# Patient Record
Sex: FEMALE | Race: OTHER | Employment: OTHER | URBAN - METROPOLITAN AREA
[De-identification: names, ages, dates, MRNs, and addresses within clinical notes are randomized per-mention and may not be internally consistent; named-entity substitution may affect disease eponyms.]

---

## 2017-02-14 ENCOUNTER — OFFICE VISIT (OUTPATIENT)
Dept: ENDOCRINOLOGY | Age: 72
End: 2017-02-14

## 2017-02-14 VITALS
OXYGEN SATURATION: 93 % | SYSTOLIC BLOOD PRESSURE: 138 MMHG | DIASTOLIC BLOOD PRESSURE: 86 MMHG | WEIGHT: 159.6 LBS | HEART RATE: 99 BPM

## 2017-02-14 DIAGNOSIS — E03.8 HYPOTHYROIDISM DUE TO HASHIMOTO'S THYROIDITIS: ICD-10-CM

## 2017-02-14 DIAGNOSIS — E06.3 HYPOTHYROIDISM DUE TO HASHIMOTO'S THYROIDITIS: ICD-10-CM

## 2017-02-14 DIAGNOSIS — E78.2 MIXED HYPERLIPIDEMIA: Primary | ICD-10-CM

## 2017-02-14 DIAGNOSIS — R73.03 PREDIABETES: ICD-10-CM

## 2017-02-14 DIAGNOSIS — F41.9 ANXIETY: ICD-10-CM

## 2017-02-14 DIAGNOSIS — L71.9 ACNE ROSACEA: ICD-10-CM

## 2017-02-14 PROCEDURE — 99204 OFFICE O/P NEW MOD 45 MIN: CPT | Performed by: INTERNAL MEDICINE

## 2017-02-14 RX ORDER — LEVOTHYROXINE SODIUM 0.03 MG/1
25 TABLET ORAL DAILY
Qty: 30 TABLET | Refills: 3 | Status: SHIPPED | OUTPATIENT
Start: 2017-02-14 | End: 2017-02-16 | Stop reason: SDUPTHER

## 2017-02-14 ASSESSMENT — PATIENT HEALTH QUESTIONNAIRE - PHQ9
SUM OF ALL RESPONSES TO PHQ9 QUESTIONS 1 & 2: 0
SUM OF ALL RESPONSES TO PHQ QUESTIONS 1-9: 0
1. LITTLE INTEREST OR PLEASURE IN DOING THINGS: 0
2. FEELING DOWN, DEPRESSED OR HOPELESS: 0

## 2017-02-16 ENCOUNTER — TELEPHONE (OUTPATIENT)
Dept: ENDOCRINOLOGY | Age: 72
End: 2017-02-16

## 2017-02-16 RX ORDER — LEVOTHYROXINE SODIUM 0.03 MG/1
25 TABLET ORAL DAILY
Qty: 30 TABLET | Refills: 3 | Status: SHIPPED | OUTPATIENT
Start: 2017-02-16 | End: 2017-10-05

## 2017-03-21 ENCOUNTER — TELEPHONE (OUTPATIENT)
Dept: ENDOCRINOLOGY | Age: 72
End: 2017-03-21

## 2017-08-21 ENCOUNTER — TELEPHONE (OUTPATIENT)
Dept: FAMILY MEDICINE CLINIC | Age: 72
End: 2017-08-21

## 2017-10-05 ENCOUNTER — OFFICE VISIT (OUTPATIENT)
Dept: FAMILY MEDICINE CLINIC | Age: 72
End: 2017-10-05

## 2017-10-05 DIAGNOSIS — Z13.220 SCREENING, LIPID: ICD-10-CM

## 2017-10-05 DIAGNOSIS — E03.9 HYPOTHYROIDISM, UNSPECIFIED TYPE: Primary | ICD-10-CM

## 2017-10-05 DIAGNOSIS — R73.03 PREDIABETES: ICD-10-CM

## 2017-10-05 DIAGNOSIS — Z76.89 ENCOUNTER TO ESTABLISH CARE: ICD-10-CM

## 2017-10-05 PROCEDURE — 99203 OFFICE O/P NEW LOW 30 MIN: CPT | Performed by: FAMILY MEDICINE

## 2017-10-05 ASSESSMENT — ENCOUNTER SYMPTOMS
VOMITING: 0
HEARTBURN: 0
ABDOMINAL PAIN: 0
COUGH: 0
WHEEZING: 0
DIARRHEA: 0
SORE THROAT: 0
BACK PAIN: 1
ORTHOPNEA: 0
NAUSEA: 0
SHORTNESS OF BREATH: 0
CONSTIPATION: 0

## 2017-10-05 NOTE — MR AVS SNAPSHOT
After Visit Summary             Dalton Santana   10/5/2017 10:00 AM   Office Visit    Description:  Female : 1945   Provider:  Elver Noonan MD   Department:  [facility] and Future Appointments         Below is a list of your follow-up and future appointments. This may not be a complete list as you may have made appointments directly with providers that we are not aware of or your providers may have made some for you. Please call your providers to confirm appointments. It is important to keep your appointments. Please bring your current insurance card, photo ID, co-pay, and all medication bottles to your appointment. If self-pay, payment is expected at the time of service. Your To-Do List     Future Orders Complete By Expires    CBC Auto Differential [NUG6463 Custom]  10/5/2017 10/5/2018    Comprehensive Metabolic Panel [NYH02 Custom]  10/5/2017 10/5/2018    Hemoglobin A1C [LAB90 Custom]  10/5/2017 10/5/2018    Lipid Panel [LAB18 Custom]  10/5/2017 10/5/2018    T4, Free [YCQ644 Custom]  10/5/2017 10/5/2018    TSH without Reflex [TWF730 Custom]  10/5/2017 10/5/2018    Urinalysis with Microscopic [VUG770 Custom]  10/5/2017 10/6/2018    Follow-Up    Return in about 2 weeks (around 10/19/2017) for to review blood work results .          Information from Your Visit        Department     Name Address Phone Fax    [address] [facility] [address] [phone]      You Were Seen for:         Comments    Encounter to establish care   [[id number]]         Vital Signs     Blood Pressure Pulse Weight Oxygen Saturation Smoking Status       150/80 (Site: Left Arm, Position: Sitting, Cuff Size: Medium Adult) 76 126 lb 9.6 oz (57.4 kg) 98% Never Smoker          Today's Medication Changes          These changes are accurate as of: 10/5/17 10:50 AM.  If you have any questions, ask your nurse or doctor. STOP taking these medications           levothyroxine 25 MCG tablet   Commonly known as:  LEVOTHROID   Stopped by:  Felisa Feng MD               Allergies           No Known Allergies         Additional Information        Basic Information     Date Of Birth Sex Race Ethnicity Preferred Language    1945 Female Other Non-/Non  English      Problem List as of 10/5/2017                 Hypothyroidism    Prediabetes    Anxiety      Preventive Care        Date Due    Tetanus Combination Vaccine (1 - Tdap) 9/30/1962    Cholesterol Screening 9/30/1983    Mammograms are recommended every 2 years for low/average risk patients aged 48 - 69, and every year for high risk patients per updated national guidelines. However these guidelines can be individualized by your provider. 9/30/1993    Colonoscopy 9/30/1993    Zoster Vaccine 9/30/2003    Osteoporosis screening or a bone density scan (Dexa) is recommended once at age 72. Based upon the results and risk factors for bone loss, your provider will recommend whether this needs to be repeated. 9/30/2008    Pneumococcal Vaccines (two) for all adults aged 72 and over (1 of 2 - PCV13) 9/30/2008    Yearly Flu Vaccine (1) 9/1/2017            MyChart Signup           [facility] allows you to send messages to your doctor, view your test results, renew your prescriptions, schedule appointments, view visit notes, and more. How Do I Sign Up? 1. In your Internet browser, go to [email or web address]  2. Click on the Sign Up Now link in the Sign In box. You will see the New Member Sign Up page. 3. Enter your [facility] Access Code exactly as it appears below. You will not need to use this code after youve completed the sign-up process. If you do not sign up before the expiration date, you must request a new code.   [facility] Access Code: [email or web address]  Expires: 12/4/2017 10:47 AM 4. Enter your Social Security Number ([id number]) and Date of Birth (mm/dd/yyyy) as indicated and click Submit. You will be taken to the next sign-up page. 5. Create a [employer] ID. This will be your [employer] login ID and cannot be changed, so think of one that is secure and easy to remember. 6. Create a [employer] password. You can change your password at any time. 7. Enter your Password Reset Question and Answer. This can be used at a later time if you forget your password. 8. Enter your e-mail address. You will receive e-mail notification when new information is available in [address] 9. Click Sign Up. You can now view your medical record. Additional Information  If you have questions, please contact the physician practice where you receive care. Remember, [employer] is NOT to be used for urgent needs. For medical emergencies, dial 911. For questions regarding your [employer] account call [phone]. If you have a clinical question, please call your doctor's office.

## 2017-10-13 ENCOUNTER — TELEPHONE (OUTPATIENT)
Dept: FAMILY MEDICINE CLINIC | Age: 72
End: 2017-10-13

## 2017-10-18 VITALS
DIASTOLIC BLOOD PRESSURE: 80 MMHG | SYSTOLIC BLOOD PRESSURE: 150 MMHG | HEART RATE: 76 BPM | WEIGHT: 148 LBS | OXYGEN SATURATION: 98 %

## 2017-10-18 DIAGNOSIS — R73.03 PREDIABETES: ICD-10-CM

## 2017-10-18 DIAGNOSIS — E03.9 HYPOTHYROIDISM, UNSPECIFIED TYPE: ICD-10-CM

## 2017-10-18 LAB
A/G RATIO: 1.4 (ref 1.1–2.2)
ALBUMIN SERPL-MCNC: 4.6 G/DL (ref 3.4–5)
ALP BLD-CCNC: 92 U/L (ref 40–129)
ALT SERPL-CCNC: 28 U/L (ref 10–40)
ANION GAP SERPL CALCULATED.3IONS-SCNC: 17 MMOL/L (ref 3–16)
AST SERPL-CCNC: 26 U/L (ref 15–37)
ATYPICAL LYMPHOCYTE RELATIVE PERCENT: 9 % (ref 0–6)
BASOPHILS ABSOLUTE: 0.2 K/UL (ref 0–0.2)
BASOPHILS RELATIVE PERCENT: 2 %
BILIRUB SERPL-MCNC: 0.8 MG/DL (ref 0–1)
BILIRUBIN URINE: NEGATIVE
BLOOD, URINE: NEGATIVE
BUN BLDV-MCNC: 10 MG/DL (ref 7–20)
CALCIUM SERPL-MCNC: 8.9 MG/DL (ref 8.3–10.6)
CHLORIDE BLD-SCNC: 106 MMOL/L (ref 99–110)
CHOLESTEROL, TOTAL: 187 MG/DL (ref 0–199)
CLARITY: CLEAR
CO2: 22 MMOL/L (ref 21–32)
COLOR: YELLOW
CREAT SERPL-MCNC: <0.5 MG/DL (ref 0.6–1.2)
EOSINOPHILS ABSOLUTE: 0.2 K/UL (ref 0–0.6)
EOSINOPHILS RELATIVE PERCENT: 2 %
EPITHELIAL CELLS, UA: 2 /HPF (ref 0–5)
ESTIMATED AVERAGE GLUCOSE: 122.6 MG/DL
GFR AFRICAN AMERICAN: >60
GFR NON-AFRICAN AMERICAN: >60
GLOBULIN: 3.2 G/DL
GLUCOSE BLD-MCNC: 133 MG/DL (ref 70–99)
GLUCOSE URINE: NEGATIVE MG/DL
HBA1C MFR BLD: 5.9 %
HCT VFR BLD CALC: 46.1 % (ref 36–48)
HDLC SERPL-MCNC: 41 MG/DL (ref 40–60)
HEMOGLOBIN: 15.5 G/DL (ref 12–16)
HYALINE CASTS: 0 /LPF (ref 0–8)
KETONES, URINE: NEGATIVE MG/DL
LDL CHOLESTEROL CALCULATED: 111 MG/DL
LEUKOCYTE ESTERASE, URINE: NEGATIVE
LYMPHOCYTES ABSOLUTE: 4 K/UL (ref 1–5.1)
LYMPHOCYTES RELATIVE PERCENT: 44 %
MCH RBC QN AUTO: 32.6 PG (ref 26–34)
MCHC RBC AUTO-ENTMCNC: 33.7 G/DL (ref 31–36)
MCV RBC AUTO: 96.7 FL (ref 80–100)
MICROSCOPIC EXAMINATION: YES
MONOCYTES ABSOLUTE: 0.2 K/UL (ref 0–1.3)
MONOCYTES RELATIVE PERCENT: 2 %
NEUTROPHILS ABSOLUTE: 3.1 K/UL (ref 1.7–7.7)
NEUTROPHILS RELATIVE PERCENT: 41 %
NITRITE, URINE: NEGATIVE
PDW BLD-RTO: 13.9 % (ref 12.4–15.4)
PH UA: 6
PLATELET # BLD: 190 K/UL (ref 135–450)
PMV BLD AUTO: 9.8 FL (ref 5–10.5)
POTASSIUM SERPL-SCNC: 3.8 MMOL/L (ref 3.5–5.1)
PROTEIN UA: 30 MG/DL
RBC # BLD: 4.77 M/UL (ref 4–5.2)
RBC # BLD: NORMAL 10*6/UL
RBC UA: 1 /HPF (ref 0–4)
SODIUM BLD-SCNC: 145 MMOL/L (ref 136–145)
SPECIFIC GRAVITY UA: 1.01
T4 FREE: 0.9 NG/DL (ref 0.9–1.8)
TOTAL PROTEIN: 7.8 G/DL (ref 6.4–8.2)
TRIGL SERPL-MCNC: 177 MG/DL (ref 0–150)
TSH SERPL DL<=0.05 MIU/L-ACNC: 9.06 UIU/ML (ref 0.27–4.2)
UROBILINOGEN, URINE: 0.2 E.U./DL
VLDLC SERPL CALC-MCNC: 35 MG/DL
WBC # BLD: 7.5 K/UL (ref 4–11)
WBC UA: 2 /HPF (ref 0–5)

## 2017-10-18 PROCEDURE — 81001 URINALYSIS AUTO W/SCOPE: CPT | Performed by: FAMILY MEDICINE

## 2017-10-24 ENCOUNTER — TELEPHONE (OUTPATIENT)
Dept: FAMILY MEDICINE CLINIC | Age: 72
End: 2017-10-24

## 2017-11-13 ENCOUNTER — OFFICE VISIT (OUTPATIENT)
Dept: FAMILY MEDICINE CLINIC | Age: 72
End: 2017-11-13

## 2017-11-13 VITALS — WEIGHT: 155 LBS | DIASTOLIC BLOOD PRESSURE: 80 MMHG | SYSTOLIC BLOOD PRESSURE: 150 MMHG

## 2017-11-13 DIAGNOSIS — I10 ESSENTIAL HYPERTENSION: ICD-10-CM

## 2017-11-13 DIAGNOSIS — E03.9 ACQUIRED HYPOTHYROIDISM: Primary | ICD-10-CM

## 2017-11-13 DIAGNOSIS — Z78.0 POSTMENOPAUSAL: ICD-10-CM

## 2017-11-13 DIAGNOSIS — R73.03 PREDIABETES: ICD-10-CM

## 2017-11-13 PROCEDURE — 99213 OFFICE O/P EST LOW 20 MIN: CPT | Performed by: FAMILY MEDICINE

## 2017-11-13 ASSESSMENT — ENCOUNTER SYMPTOMS: RESPIRATORY NEGATIVE: 1

## 2017-11-13 NOTE — PROGRESS NOTES
History   Smoking Status    Never Smoker   Smokeless Tobacco    Never Used     PMH, surgeries, SH, FH, allergies reviewed. Medication list reviewed and updated. No LMP recorded. Patient has had a hysterectomy. Chief Complaint   Patient presents with    Results     here to discuss lab results      Review of Systems   Constitutional: Negative. Respiratory: Negative. Cardiovascular: Negative. Musculoskeletal: Negative for falls. Objective:   VS reviewed    Vitals:    11/13/17 1356 11/13/17 1412   BP: (!) 150/82 (!) 150/80   Site: Right Arm    Position: Sitting    Cuff Size: Medium Adult    Weight: 155 lb (70.3 kg)      There is no height or weight on file to calculate BMI. Wt Readings from Last 3 Encounters:   11/13/17 155 lb (70.3 kg)   10/05/17 148 lb (67.1 kg)   02/14/17 159 lb 9.6 oz (72.4 kg)     BP Readings from Last 3 Encounters:   11/13/17 (!) 150/80   10/05/17 (!) 150/80   02/14/17 138/86       Physical Exam   Constitutional: She is oriented to person, place, and time. No distress. HENT:   Mouth/Throat: Oropharynx is clear and moist. No oropharyngeal exudate. Eyes: Pupils are equal, round, and reactive to light. Neck: Neck supple. No JVD present. No thyromegaly present. Cardiovascular: Normal rate, regular rhythm, normal heart sounds and intact distal pulses. No murmur heard. Pulmonary/Chest: Effort normal and breath sounds normal. No respiratory distress. Musculoskeletal: She exhibits no edema. Neurological: She is alert and oriented to person, place, and time.    Psychiatric: Mood, memory, affect and judgment normal.       Lab Results   Component Value Date     10/18/2017    K 3.8 10/18/2017     10/18/2017    CO2 22 10/18/2017    BUN 10 10/18/2017    CREATININE <0.5 (L) 10/18/2017    GLUCOSE 133 (H) 10/18/2017    CALCIUM 8.9 10/18/2017    PROT 7.8 10/18/2017    LABALBU 4.6 10/18/2017    BILITOT 0.8 10/18/2017    ALKPHOS 92 10/18/2017    AST 26 10/18/2017    ALT 28 10/18/2017    LABGLOM >60 10/18/2017    GFRAA >60 10/18/2017    AGRATIO 1.4 10/18/2017    GLOB 3.2 10/18/2017       Lab Results   Component Value Date    WBC 7.5 10/18/2017    HGB 15.5 10/18/2017    HCT 46.1 10/18/2017    MCV 96.7 10/18/2017     10/18/2017       Lab Results   Component Value Date    CHOL 187 10/18/2017     Lab Results   Component Value Date    TRIG 177 (H) 10/18/2017     Lab Results   Component Value Date    HDL 41 10/18/2017     Lab Results   Component Value Date    LDLCALC 111 (H) 10/18/2017     Lab Results   Component Value Date    LABVLDL 35 10/18/2017     No results found for: [facility]    Lab Results   Component Value Date    LABMICR YES 10/18/2017       Lab Results   Component Value Date    LABA1C 5.9 10/18/2017     Lab Results   Component Value Date    .6 10/18/2017     Lab Results   Component Value Date    TSH 9.06 (H) 10/18/2017   Free T4: 0.9     Assessment/Plan:    1. Acquired hypothyroidism  2. Essential hypertension  3. Prediabetes   Patient reports she had severe pain for months while she was taking levothyroxine some time ago. Discussed with patient this is not a common or rare side effect and might have been 2ry to something else. Declined any medication treatment. Declined any anti- hypertensive medication, BP is acceptable for a mobile senior but not ideal, this was discussed with patient. Low carb and low sodium diet discussed. Return in about 4 months (around 3/13/2018) for HTN . Amanda received counseling on the following healthy behaviors: nutrition and exercise    Patient given educational materials on Nutrition, low sodium diet, prediabetes and hypothyroidism. .     I have instructed Sarah Barajas to complete a self tracking handout on Blood Pressures  and instructed them to bring it with them to her next appointment. Discussed use, benefit, and side effects of prescribed medications. Barriers to medication compliance addressed.   All patient questions answered. Pt voiced understanding.

## 2017-11-13 NOTE — PATIENT INSTRUCTIONS
click on the \"Sign Up Now\" link. Current as of: July 28, 2016  Content Version: 11.3  © 2006-2017 [employer]. Care instructions adapted under license by [facility] ([facility]). If you have questions about a medical condition or this instruction, always ask your healthcare professional. [address] any warranty or liability for your use of this information. Patient Education        Prediabetes: Care Instructions  Your Care Instructions  Prediabetes is a warning sign that you are at risk for getting type 2 diabetes. It means that your blood sugar is higher than it should be. The food you eat turns into sugar, which your body uses for energy. Normally, an organ called the pancreas makes insulin, which allows the sugar in your blood to get into your body's cells. But when your body can't use insulin the right way, the sugar doesn't move into cells. It stays in your blood instead. This is called insulin resistance. The buildup of sugar in the blood causes prediabetes. The good news is that lifestyle changes may help you get your blood sugar back to normal and help you avoid or delay diabetes. Follow-up care is a key part of your treatment and safety. Be sure to make and go to all appointments, and call your doctor if you are having problems. It's also a good idea to know your test results and keep a list of the medicines you take. How can you care for yourself at home? · Watch your weight. A healthy weight helps your body use insulin properly. · Limit the amount of calories, sweets, and unhealthy fat you eat. Ask your doctor if you should see a dietitian. A registered dietitian can help you create meal plans that fit your lifestyle. · Get at least 30 minutes of exercise on most days of the week. Exercise helps control your blood sugar. It also helps you maintain a healthy weight. Walking is a good choice.  You also may want to do other activities, such as running, swimming, cycling,

## 2017-11-15 NOTE — PROGRESS NOTES
Arnulfo Morrison
I called [facility] on [address] and spoke with [place]. She stats that Mrs. Marr only got 2 flu shots. 10/12/2017 and 10/10/2017. Will add these to her immunization record. DC
She exhibits no distension. There is no tenderness. Musculoskeletal: She exhibits no edema or tenderness. Lymphadenopathy:     She has no cervical adenopathy. Neurological: She is alert and oriented to person, place, and time. Skin: No rash noted. No erythema. No pallor. Psychiatric: Mood, memory, affect and judgment normal.   Tangentiality. Get up and go test:     Well coordinated movements:No  Adjusted movements: Yes  Supervision needed: yes  Physical support needed: yes      ASSESSMENT / PLAN    Encounter to establish care   Hypothyroidism, unspecified type   Prediabetes  - CBC Auto Differential; Future  - Comprehensive Metabolic Panel; Future  - T4, Free; Future  - Lipid Panel; Future  - Hemoglobin A1C; Future  - Urinalysis with Microscopic; Future    4. Screening, lipid    Return in about 2 weeks (around 10/19/2017) for to review blood work results . Before if needed.       Orders Placed This Encounter   Procedures    CBC Auto Differential    Comprehensive Metabolic Panel    TSH without Reflex    T4, Free    Lipid Panel    Hemoglobin A1C    Urinalysis with Microscopic

## 2017-12-13 ENCOUNTER — HOSPITAL ENCOUNTER (OUTPATIENT)
Dept: GENERAL RADIOLOGY | Age: 72
Discharge: HOME | End: 2017-12-13
Attending: FAMILY MEDICINE | Admitting: FAMILY MEDICINE

## 2017-12-13 DIAGNOSIS — Z78.0 ASYMPTOMATIC MENOPAUSAL STATE: ICD-10-CM

## 2017-12-13 DIAGNOSIS — Z78.0 POSTMENOPAUSAL: ICD-10-CM

## 2018-04-17 ENCOUNTER — TELEPHONE (OUTPATIENT)
Dept: FAMILY MEDICINE CLINIC | Age: 73
End: 2018-04-17

## 2018-04-20 ENCOUNTER — OFFICE VISIT (OUTPATIENT)
Dept: FAMILY MEDICINE CLINIC | Age: 73
End: 2018-04-20

## 2018-04-20 VITALS
DIASTOLIC BLOOD PRESSURE: 90 MMHG | OXYGEN SATURATION: 98 % | HEART RATE: 62 BPM | SYSTOLIC BLOOD PRESSURE: 140 MMHG | WEIGHT: 157.2 LBS

## 2018-04-20 DIAGNOSIS — E03.9 ACQUIRED HYPOTHYROIDISM: ICD-10-CM

## 2018-04-20 DIAGNOSIS — R26.81 UNSTEADY GAIT: Primary | ICD-10-CM

## 2018-04-20 DIAGNOSIS — I10 ESSENTIAL HYPERTENSION: ICD-10-CM

## 2018-04-20 DIAGNOSIS — R29.6 FREQUENT FALLS: ICD-10-CM

## 2018-04-20 PROCEDURE — 99213 OFFICE O/P EST LOW 20 MIN: CPT | Performed by: FAMILY MEDICINE

## 2018-04-20 ASSESSMENT — PATIENT HEALTH QUESTIONNAIRE - PHQ9
SUM OF ALL RESPONSES TO PHQ9 QUESTIONS 1 & 2: 0
1. LITTLE INTEREST OR PLEASURE IN DOING THINGS: 0
SUM OF ALL RESPONSES TO PHQ QUESTIONS 1-9: 0
2. FEELING DOWN, DEPRESSED OR HOPELESS: 0

## 2018-04-20 ASSESSMENT — ENCOUNTER SYMPTOMS
RESPIRATORY NEGATIVE: 1
GASTROINTESTINAL NEGATIVE: 1
DOUBLE VISION: 0
BLURRED VISION: 0

## 2018-04-24 ENCOUNTER — TELEPHONE (OUTPATIENT)
Dept: FAMILY MEDICINE CLINIC | Age: 73
End: 2018-04-24

## 2018-06-19 ENCOUNTER — TELEPHONE (OUTPATIENT)
Dept: FAMILY MEDICINE CLINIC | Age: 73
End: 2018-06-19

## 2018-07-09 ENCOUNTER — OFFICE VISIT (OUTPATIENT)
Dept: FAMILY MEDICINE CLINIC | Age: 73
End: 2018-07-09

## 2018-07-09 VITALS
WEIGHT: 155.4 LBS | OXYGEN SATURATION: 97 % | HEART RATE: 89 BPM | DIASTOLIC BLOOD PRESSURE: 80 MMHG | SYSTOLIC BLOOD PRESSURE: 130 MMHG

## 2018-07-09 DIAGNOSIS — I10 ESSENTIAL HYPERTENSION: Primary | ICD-10-CM

## 2018-07-09 DIAGNOSIS — R26.81 UNSTEADY GAIT: ICD-10-CM

## 2018-07-09 PROCEDURE — 99213 OFFICE O/P EST LOW 20 MIN: CPT | Performed by: FAMILY MEDICINE

## 2018-07-09 NOTE — PROGRESS NOTES
History   Smoking Status    Never Smoker   Smokeless Tobacco    Never Used     PMH, surgeries, SH, FH, allergies reviewed. Medication list reviewed and updated. No LMP recorded. Patient has had a hysterectomy. Chief Complaint   Patient presents with    Hypertension     House call nurse visited patient told her BP was elevated 160's/? Denies chest pain, SOB, cough, visual changes, dizziness, palpitations or headache. Review of Systems   Musculoskeletal: Negative for falls. Psychiatric/Behavioral: Negative for depression. Objective:   VS reviewed    Vitals:    07/09/18 1327   BP: 130/80   Site: Left Arm   Position: Sitting   Cuff Size: Medium Adult   Pulse: 89   SpO2: 97%   Weight: 155 lb 6.4 oz (70.5 kg)     There is no height or weight on file to calculate BMI. Wt Readings from Last 3 Encounters:   07/09/18 155 lb 6.4 oz (70.5 kg)   04/20/18 157 lb 3.2 oz (71.3 kg)   11/13/17 155 lb (70.3 kg)     BP Readings from Last 3 Encounters:   07/09/18 130/80   04/20/18 (!) 140/90   11/13/17 (!) 150/80       Physical Exam   Constitutional: She is oriented to person, place, and time. No distress. Neck: No JVD present. No thyromegaly present. Cardiovascular: Normal rate, regular rhythm, normal heart sounds and intact distal pulses. No murmur heard. Pulmonary/Chest: Effort normal and breath sounds normal. No respiratory distress. Musculoskeletal: She exhibits no edema. Neurological: She is alert and oriented to person, place, and time.    Psychiatric: Mood, memory, affect and judgment normal.     Get up and go test:     Well coordinated movements:No  Adjusted movements: Yes  Supervision needed: no  Physical support needed: yes  Lab Results   Component Value Date     10/18/2017    K 3.8 10/18/2017     10/18/2017    CO2 22 10/18/2017    BUN 10 10/18/2017    CREATININE <0.5 (L) 10/18/2017    GLUCOSE 133 (H) 10/18/2017    CALCIUM 8.9 10/18/2017    PROT 7.8 10/18/2017    LABALBU 4.6

## 2019-05-08 ENCOUNTER — OFFICE VISIT (OUTPATIENT)
Dept: FAMILY MEDICINE CLINIC | Age: 74
End: 2019-05-08
Payer: MEDICARE

## 2019-05-08 VITALS
HEIGHT: 66 IN | DIASTOLIC BLOOD PRESSURE: 80 MMHG | HEART RATE: 89 BPM | OXYGEN SATURATION: 99 % | SYSTOLIC BLOOD PRESSURE: 130 MMHG | BODY MASS INDEX: 24.23 KG/M2 | WEIGHT: 150.8 LBS

## 2019-05-08 DIAGNOSIS — R73.03 PREDIABETES: ICD-10-CM

## 2019-05-08 DIAGNOSIS — Z00.00 ROUTINE GENERAL MEDICAL EXAMINATION AT A HEALTH CARE FACILITY: Primary | ICD-10-CM

## 2019-05-08 DIAGNOSIS — F41.9 ANXIETY: ICD-10-CM

## 2019-05-08 DIAGNOSIS — E03.9 ACQUIRED HYPOTHYROIDISM: ICD-10-CM

## 2019-05-08 PROCEDURE — 90732 PPSV23 VACC 2 YRS+ SUBQ/IM: CPT | Performed by: FAMILY MEDICINE

## 2019-05-08 PROCEDURE — G0438 PPPS, INITIAL VISIT: HCPCS | Performed by: FAMILY MEDICINE

## 2019-05-08 PROCEDURE — 90471 IMMUNIZATION ADMIN: CPT | Performed by: FAMILY MEDICINE

## 2019-05-08 PROCEDURE — 90715 TDAP VACCINE 7 YRS/> IM: CPT | Performed by: FAMILY MEDICINE

## 2019-05-08 PROCEDURE — G0009 ADMIN PNEUMOCOCCAL VACCINE: HCPCS | Performed by: FAMILY MEDICINE

## 2019-05-08 ASSESSMENT — PATIENT HEALTH QUESTIONNAIRE - PHQ9
SUM OF ALL RESPONSES TO PHQ QUESTIONS 1-9: 0
SUM OF ALL RESPONSES TO PHQ QUESTIONS 1-9: 0

## 2019-05-08 ASSESSMENT — ANXIETY QUESTIONNAIRES: GAD7 TOTAL SCORE: 0

## 2019-05-08 ASSESSMENT — LIFESTYLE VARIABLES: HOW OFTEN DO YOU HAVE A DRINK CONTAINING ALCOHOL: 0

## 2019-05-08 NOTE — PATIENT INSTRUCTIONS
Personalized Preventive Plan for Betito Hoover - 5/8/2019  Medicare offers a range of preventive health benefits. Some of the tests and screenings are paid in full while other may be subject to a deductible, co-insurance, and/or copay. Some of these benefits include a comprehensive review of your medical history including lifestyle, illnesses that may run in your family, and various assessments and screenings as appropriate. After reviewing your medical record and screening and assessments performed today your provider may have ordered immunizations, labs, imaging, and/or referrals for you. A list of these orders (if applicable) as well as your Preventive Care list are included within your After Visit Summary for your review. Other Preventive Recommendations:    · A preventive eye exam performed by an eye specialist is recommended every 1-2 years to screen for glaucoma; cataracts, macular degeneration, and other eye disorders. · A preventive dental visit is recommended every 6 months. · Try to get at least 150 minutes of exercise per week or 10,000 steps per day on a pedometer . · Order or download the FREE \"Exercise & Physical Activity: Your Everyday Guide\" from [employer]. Call 0-847.291.7396 or search [employer] online. · You need 1601-4420 mg of calcium and 9340-8829 IU of vitamin D per day. It is possible to meet your calcium requirement with diet alone, but a vitamin D supplement is usually necessary to meet this goal.  · When exposed to the sun, use a sunscreen that protects against both UVA and UVB radiation with an SPF of 30 or greater. Reapply every 2 to 3 hours or after sweating, drying off with a towel, or swimming. · Always wear a seat belt when traveling in a car. Always wear a helmet when riding a bicycle or motorcycle.

## 2019-05-08 NOTE — PROGRESS NOTES
Medicare Annual Wellness Visit  Name: Maxim Jama Date: 2019   MRN: [id number] Sex: Female   Age: 74 y.o. Ethnicity: Non-/Non    : 1945 Race: Other      Elizabet Pruett is here for Medicare AWV    Screenings for behavioral, psychosocial and functional/safety risks, and cognitive dysfunction are all negative except as indicated below. These results, as well as other patient data from the [address] form, are documented in Flowsheets linked to this Encounter. No Known Allergiesic  Prior to Visit Medications    Not on File   ations    Not on File      Diagnosis Date    Anxiety     Hypothyroidism     Irritable bowel syndrome     Prediabetes      Past Surgical History:   Procedure Laterality Date    HYSTERECTOMY         Family History   Problem Relation Age of Onset    Thyroid Disease Sister     No Known Problems Sister     No Known Problems Son     No Known Problems Son     No Known Problems Daughter        CareTeam (Including outside providers/suppliers regularly involved in providing care):   Patient Care Team:  Ale Chandra MD as PCP - General (Family Medicine)  Ale Chandra MD as PCP - S Attributed Provider    Wt Readings from Last 3 Encounters:   19 150 lb 12.8 oz (68.4 kg)   18 155 lb 6.4 oz (70.5 kg)   18 157 lb 3.2 oz (71.3 kg)     Vitals:    19 1358   BP: 130/80   Site: Left Upper Arm   Position: Sitting   Cuff Size: Medium Adult   Pulse: 89   SpO2: 99%   Weight: 150 lb 12.8 oz (68.4 kg)   Height: 5' 6\" (1.676 m)     Body mass index is 24.34 kg/m². Based upon direct observation of the patient, evaluation of cognition reveals recent and remote memory intact. Physical Exam   Constitutional: She is oriented to person, place, and time. No distress. HENT:   Mouth/Throat: No oropharyngeal exudate. Eyes: Pupils are equal, round, and reactive to light.    Cardiovascular: Normal rate, regular rhythm, normal heart sounds and intact distal pulses. No murmur heard. Pulmonary/Chest: Effort normal and breath sounds normal. No respiratory distress. Musculoskeletal: She exhibits no edema. Neurological: She is alert and oriented to person, place, and time. Skin: Capillary refill takes less than 2 seconds. No rash noted. No erythema. No pallor. Psychiatric: She has a normal mood and affect. Her behavior is normal. Judgment and thought content normal.     Patient's complete Health Risk Assessment and screening values have been reviewed and are found in Flowsheets. The following problems were reviewed today and where indicated follow up appointments were made and/or referrals ordered. Positive Risk Factor Screenings with Interventions:     Fall Risk:  Timed Up and Go Test > 12 seconds?: (!) yes  2 or more falls in past year?: (!) yes  Fall with injury in past year?: (!) yes  Fall Risk Assessment[de-identified] (!) Positive Screening for Falls  Fall Risk Interventions:    · Patient declines any further evaluation/treatment for this issue    General Health:  General  In general, how would you say your health is?: Good  Do you get the social and emotional support that you need?: Yes  Do you have a Living Will?: (!) No  General Health Risk Interventions:  · No Living Will: provided the state-specific advance directive document to the patient    Health Habits/Nutrition:  Health Habits/Nutrition  Do you exercise for at least 20 minutes 2-3 times per week?: Yes  Have you lost any weight without trying in the past 3 months?: (!) Yes  Do you eat fewer than 2 meals per day?: (!) Yes  Have you seen a dentist within the past year?: Yes  Body mass index is 24.34 kg/m².   Health Habits/Nutrition Interventions:  · Inadequate physical activity:  patient is not ready to increase his/her physical activity level at this time  · Nutritional issues:  Patient declined further referral  · Dental exam overdue:  patient encouraged to make appointment with his/her dentist    Hearing/Vision:  Hearing/Vision  Do you or your family notice any trouble with your hearing?: No  Do you have difficulty driving, watching TV, or doing any of your daily activities because of your eyesight?: (!) Yes  Have you had an eye exam within the past year?: (!) No  Hearing/Vision Interventions:  · Vision concerns:  patient encouraged to make appointment with his/her eye specialist    Personalized Preventive Plan   Current Health Maintenance Status  Immunization History   Administered Date(s) Administered    Influenza, High Dose (Fluzone 65 yrs and older) 09/23/2009, 10/10/2016, 10/12/2017    Pneumococcal 13-valent Conjugate (Kellie Angeles) 09/30/2009        Health Maintenance   Topic Date Due    DTaP/Tdap/Td vaccine (1 - Tdap) 09/30/1962    Shingles Vaccine (1 of 2) 09/30/1993    Pneumococcal 65+ years Vaccine (2 of 2 - PPSV23) 09/30/2010    A1C test (Diabetic or Prediabetic)  10/18/2018    TSH testing  10/18/2018    Potassium monitoring  10/18/2018    Creatinine monitoring  10/18/2018    Flu vaccine (Season Ended) 09/01/2019    Colon cancer screen colonoscopy  10/16/2020    Lipid screen  10/18/2022    DEXA (modify frequency per FRAX score)  Completed     Recommendations for Preventive Services Due: see orders and patient instructions/AVS.  .   Recommended screening schedule for the next 5-10 years is provided to the patient in written form: see Patient Instructions/AVS.

## 2020-01-31 ENCOUNTER — OFFICE VISIT (OUTPATIENT)
Dept: PRIMARY CARE CLINIC | Age: 75
End: 2020-01-31
Payer: MEDICARE

## 2020-01-31 VITALS
SYSTOLIC BLOOD PRESSURE: 140 MMHG | TEMPERATURE: 98 F | OXYGEN SATURATION: 96 % | DIASTOLIC BLOOD PRESSURE: 88 MMHG | WEIGHT: 155 LBS | RESPIRATION RATE: 15 BRPM | BODY MASS INDEX: 25.02 KG/M2 | HEART RATE: 82 BPM

## 2020-01-31 DIAGNOSIS — R53.83 OTHER FATIGUE: ICD-10-CM

## 2020-01-31 DIAGNOSIS — R73.09 ELEVATED GLUCOSE: ICD-10-CM

## 2020-01-31 DIAGNOSIS — E03.9 ACQUIRED HYPOTHYROIDISM: ICD-10-CM

## 2020-01-31 DIAGNOSIS — I10 ESSENTIAL HYPERTENSION: ICD-10-CM

## 2020-01-31 LAB
A/G RATIO: 1.4 (ref 1.1–2.2)
ALBUMIN SERPL-MCNC: 4.6 G/DL (ref 3.4–5)
ALP BLD-CCNC: 110 U/L (ref 40–129)
ALT SERPL-CCNC: 33 U/L (ref 10–40)
ANION GAP SERPL CALCULATED.3IONS-SCNC: 17 MMOL/L (ref 3–16)
AST SERPL-CCNC: 38 U/L (ref 15–37)
BASOPHILS ABSOLUTE: 0 K/UL (ref 0–0.2)
BASOPHILS RELATIVE PERCENT: 0.5 %
BILIRUB SERPL-MCNC: 0.5 MG/DL (ref 0–1)
BUN BLDV-MCNC: 6 MG/DL (ref 7–20)
CALCIUM SERPL-MCNC: 9 MG/DL (ref 8.3–10.6)
CHLORIDE BLD-SCNC: 105 MMOL/L (ref 99–110)
CHOLESTEROL, TOTAL: 162 MG/DL (ref 0–199)
CO2: 21 MMOL/L (ref 21–32)
CREAT SERPL-MCNC: 0.5 MG/DL (ref 0.6–1.2)
CREATININE URINE: 133.9 MG/DL (ref 28–259)
EOSINOPHILS ABSOLUTE: 0 K/UL (ref 0–0.6)
EOSINOPHILS RELATIVE PERCENT: 0.6 %
GFR AFRICAN AMERICAN: >60
GFR NON-AFRICAN AMERICAN: >60
GLOBULIN: 3.3 G/DL
GLUCOSE BLD-MCNC: 192 MG/DL (ref 70–99)
HCT VFR BLD CALC: 42.9 % (ref 36–48)
HDLC SERPL-MCNC: 40 MG/DL (ref 40–60)
HEMOGLOBIN: 14.7 G/DL (ref 12–16)
LDL CHOLESTEROL CALCULATED: 81 MG/DL
LYMPHOCYTES ABSOLUTE: 2.1 K/UL (ref 1–5.1)
LYMPHOCYTES RELATIVE PERCENT: 34 %
MCH RBC QN AUTO: 33 PG (ref 26–34)
MCHC RBC AUTO-ENTMCNC: 34.4 G/DL (ref 31–36)
MCV RBC AUTO: 96 FL (ref 80–100)
MICROALBUMIN UR-MCNC: 10.1 MG/DL
MICROALBUMIN/CREAT UR-RTO: 75.4 MG/G (ref 0–30)
MONOCYTES ABSOLUTE: 0.4 K/UL (ref 0–1.3)
MONOCYTES RELATIVE PERCENT: 6.6 %
NEUTROPHILS ABSOLUTE: 3.7 K/UL (ref 1.7–7.7)
NEUTROPHILS RELATIVE PERCENT: 58.3 %
PDW BLD-RTO: 14 % (ref 12.4–15.4)
PLATELET # BLD: 167 K/UL (ref 135–450)
PMV BLD AUTO: 9.2 FL (ref 5–10.5)
POTASSIUM SERPL-SCNC: 3.5 MMOL/L (ref 3.5–5.1)
RBC # BLD: 4.47 M/UL (ref 4–5.2)
SODIUM BLD-SCNC: 143 MMOL/L (ref 136–145)
T4 FREE: 0.9 NG/DL (ref 0.9–1.8)
TOTAL PROTEIN: 7.9 G/DL (ref 6.4–8.2)
TRIGL SERPL-MCNC: 204 MG/DL (ref 0–150)
TSH SERPL DL<=0.05 MIU/L-ACNC: 4.73 UIU/ML (ref 0.27–4.2)
VITAMIN B-12: 225 PG/ML (ref 211–911)
VLDLC SERPL CALC-MCNC: 41 MG/DL
WBC # BLD: 6.3 K/UL (ref 4–11)

## 2020-01-31 PROCEDURE — 99214 OFFICE O/P EST MOD 30 MIN: CPT | Performed by: FAMILY MEDICINE

## 2020-01-31 ASSESSMENT — ENCOUNTER SYMPTOMS
EYES NEGATIVE: 1
GASTROINTESTINAL NEGATIVE: 1
RESPIRATORY NEGATIVE: 1
ALLERGIC/IMMUNOLOGIC NEGATIVE: 1

## 2020-02-01 LAB
ESTIMATED AVERAGE GLUCOSE: 125.5 MG/DL
HBA1C MFR BLD: 6 %

## 2020-02-10 ENCOUNTER — TELEPHONE (OUTPATIENT)
Dept: PRIMARY CARE CLINIC | Age: 75
End: 2020-02-10

## 2020-02-11 ENCOUNTER — TELEPHONE (OUTPATIENT)
Dept: PRIMARY CARE CLINIC | Age: 75
End: 2020-02-11

## 2020-10-05 ENCOUNTER — TELEPHONE (OUTPATIENT)
Dept: PRIMARY CARE CLINIC | Age: 75
End: 2020-10-05

## 2020-10-05 NOTE — TELEPHONE ENCOUNTER
Not sure whay the notes is trying to say but the patients caregiver called today to confirm appointment.

## 2020-10-19 ENCOUNTER — APPOINTMENT (OUTPATIENT)
Dept: CT IMAGING | Age: 75
DRG: 871 | End: 2020-10-19
Payer: MEDICARE

## 2020-10-19 ENCOUNTER — HOSPITAL ENCOUNTER (INPATIENT)
Age: 75
LOS: 5 days | Discharge: HOME HEALTH | DRG: 871 | End: 2020-10-24
Attending: EMERGENCY MEDICINE | Admitting: INTERNAL MEDICINE
Payer: MEDICARE

## 2020-10-19 ENCOUNTER — APPOINTMENT (OUTPATIENT)
Dept: GENERAL RADIOLOGY | Age: 75
DRG: 871 | End: 2020-10-19
Payer: MEDICARE

## 2020-10-19 PROBLEM — N39.0 UTI (URINARY TRACT INFECTION): Status: ACTIVE | Noted: 2020-10-19

## 2020-10-19 LAB
A/G RATIO: 1.2 (ref 1.1–2.2)
A/G RATIO: 1.3 (ref 1.1–2.2)
ABO/RH: NORMAL
ALBUMIN SERPL-MCNC: 3.8 G/DL (ref 3.4–5)
ALBUMIN SERPL-MCNC: 4.3 G/DL (ref 3.4–5)
ALP BLD-CCNC: 57 U/L (ref 40–129)
ALP BLD-CCNC: 59 U/L (ref 40–129)
ALT SERPL-CCNC: 21 U/L (ref 10–40)
ALT SERPL-CCNC: 22 U/L (ref 10–40)
ANION GAP SERPL CALCULATED.3IONS-SCNC: 15 MMOL/L (ref 3–16)
ANION GAP SERPL CALCULATED.3IONS-SCNC: 15 MMOL/L (ref 3–16)
ANTIBODY SCREEN: NORMAL
AST SERPL-CCNC: 28 U/L (ref 15–37)
AST SERPL-CCNC: 35 U/L (ref 15–37)
BACTERIA: ABNORMAL /HPF
BASE EXCESS VENOUS: -2.9 MMOL/L (ref -3–3)
BASOPHILS ABSOLUTE: 0 K/UL (ref 0–0.2)
BASOPHILS ABSOLUTE: 0 K/UL (ref 0–0.2)
BASOPHILS RELATIVE PERCENT: 0.4 %
BASOPHILS RELATIVE PERCENT: 0.5 %
BILIRUB SERPL-MCNC: 0.6 MG/DL (ref 0–1)
BILIRUB SERPL-MCNC: 0.8 MG/DL (ref 0–1)
BILIRUBIN URINE: NEGATIVE
BLOOD, URINE: NEGATIVE
BUN BLDV-MCNC: 12 MG/DL (ref 7–20)
BUN BLDV-MCNC: 14 MG/DL (ref 7–20)
CALCIUM SERPL-MCNC: 8.5 MG/DL (ref 8.3–10.6)
CALCIUM SERPL-MCNC: 9.3 MG/DL (ref 8.3–10.6)
CARBOXYHEMOGLOBIN: 2.2 % (ref 0–1.5)
CHLORIDE BLD-SCNC: 110 MMOL/L (ref 99–110)
CHLORIDE BLD-SCNC: 111 MMOL/L (ref 99–110)
CLARITY: ABNORMAL
CO2: 16 MMOL/L (ref 21–32)
CO2: 17 MMOL/L (ref 21–32)
COLOR: YELLOW
CREAT SERPL-MCNC: 0.6 MG/DL (ref 0.6–1.2)
CREAT SERPL-MCNC: 0.7 MG/DL (ref 0.6–1.2)
D DIMER: 254 NG/ML DDU (ref 0–229)
EKG ATRIAL RATE: 61 BPM
EKG DIAGNOSIS: NORMAL
EKG P AXIS: 23 DEGREES
EKG P-R INTERVAL: 182 MS
EKG Q-T INTERVAL: 432 MS
EKG QRS DURATION: 76 MS
EKG QTC CALCULATION (BAZETT): 434 MS
EKG R AXIS: 39 DEGREES
EKG T AXIS: 83 DEGREES
EKG VENTRICULAR RATE: 61 BPM
EOSINOPHILS ABSOLUTE: 0 K/UL (ref 0–0.6)
EOSINOPHILS ABSOLUTE: 0 K/UL (ref 0–0.6)
EOSINOPHILS RELATIVE PERCENT: 0.4 %
EOSINOPHILS RELATIVE PERCENT: 0.6 %
EPITHELIAL CELLS, UA: 13 /HPF (ref 0–5)
FIBRINOGEN: 294 MG/DL (ref 200–397)
GFR AFRICAN AMERICAN: >60
GFR AFRICAN AMERICAN: >60
GFR NON-AFRICAN AMERICAN: >60
GFR NON-AFRICAN AMERICAN: >60
GLOBULIN: 3.2 G/DL
GLOBULIN: 3.2 G/DL
GLUCOSE BLD-MCNC: 106 MG/DL (ref 70–99)
GLUCOSE BLD-MCNC: 146 MG/DL (ref 70–99)
GLUCOSE URINE: NEGATIVE MG/DL
HCO3 VENOUS: 21.4 MMOL/L (ref 23–29)
HCT VFR BLD CALC: 42.2 % (ref 36–48)
HCT VFR BLD CALC: 42.9 % (ref 36–48)
HEMOGLOBIN: 14.3 G/DL (ref 12–16)
HEMOGLOBIN: 14.7 G/DL (ref 12–16)
INR BLD: 0.96 (ref 0.86–1.14)
INR BLD: 0.97 (ref 0.86–1.14)
KETONES, URINE: NEGATIVE MG/DL
LACTATE DEHYDROGENASE: 243 U/L (ref 100–190)
LACTIC ACID, SEPSIS: 2.8 MMOL/L (ref 0.4–1.9)
LACTIC ACID, SEPSIS: 3.2 MMOL/L (ref 0.4–1.9)
LEUKOCYTE ESTERASE, URINE: ABNORMAL
LIPASE: 35 U/L (ref 13–60)
LYMPHOCYTES ABSOLUTE: 2.5 K/UL (ref 1–5.1)
LYMPHOCYTES ABSOLUTE: 2.7 K/UL (ref 1–5.1)
LYMPHOCYTES RELATIVE PERCENT: 33.9 %
LYMPHOCYTES RELATIVE PERCENT: 41.8 %
MCH RBC QN AUTO: 32.8 PG (ref 26–34)
MCH RBC QN AUTO: 33.5 PG (ref 26–34)
MCHC RBC AUTO-ENTMCNC: 33.9 G/DL (ref 31–36)
MCHC RBC AUTO-ENTMCNC: 34.2 G/DL (ref 31–36)
MCV RBC AUTO: 95.9 FL (ref 80–100)
MCV RBC AUTO: 98.7 FL (ref 80–100)
METHEMOGLOBIN VENOUS: 0.1 %
MICROSCOPIC EXAMINATION: YES
MONOCYTES ABSOLUTE: 0.4 K/UL (ref 0–1.3)
MONOCYTES ABSOLUTE: 0.6 K/UL (ref 0–1.3)
MONOCYTES RELATIVE PERCENT: 6.7 %
MONOCYTES RELATIVE PERCENT: 8.3 %
NEUTROPHILS ABSOLUTE: 3.3 K/UL (ref 1.7–7.7)
NEUTROPHILS ABSOLUTE: 4.2 K/UL (ref 1.7–7.7)
NEUTROPHILS RELATIVE PERCENT: 50.7 %
NEUTROPHILS RELATIVE PERCENT: 56.7 %
NITRITE, URINE: POSITIVE
O2 CONTENT, VEN: 23 VOL %
O2 SAT, VEN: 99 %
O2 THERAPY: ABNORMAL
PCO2, VEN: 35.7 MMHG (ref 40–50)
PDW BLD-RTO: 13.3 % (ref 12.4–15.4)
PDW BLD-RTO: 13.9 % (ref 12.4–15.4)
PH UA: 6 (ref 5–8)
PH VENOUS: 7.39 (ref 7.35–7.45)
PLATELET # BLD: 160 K/UL (ref 135–450)
PLATELET # BLD: 174 K/UL (ref 135–450)
PMV BLD AUTO: 8.6 FL (ref 5–10.5)
PMV BLD AUTO: 8.7 FL (ref 5–10.5)
PO2, VEN: 121 MMHG (ref 25–40)
POTASSIUM REFLEX MAGNESIUM: 3.8 MMOL/L (ref 3.5–5.1)
POTASSIUM REFLEX MAGNESIUM: 4.9 MMOL/L (ref 3.5–5.1)
PRO-BNP: 43 PG/ML (ref 0–449)
PROCALCITONIN: 0.08 NG/ML (ref 0–0.15)
PROTEIN UA: ABNORMAL MG/DL
PROTHROMBIN TIME: 11.1 SEC (ref 10–13.2)
PROTHROMBIN TIME: 11.3 SEC (ref 10–13.2)
RBC # BLD: 4.28 M/UL (ref 4–5.2)
RBC # BLD: 4.48 M/UL (ref 4–5.2)
RBC UA: 3 /HPF (ref 0–4)
REASON FOR REJECTION: NORMAL
REJECTED TEST: NORMAL
SODIUM BLD-SCNC: 142 MMOL/L (ref 136–145)
SODIUM BLD-SCNC: 142 MMOL/L (ref 136–145)
SPECIFIC GRAVITY UA: 1.02 (ref 1–1.03)
TCO2 CALC VENOUS: 51 MMOL/L
TOTAL PROTEIN: 7 G/DL (ref 6.4–8.2)
TOTAL PROTEIN: 7.5 G/DL (ref 6.4–8.2)
TROPONIN: <0.01 NG/ML
URINE REFLEX TO CULTURE: YES
URINE TYPE: ABNORMAL
UROBILINOGEN, URINE: 0.2 E.U./DL
WBC # BLD: 6.5 K/UL (ref 4–11)
WBC # BLD: 7.4 K/UL (ref 4–11)
WBC UA: 14 /HPF (ref 0–5)

## 2020-10-19 PROCEDURE — 84443 ASSAY THYROID STIM HORMONE: CPT

## 2020-10-19 PROCEDURE — 81001 URINALYSIS AUTO W/SCOPE: CPT

## 2020-10-19 PROCEDURE — 72125 CT NECK SPINE W/O DYE: CPT

## 2020-10-19 PROCEDURE — 87040 BLOOD CULTURE FOR BACTERIA: CPT

## 2020-10-19 PROCEDURE — 6360000002 HC RX W HCPCS: Performed by: PHYSICIAN ASSISTANT

## 2020-10-19 PROCEDURE — G0378 HOSPITAL OBSERVATION PER HR: HCPCS

## 2020-10-19 PROCEDURE — 2580000003 HC RX 258: Performed by: PHYSICIAN ASSISTANT

## 2020-10-19 PROCEDURE — 70450 CT HEAD/BRAIN W/O DYE: CPT

## 2020-10-19 PROCEDURE — 87150 DNA/RNA AMPLIFIED PROBE: CPT

## 2020-10-19 PROCEDURE — 86850 RBC ANTIBODY SCREEN: CPT

## 2020-10-19 PROCEDURE — 87186 SC STD MICRODIL/AGAR DIL: CPT

## 2020-10-19 PROCEDURE — U0002 COVID-19 LAB TEST NON-CDC: HCPCS

## 2020-10-19 PROCEDURE — 80053 COMPREHEN METABOLIC PANEL: CPT

## 2020-10-19 PROCEDURE — 83690 ASSAY OF LIPASE: CPT

## 2020-10-19 PROCEDURE — 83605 ASSAY OF LACTIC ACID: CPT

## 2020-10-19 PROCEDURE — 83880 ASSAY OF NATRIURETIC PEPTIDE: CPT

## 2020-10-19 PROCEDURE — 2060000000 HC ICU INTERMEDIATE R&B

## 2020-10-19 PROCEDURE — 83615 LACTATE (LD) (LDH) ENZYME: CPT

## 2020-10-19 PROCEDURE — 84145 PROCALCITONIN (PCT): CPT

## 2020-10-19 PROCEDURE — 86900 BLOOD TYPING SEROLOGIC ABO: CPT

## 2020-10-19 PROCEDURE — 93005 ELECTROCARDIOGRAM TRACING: CPT | Performed by: PHYSICIAN ASSISTANT

## 2020-10-19 PROCEDURE — 85730 THROMBOPLASTIN TIME PARTIAL: CPT

## 2020-10-19 PROCEDURE — 36415 COLL VENOUS BLD VENIPUNCTURE: CPT

## 2020-10-19 PROCEDURE — U0003 INFECTIOUS AGENT DETECTION BY NUCLEIC ACID (DNA OR RNA); SEVERE ACUTE RESPIRATORY SYNDROME CORONAVIRUS 2 (SARS-COV-2) (CORONAVIRUS DISEASE [COVID-19]), AMPLIFIED PROBE TECHNIQUE, MAKING USE OF HIGH THROUGHPUT TECHNOLOGIES AS DESCRIBED BY CMS-2020-01-R: HCPCS

## 2020-10-19 PROCEDURE — 2580000003 HC RX 258: Performed by: INTERNAL MEDICINE

## 2020-10-19 PROCEDURE — 84439 ASSAY OF FREE THYROXINE: CPT

## 2020-10-19 PROCEDURE — 87086 URINE CULTURE/COLONY COUNT: CPT

## 2020-10-19 PROCEDURE — 99285 EMERGENCY DEPT VISIT HI MDM: CPT

## 2020-10-19 PROCEDURE — 82803 BLOOD GASES ANY COMBINATION: CPT

## 2020-10-19 PROCEDURE — 71045 X-RAY EXAM CHEST 1 VIEW: CPT

## 2020-10-19 PROCEDURE — 82728 ASSAY OF FERRITIN: CPT

## 2020-10-19 PROCEDURE — 87077 CULTURE AEROBIC IDENTIFY: CPT

## 2020-10-19 PROCEDURE — 93010 ELECTROCARDIOGRAM REPORT: CPT | Performed by: INTERNAL MEDICINE

## 2020-10-19 PROCEDURE — 85610 PROTHROMBIN TIME: CPT

## 2020-10-19 PROCEDURE — 85025 COMPLETE CBC W/AUTO DIFF WBC: CPT

## 2020-10-19 PROCEDURE — 84484 ASSAY OF TROPONIN QUANT: CPT

## 2020-10-19 PROCEDURE — 86901 BLOOD TYPING SEROLOGIC RH(D): CPT

## 2020-10-19 PROCEDURE — 85379 FIBRIN DEGRADATION QUANT: CPT

## 2020-10-19 PROCEDURE — 6360000002 HC RX W HCPCS: Performed by: INTERNAL MEDICINE

## 2020-10-19 PROCEDURE — 72131 CT LUMBAR SPINE W/O DYE: CPT

## 2020-10-19 PROCEDURE — 85384 FIBRINOGEN ACTIVITY: CPT

## 2020-10-19 RX ORDER — SODIUM CHLORIDE 0.9 % (FLUSH) 0.9 %
10 SYRINGE (ML) INJECTION EVERY 12 HOURS SCHEDULED
Status: DISCONTINUED | OUTPATIENT
Start: 2020-10-19 | End: 2020-10-24 | Stop reason: HOSPADM

## 2020-10-19 RX ORDER — ACETAMINOPHEN 650 MG/1
650 SUPPOSITORY RECTAL EVERY 6 HOURS PRN
Status: DISCONTINUED | OUTPATIENT
Start: 2020-10-19 | End: 2020-10-24 | Stop reason: HOSPADM

## 2020-10-19 RX ORDER — SODIUM CHLORIDE 9 MG/ML
30 INJECTION, SOLUTION INTRAVENOUS ONCE
Status: COMPLETED | OUTPATIENT
Start: 2020-10-19 | End: 2020-10-19

## 2020-10-19 RX ORDER — PROMETHAZINE HYDROCHLORIDE 25 MG/1
12.5 TABLET ORAL EVERY 6 HOURS PRN
Status: DISCONTINUED | OUTPATIENT
Start: 2020-10-19 | End: 2020-10-24 | Stop reason: HOSPADM

## 2020-10-19 RX ORDER — POLYETHYLENE GLYCOL 3350 17 G/17G
17 POWDER, FOR SOLUTION ORAL DAILY PRN
Status: DISCONTINUED | OUTPATIENT
Start: 2020-10-19 | End: 2020-10-24 | Stop reason: HOSPADM

## 2020-10-19 RX ORDER — ACETAMINOPHEN 325 MG/1
650 TABLET ORAL EVERY 6 HOURS PRN
Status: DISCONTINUED | OUTPATIENT
Start: 2020-10-19 | End: 2020-10-24 | Stop reason: HOSPADM

## 2020-10-19 RX ORDER — ONDANSETRON 2 MG/ML
4 INJECTION INTRAMUSCULAR; INTRAVENOUS EVERY 6 HOURS PRN
Status: DISCONTINUED | OUTPATIENT
Start: 2020-10-19 | End: 2020-10-24 | Stop reason: HOSPADM

## 2020-10-19 RX ORDER — SODIUM CHLORIDE 0.9 % (FLUSH) 0.9 %
10 SYRINGE (ML) INJECTION PRN
Status: DISCONTINUED | OUTPATIENT
Start: 2020-10-19 | End: 2020-10-24 | Stop reason: HOSPADM

## 2020-10-19 RX ORDER — SODIUM CHLORIDE 9 MG/ML
INJECTION, SOLUTION INTRAVENOUS CONTINUOUS
Status: DISCONTINUED | OUTPATIENT
Start: 2020-10-19 | End: 2020-10-20

## 2020-10-19 RX ADMIN — Medication 10 ML: at 20:45

## 2020-10-19 RX ADMIN — CEFEPIME HYDROCHLORIDE 1 G: 1 INJECTION, POWDER, FOR SOLUTION INTRAMUSCULAR; INTRAVENOUS at 18:00

## 2020-10-19 RX ADMIN — ENOXAPARIN SODIUM 40 MG: 40 INJECTION SUBCUTANEOUS at 21:21

## 2020-10-19 RX ADMIN — Medication 10 ML: at 20:44

## 2020-10-19 RX ADMIN — SODIUM CHLORIDE: 9 INJECTION, SOLUTION INTRAVENOUS at 21:19

## 2020-10-19 RX ADMIN — SODIUM CHLORIDE 1779 ML: 9 INJECTION, SOLUTION INTRAVENOUS at 18:00

## 2020-10-19 ASSESSMENT — ENCOUNTER SYMPTOMS
DIARRHEA: 0
BACK PAIN: 1
SHORTNESS OF BREATH: 0
ABDOMINAL PAIN: 0
VOMITING: 0
CHEST TIGHTNESS: 0
NAUSEA: 0

## 2020-10-19 NOTE — ED NOTES
Pt is addiment that she would like to go home. States, \"only fell, because I don't have food. \"  Shannon provided; Pamella Angulo social work made aware.        Mayte Gonzalez RN  10/19/20 2062

## 2020-10-19 NOTE — ED PROVIDER NOTES
I independently performed a history and physical on Robbie Nettles. All diagnostic, treatment, and disposition decisions were made by myself in conjunction with the advanced practice provider. Briefly, this is a 66 y.o. female here for multiple falls. She does not know why she fell. States that her caretakers have been withholding food from her. Denies any pain. Does state that she has been feeling depressed and has a sadness in her heart but denies chest pain when I asked her specifically. No dizziness. States that her legs feel weak and cannot support her. Denies fever or chills or cough. .    On exam,   General: Patient is weak appearing  Skin: No cyanosis  HEENT: Moist mucous membranes  Heart: Regular rate, regular rhythm  Lung: No respiratory distress  Abdomen: Soft, nontender  Neuro: Moving all extremities, no facial droop, no slurred speech, answers questions appropriately        EKG  The Ekg interpreted by me in the absence of a cardiologist shows. Normal sinus rhythm  No acute ST changes or T wave abnormalities     Screenings            MDM  Patient is a 49-year-old woman who presents with multiple falls. No preceding symptoms. Patient has poor memory recall. May be due to syncopal events. Currently only feels weak. Will check for metabolic and infectious etiologies. May be due to polypharmacy or deconditioning as well. Found nitrite positive in urinalysis. Also found elevated lactic acid. May have urosepsis. Started on cefepime and admitted to hospitalist service. Starting on IV fluids with goal 30 cc/kg ideal body weight. The total critical care time spent while evaluating and treating this patient was at least 25 minutes. This excludes time spent doing separately billable procedures.   This includes time at the bedside, data interpretation, medication management, obtaining critical history from collateral sources if the patient is unable to provide it directly, and physician consultation. Specifics of interventions taken and potentially life-threatening diagnostic considerations are listed above in the medical decision making. Patient Referrals:  No follow-up provider specified. Discharge Medications:  New Prescriptions    No medications on file       FINAL IMPRESSION  1. Sepsis, due to unspecified organism, unspecified whether acute organ dysfunction present ([facility] [address]    2. General weakness    3. Fall, initial encounter    4. Syncope and collapse    5. Lactic acidosis    6. Bacterial urinary tract infection        Blood pressure 130/60, pulse 74, temperature 98 °F (36.7 °C), resp. rate 21, height 5' 6\" (1.676 m), weight 155 lb (70.3 kg), SpO2 93 %, not currently breastfeeding. For further details of [facility] emergency department encounter, please see documentation by advanced practice provider, Tierney Alejo.        Micaela Matthew MD  10/19/20 9180

## 2020-10-19 NOTE — ED NOTES
BP cuff & monitors removed by pt. Ambulating around room; states, \"would like to go home. \"  Assisted back to bed without incident; BP cuff & monitors applied.        Jl Valdovinos RN  10/19/20 5881

## 2020-10-19 NOTE — ED NOTES
Arrived by EMS rt fall sustained at home. Denies hitting her head or blood thinners. Landed on her bottom; no pain reported.       Ankur Cristobal RN  10/19/20 9787

## 2020-10-19 NOTE — ED PROVIDER NOTES
[facility] [facility]        Pt Name: Sharri Pena  MRN: [id number]  [place] 1945  Date of evaluation: 10/19/2020  Provider: Chasity Rivera PA-C  PCP: Fish Giraldo MD     I have seen and evaluated this patient with my supervising physician Micaela Matthew MD.    [facility]       Chief Complaint   Patient presents with   Campbell Fall     Arrived by EMS rt fall sustained at home. Denies hitting her head or blood thinners. Landed on her bottom; no pain reported. HISTORY OF PRESENT ILLNESS   (Location, Timing/Onset, Context/Setting, Quality, Duration, Modifying Factors, Severity, Associated Signs and Symptoms)  Note limiting factors. Sharri Pena is a 66 y.o. female presents to the emergency department via emergency medical services for the home environment with reports of a fall. Upon questioning the patient is unsure what caused her to fall. When asked if there is a possibility that she felt funny and passed out she said she felt fine but does believe that she passed out. Patient states she had been walking from one room to another. She states that she does not believe that she hit her head but reports that she does remember waking up on the ground. She is not currently anticoagulated. She states she did remember landing on her buttock and reports that there is just minimal amounts of discomfort noted in her low back. She states otherwise she is compliant. She states she is not currently experiencing any significant symptoms related to chest pain, palpitations lightheadedness or shortness of breath. She has no reports of abdominal or flank pain. No nausea vomiting or diarrhea. No fevers chills or cough. Patient states that she has never had syncope in the past.  She goes on to report she has no other associated complaints or concerns presents to the ED for evaluation and treatment.     Nursing Notes were all reviewed and agreed with or any disagreements were addressed in the HPI. REVIEW OF SYSTEMS    (2-9 systems for level 4, 10 or more for level 5)     Review of Systems   Constitutional: Negative for activity change, chills and fever. Respiratory: Negative for chest tightness and shortness of breath. Cardiovascular: Negative for chest pain. Gastrointestinal: Negative for abdominal pain, diarrhea, nausea and vomiting. Genitourinary: Negative for dysuria and flank pain. Musculoskeletal: Positive for back pain. Neurological: Positive for syncope. Negative for dizziness and light-headedness. Positives and Pertinent negatives as per HPI. Except as noted above in the ROS, all other systems were reviewed and negative. PAST MEDICAL HISTORY     Past Medical History:   Diagnosis Date    Anxiety     Hypothyroidism     Irritable bowel syndrome     Prediabetes          SURGICAL HISTORY     Past Surgical History:   Procedure Laterality Date    HYSTERECTOMY           CURRENTMEDICATIONS       Previous Medications    No medications on file         ALLERGIES     Patient has no known allergies. FAMILYHISTORY       Family History   Problem Relation Age of Onset    Thyroid Disease Sister     No Known Problems Sister     No Known Problems Son     No Known Problems Son     No Known Problems Daughter           SOCIAL HISTORY       Social History     Tobacco Use    Smoking status: Never Smoker    Smokeless tobacco: Never Used   Substance Use Topics    Alcohol use: No    Drug use: Not on file       SCREENINGS             PHYSICAL EXAM    (up to 7 for level 4, 8 or more for level 5)     ED Triage Vitals [10/19/20 1311]   BP Temp Temp src Pulse Resp SpO2 Height Weight   (!) 157/74 98 °F (36.7 °C) -- 72 18 97 % 5' 6\" (1.676 m) 155 lb (70.3 kg)       Physical Exam  Vitals signs and nursing note reviewed. Constitutional:       General: She is awake. She is not in acute distress. Appearance: Normal appearance. Notable for the following components:       Result Value    CO2 17 (*)     Glucose 106 (*)     All other components within normal limits    Narrative:     Performed at:  [facility] [address]   Phone [phone]   URINE RT REFLEX TO CULTURE - Abnormal; Notable for the following components:    Clarity, UA CLOUDY (*)     Protein, UA TRACE (*)     Nitrite, Urine POSITIVE (*)     Leukocyte Esterase, Urine SMALL (*)     All other components within normal limits    Narrative:     Performed at:  [facility] [address] [facility] [address]   Phone [phone]   LACTATE, SEPSIS - Abnormal; Notable for the following components:    Lactic Acid, Sepsis 2.8 (*)     All other components within normal limits    Narrative:     Performed at:  [facility] [address]   Phone [phone]   BLOOD GAS, VENOUS - Abnormal; Notable for the following components:    pCO2, Tommy 35.7 (*)     pO2, Tommy 121.0 (*)     HCO3, Venous 21.4 (*)     Carboxyhemoglobin 2.2 (*)     All other components within normal limits    Narrative:     Performed at:  [facility] [address] [place]   Phone [phone]   CULTURE, BLOOD 1   CULTURE, BLOOD 2   CBC WITH AUTO DIFFERENTIAL    Narrative:     Performed at:  [facility] [address] [facility] [address]   Phone [phone]   TROPONIN    Narrative:     Performed at:  [facility] [address], [place]   Phone [phone]   BRAIN NATRIURETIC PEPTIDE    Narrative:     Performed at:  [facility] [address]   Phone [phone]   PROTIME-INR    Narrative:     Performed at:  [facility]  [address] [facility] [address] [place] [address]   Phone [phone]   LIPASE    Narrative:     Performed at:  [facility] [address], [address]   Phone [phone]   LACTATE, SEPSIS   MICROSCOPIC URINALYSIS       All other labs were within normal range or not returned as of this dictation. EKG: All EKG's are interpreted by the Emergency Department Physician in the absence of a cardiologist.  Please see their note for interpretation of EKG. RADIOLOGY:   Non-plain film images such as CT, Ultrasound and MRI are read by the radiologist. Plain radiographic images are visualized and preliminarily interpreted by the ED Provider with the below findings:      Interpretation per the Radiologist below, if available at the time of this note:    CT LUMBAR SPINE WO CONTRAST   Final Result   No acute fracture or subluxation of the lumbar spine. Multilevel spondylosis of the lumbar spine, including moderate central   stenosis at L3-4 and L4-5. CT Cervical Spine WO Contrast   Final Result   No acute fracture or subluxation. Spondylosis is noted at C5-6 and C6-7. CT Head WO Contrast   Final Result   No acute intracranial abnormality. Atrophy and white matter changes consistent with chronic small vessel   ischemic disease. XR CHEST PORTABLE   Final Result   Findings as above which may be related to congestive heart failure and mild   edema. Viral infection could also have similar appearance.              PROCEDURES   Unless otherwise noted below, none     Procedures    CRITICAL CARE TIME   SEP-1 CORE MEASURE DATA    Classification: sepsis    Amount of fluids ordered: at least 30mL/kg based on ideal body weight due to obesity defined as BMI >30 (patient's BMI is Body mass index is 25.02 kg/m².)    Time at which sepsis was identified: 1745    Broad-spectrum antibiotics chosen: based on sepsis order-set for a suspected source of: UTI    Repeat lactate level: pending    On reassessment after fluid resuscitation:   Vitals update: BP (!) 134/59   Pulse 66   Temp 98 °F (36.7 °C)   Resp 18   Ht 5' 6\" (1.676 m)   Wt 155 lb (70.3 kg)   SpO2 97%   BMI 25.02 kg/m² , cardiopulmonary exam: Unchanged, capillary refill: Unchanged, peripheral pulses: Unchanged, skin examination: Unchanged. CONSULTS:  None      EMERGENCY DEPARTMENT COURSE and DIFFERENTIAL DIAGNOSIS/MDM:   Vitals:    Vitals:    10/19/20 1311 10/19/20 1430 10/19/20 1445 10/19/20 1600   BP: (!) 157/74 (!) 105/55 124/66 (!) 134/59   Pulse: 72  66    Resp: 18      Temp: 98 °F (36.7 °C)      SpO2: 97%  97% 97%   Weight: 155 lb (70.3 kg)      Height: 5' 6\" (1.676 m)          Patient was given the following medications:  Medications   cefepime (MAXIPIME) 1 g in dextrose 5 % 50 mL IVPB (has no administration in time range)   sodium chloride flush 0.9 % injection 10 mL (has no administration in time range)   sodium chloride flush 0.9 % injection 10 mL (has no administration in time range)   0.9 % sodium chloride IV bolus 1,779 mL (has no administration in time range)           The patient's detailed history of present illness is documented as above. Upon arrival to the emergency department the patient's vital signs are as documented. The patient is noted to be hemodynamically stable and afebrile. Physical examination findings are as above. IV access was obtained. Laboratory testing and work-up was initiated. Initial EKG demonstrates a sinus rhythm with a rate of 61. Mild baseline wander. Normal axis and interval.  No evidence of acute ST elevation. Please see attending physician details for further EKG interpretation potential comparison. CBC demonstrates no evidence of leukocytosis anemia and/or thrombocytopenia. BUN is 12 creatinine is 0.6 nonfasting glucose 106 and electrolytes are as documented CO2 is low at 19 but she has a normal gap of 15. Troponin is less than 0.01.  proBNP is 43. Coags are unremarkable.   Lipase is 35. Lactic acid is elevated at 2.8 but at the present time we do not have any potential source even though the patient's urinalysis is outstanding and straight cath is been ordered. Portable chest x-ray demonstrates the possibility of congestive heart failure versus edema versus viral infection. There are no signs and symptoms of fluid overload. CT the head demonstrates no evidence of acute intracranial abnormality. Atrophy and white matter changes consistent with small vessel disease is noted. CT cervical spine demonstrates no evidence of acute fracture and/or subluxation there is evidence of spondylosis most prominent on C5-6 as well C6 on C7. CT lumbar spine similarly demonstrates no evidence of acute fracture or dislocation and there is multilevel spondylosis with moderate central canal stenosis at L3 on 4 as well as 4 and 5. Questions arise if the patient syncope was possibly related to urosepsis. Patient will require ongoing care management on inpatient basis. She has been given ideal body weight sepsis fluid at 1700 cc and is also been treated with cefepime. Case was discussed directly with the hospitalist.  Patient will be admitted to the medical surgical services for ongoing care management the diagnoses below. FINAL IMPRESSION      1. Sepsis, due to unspecified organism, unspecified whether acute organ dysfunction present ([facility] [address]    2. General weakness    3. Fall, initial encounter    4. Syncope and collapse    5. Lactic acidosis    6. Bacterial urinary tract infection          DISPOSITION/PLAN   DISPOSITION: Admit medical stable condition.            (Please note that portions of this note were completed with a voice recognition program.  Efforts were made to edit the dictations but occasionally words are mis-transcribed.)    Skyla Garcia PA-C (electronically signed)           Talha Dias PA-C  10/19/20 8782

## 2020-10-19 NOTE — ED NOTES
Patient pulled out PIV, RN at bedside attempting.  Patient eating at this time     Enrique Archer RN  10/19/20 0626

## 2020-10-19 NOTE — H&P
Hospital Medicine History & Physical      PCP: Arzella Gaucher, MD    Date of Admission: 10/19/2020    Date of Service: Pt seen/examined on 10/19/2020 and Placed in Observation. Chief Complaint: Fall      History Of Present Illness: 66 y.o. female with no significant past medical history presented to the ER by EMS secondary to fall. Patient is very poor historian. Changes is her first language but she is pretty fluent in [place]. She does not appear encephalopathic and is AAO x3. However, patient is very difficult to obtain history from. She is very tangential, requires frequent reorientation. Does not answer even direct questions. It appears that she has fallen today at [facility]\". There appears to be no loss of consciousness. She states that she had multiple falling episodes in the past.  She denies any headache, dizziness, blurry vision, chest pain, palpitations, shortness of breath, nausea, diarrhea, dysuria. Denies any focal numbness or weakness. In the ED patient found to have UTI by urinalysis. Hospitalist consulted for admission in view of UTI and possible syncope. Past Medical History:          Diagnosis Date    Anxiety     Hypothyroidism     Irritable bowel syndrome     Prediabetes        Past Surgical History:          Procedure Laterality Date    HYSTERECTOMY         Medications Prior to Admission:      Prior to Admission medications    Not on File       Allergies:  Patient has no known allergies. Social History:      The patient currently lives [facility]    TOBACCO:   reports that she has never smoked. She has never used smokeless tobacco.  ETOH:   reports no history of alcohol use. E-Cigarettes Vaping or Juuling     Questions Responses    Vaping Use     Start Date     Does device contain nicotine? Quit Date     Vaping Type             Family History:       Reviewed in detail and negative for DM, CAD, Cancer, CVA.  Positive as follows: Problem Relation Age of Onset    Thyroid Disease Sister     No Known Problems Sister     No Known Problems Son     No Known Problems Son     No Known Problems Daughter        REVIEW OF SYSTEMS:   Pertinent positives as noted in the HPI. All other systems reviewed and negative. PHYSICAL EXAM PERFORMED:    BP (!) 134/59   Pulse 66   Temp 98 °F (36.7 °C)   Resp 18   Ht 5' 6\" (1.676 m)   Wt 155 lb (70.3 kg)   SpO2 97%   BMI 25.02 kg/m²     General appearance:  No apparent distress, appears stated age and cooperative. HEENT:  Normal cephalic, atraumatic without obvious deformity. Pupils equal, round, and reactive to light. Extra ocular muscles intact. Conjunctivae/corneas clear. Neck: Supple, with full range of motion. No jugular venous distention. Trachea midline. Respiratory:  Normal respiratory effort. Clear to auscultation, bilaterally without Rales/Wheezes/Rhonchi. Cardiovascular:  Regular rate and rhythm with normal S1/S2 without murmurs, rubs or gallops. Abdomen: Soft, non-tender, non-distended with normal bowel sounds. Musculoskeletal:  No clubbing, cyanosis or edema bilaterally. Full range of motion without deformity. Skin: Skin color, texture, turgor normal.  No rashes or lesions. Neurologic:  Neurovascularly intact without any focal sensory/motor deficits.  Cranial nerves: II-XII intact, grossly non-focal.  Psychiatric:  Alert and oriented, thought content appropriate, normal insight  Capillary Refill: Brisk,< 3 seconds   Peripheral Pulses: +2 palpable, equal bilaterally       Labs:     Recent Labs     10/19/20  1510   WBC 6.5   HGB 14.7   HCT 42.9        Recent Labs     10/19/20  1415      K 4.9      CO2 17*   BUN 12   CREATININE 0.6   CALCIUM 9.3     Recent Labs     10/19/20  1415   AST 35   ALT 22   BILITOT 0.8   ALKPHOS 59     Recent Labs     10/19/20  1415   INR 0.97     Recent Labs     10/19/20  1415   TROPONINI <0.01       Urinalysis:      Lab Results Component Value Date    NITRU POSITIVE 10/19/2020    WBCUA 14 10/19/2020    BACTERIA 4+ 10/19/2020    RBCUA 3 10/19/2020    BLOODU Negative 10/19/2020    SPECGRAV 1.022 10/19/2020    GLUCOSEU Negative 10/19/2020       Radiology:     CXR: I have reviewed the CXR with the following interpretation: Read as pulmonary edema versus viral pneumonia. EKG:  I have reviewed the EKG with the following interpretation: NSR, normal EKG    CT LUMBAR SPINE WO CONTRAST   Final Result   No acute fracture or subluxation of the lumbar spine. Multilevel spondylosis of the lumbar spine, including moderate central   stenosis at L3-4 and L4-5. CT Cervical Spine WO Contrast   Final Result   No acute fracture or subluxation. Spondylosis is noted at C5-6 and C6-7. CT Head WO Contrast   Final Result   No acute intracranial abnormality. Atrophy and white matter changes consistent with chronic small vessel   ischemic disease. XR CHEST PORTABLE   Final Result   Findings as above which may be related to congestive heart failure and mild   edema. Viral infection could also have similar appearance. ASSESSMENT:    Active Hospital Problems    Diagnosis Date Noted    UTI (urinary tract infection) [N39.0] 10/19/2020         PLAN:    UTI  Patient does not produce symptoms of dysuria  Given dose of cefepime in the ED, will continue with Rocephin    Fall/syncope  Syncope is not apparent from the history, however, patient is a very poor historian  Telemetry monitoring  CT head with no acute findings  Neurology consultation  Cardiology consultation  PT OT    History of hypothyroidism as per chart  Patient states does not take any medications  Follow-up TSH    CODE STATUS:  Patient does not appear to be cognitively appropriate at this time for CODE STATUS discussions.   Full code    DVT Prophylaxis: Lovenox  Diet: No diet orders on file  Code Status: No Order    Electronically signed by Cristin Serrano MD on 10/19/2020 at 6:13 PM

## 2020-10-19 NOTE — CARE COORDINATION
Discharge Planning Assessment    SW discharge planner met with patient to discuss reason for admission, current living situation, and potential needs at the time of discharge    Pt in ED d/t general weakness and fall    Demographics/Insurance verified:  Yes    Current type of dwelling:  [address] complex. Pt reported dissatisfaction with where she lives. Pt agreeable to referral to assist to find an independent or assisted living facility. Pt reported she prefers one that is [facility]. SW made referral to [facility] with [employer], [phone]. Living arrangements:  Alone in apartment    Level of function/Support:  Pt reports she uses a 4 prong cane at all time. Pt has had multiple falls lately. All her family lives out in [place]. Pt only has a friend, Allan Gayr, that is supportive. PCP:  Dr. Altagracia Valentine    Last Visit to PCP:  Stated sees regularly for the last 9 years. DME:  4 prong cane, walker    Active with any community resources/agencies/skilled home care:  No.  Pt reported difficulty cooking and cleaning at home. D/t multiple falls, could also benefit from lifeline services. Made referral to [facility].  Faxed facesheet and left message with Sharyle Pass, [phone]. Medication compliance issues:  No    Financial issues that could impact healthcare:  No    Tentative discharge plan:  Home most likely. Possibly [employer] referral.  Made referral to [employer] to assist with finding new independent or assisted living facility. Made referral to [address] [place] [address] for assist with non-skilled home services after discharge. Discussed with patient and/or family that on the day of discharge home tentative time of discharge will be between 10 AM and noon. Transportation at the time of discharge:  Friend may be able to assist.  May need Ly[employer].     Electronically signed by SOFIA Krishnan, LSW on 10/19/2020 at 5:07 PM

## 2020-10-19 NOTE — ED NOTES
Pt continues to remove BP cuff & monitors. Ambulating throughout unit; redirected back to room.        Christina Murrell RN  10/19/20 9658

## 2020-10-20 PROBLEM — N39.0 UTI (URINARY TRACT INFECTION): Status: ACTIVE | Noted: 2020-10-20

## 2020-10-20 PROBLEM — A49.9 BACTERIAL URINARY TRACT INFECTION: Status: ACTIVE | Noted: 2020-10-19

## 2020-10-20 LAB
A/G RATIO: 1.4 (ref 1.1–2.2)
ALBUMIN SERPL-MCNC: 3.1 G/DL (ref 3.4–5)
ALP BLD-CCNC: 44 U/L (ref 40–129)
ALT SERPL-CCNC: 16 U/L (ref 10–40)
ANION GAP SERPL CALCULATED.3IONS-SCNC: 10 MMOL/L (ref 3–16)
APTT: 28.6 SEC (ref 24.2–36.2)
AST SERPL-CCNC: 17 U/L (ref 15–37)
BASOPHILS ABSOLUTE: 0 K/UL (ref 0–0.2)
BASOPHILS RELATIVE PERCENT: 0.6 %
BILIRUB SERPL-MCNC: 0.7 MG/DL (ref 0–1)
BUN BLDV-MCNC: 11 MG/DL (ref 7–20)
CALCIUM IONIZED: 1.07 MMOL/L (ref 1.12–1.32)
CALCIUM SERPL-MCNC: 7 MG/DL (ref 8.3–10.6)
CHLORIDE BLD-SCNC: 115 MMOL/L (ref 99–110)
CO2: 17 MMOL/L (ref 21–32)
CREAT SERPL-MCNC: <0.5 MG/DL (ref 0.6–1.2)
D DIMER: 360 NG/ML DDU (ref 0–229)
EOSINOPHILS ABSOLUTE: 0.1 K/UL (ref 0–0.6)
EOSINOPHILS RELATIVE PERCENT: 1.1 %
FERRITIN: 399 NG/ML (ref 15–150)
FIBRINOGEN: 255 MG/DL (ref 200–397)
GFR AFRICAN AMERICAN: >60
GFR NON-AFRICAN AMERICAN: >60
GLOBULIN: 2.2 G/DL
GLUCOSE BLD-MCNC: 96 MG/DL (ref 70–99)
HCT VFR BLD CALC: 36.2 % (ref 36–48)
HEMOGLOBIN: 12.1 G/DL (ref 12–16)
INR BLD: 0.99 (ref 0.86–1.14)
LACTIC ACID: 2.4 MMOL/L (ref 0.4–2)
LYMPHOCYTES ABSOLUTE: 2 K/UL (ref 1–5.1)
LYMPHOCYTES RELATIVE PERCENT: 40.7 %
MAGNESIUM: 1.9 MG/DL (ref 1.8–2.4)
MCH RBC QN AUTO: 32.7 PG (ref 26–34)
MCHC RBC AUTO-ENTMCNC: 33.6 G/DL (ref 31–36)
MCV RBC AUTO: 97.3 FL (ref 80–100)
MONOCYTES ABSOLUTE: 0.5 K/UL (ref 0–1.3)
MONOCYTES RELATIVE PERCENT: 9.4 %
NEUTROPHILS ABSOLUTE: 2.3 K/UL (ref 1.7–7.7)
NEUTROPHILS RELATIVE PERCENT: 48.2 %
PDW BLD-RTO: 13.4 % (ref 12.4–15.4)
PH VENOUS: 7.45 (ref 7.35–7.45)
PHOSPHORUS: 2 MG/DL (ref 2.5–4.9)
PLATELET # BLD: 133 K/UL (ref 135–450)
PMV BLD AUTO: 8.8 FL (ref 5–10.5)
POTASSIUM REFLEX MAGNESIUM: 3 MMOL/L (ref 3.5–5.1)
POTASSIUM SERPL-SCNC: 3 MMOL/L (ref 3.5–5.1)
PROTHROMBIN TIME: 11.5 SEC (ref 10–13.2)
RBC # BLD: 3.72 M/UL (ref 4–5.2)
REPORT: NORMAL
SARS-COV-2: NOT DETECTED
SODIUM BLD-SCNC: 142 MMOL/L (ref 136–145)
T4 FREE: 0.7 NG/DL (ref 0.9–1.8)
TOTAL PROTEIN: 5.3 G/DL (ref 6.4–8.2)
TSH REFLEX: 12.81 UIU/ML (ref 0.27–4.2)
WBC # BLD: 4.8 K/UL (ref 4–11)

## 2020-10-20 PROCEDURE — 6370000000 HC RX 637 (ALT 250 FOR IP): Performed by: INTERNAL MEDICINE

## 2020-10-20 PROCEDURE — 83605 ASSAY OF LACTIC ACID: CPT

## 2020-10-20 PROCEDURE — 85025 COMPLETE CBC W/AUTO DIFF WBC: CPT

## 2020-10-20 PROCEDURE — G0378 HOSPITAL OBSERVATION PER HR: HCPCS

## 2020-10-20 PROCEDURE — 2060000000 HC ICU INTERMEDIATE R&B

## 2020-10-20 PROCEDURE — 2500000003 HC RX 250 WO HCPCS: Performed by: INTERNAL MEDICINE

## 2020-10-20 PROCEDURE — 80053 COMPREHEN METABOLIC PANEL: CPT

## 2020-10-20 PROCEDURE — 6360000002 HC RX W HCPCS: Performed by: INTERNAL MEDICINE

## 2020-10-20 PROCEDURE — 36415 COLL VENOUS BLD VENIPUNCTURE: CPT

## 2020-10-20 PROCEDURE — 2580000003 HC RX 258: Performed by: INTERNAL MEDICINE

## 2020-10-20 PROCEDURE — 85384 FIBRINOGEN ACTIVITY: CPT

## 2020-10-20 PROCEDURE — 85379 FIBRIN DEGRADATION QUANT: CPT

## 2020-10-20 PROCEDURE — 82330 ASSAY OF CALCIUM: CPT

## 2020-10-20 PROCEDURE — 85610 PROTHROMBIN TIME: CPT

## 2020-10-20 PROCEDURE — 83735 ASSAY OF MAGNESIUM: CPT

## 2020-10-20 PROCEDURE — 99205 OFFICE O/P NEW HI 60 MIN: CPT | Performed by: INTERNAL MEDICINE

## 2020-10-20 PROCEDURE — 2580000003 HC RX 258

## 2020-10-20 PROCEDURE — 85730 THROMBOPLASTIN TIME PARTIAL: CPT

## 2020-10-20 PROCEDURE — 84100 ASSAY OF PHOSPHORUS: CPT

## 2020-10-20 RX ORDER — LORAZEPAM 2 MG/ML
2 INJECTION INTRAMUSCULAR EVERY 6 HOURS PRN
Status: DISCONTINUED | OUTPATIENT
Start: 2020-10-20 | End: 2020-10-21

## 2020-10-20 RX ORDER — SODIUM BICARBONATE 650 MG/1
1300 TABLET ORAL 2 TIMES DAILY
Status: DISCONTINUED | OUTPATIENT
Start: 2020-10-20 | End: 2020-10-20

## 2020-10-20 RX ORDER — POTASSIUM CHLORIDE 7.45 MG/ML
10 INJECTION INTRAVENOUS PRN
Status: DISCONTINUED | OUTPATIENT
Start: 2020-10-20 | End: 2020-10-24 | Stop reason: HOSPADM

## 2020-10-20 RX ORDER — OLANZAPINE 10 MG/1
5 INJECTION, POWDER, LYOPHILIZED, FOR SOLUTION INTRAMUSCULAR EVERY 12 HOURS PRN
Status: DISCONTINUED | OUTPATIENT
Start: 2020-10-20 | End: 2020-10-20

## 2020-10-20 RX ORDER — ZIPRASIDONE MESYLATE 20 MG/ML
20 INJECTION, POWDER, LYOPHILIZED, FOR SOLUTION INTRAMUSCULAR ONCE
Status: COMPLETED | OUTPATIENT
Start: 2020-10-20 | End: 2020-10-20

## 2020-10-20 RX ORDER — POTASSIUM CHLORIDE 20 MEQ/1
40 TABLET, EXTENDED RELEASE ORAL ONCE
Status: COMPLETED | OUTPATIENT
Start: 2020-10-20 | End: 2020-10-20

## 2020-10-20 RX ORDER — POTASSIUM CHLORIDE 20 MEQ/1
40 TABLET, EXTENDED RELEASE ORAL PRN
Status: DISCONTINUED | OUTPATIENT
Start: 2020-10-20 | End: 2020-10-24 | Stop reason: HOSPADM

## 2020-10-20 RX ORDER — LEVOTHYROXINE SODIUM 112 UG/1
112 TABLET ORAL DAILY
Status: DISCONTINUED | OUTPATIENT
Start: 2020-10-20 | End: 2020-10-24 | Stop reason: HOSPADM

## 2020-10-20 RX ORDER — HALOPERIDOL 5 MG/ML
2 INJECTION INTRAMUSCULAR EVERY 6 HOURS PRN
Status: DISCONTINUED | OUTPATIENT
Start: 2020-10-20 | End: 2020-10-21

## 2020-10-20 RX ADMIN — LORAZEPAM 2 MG: 2 INJECTION INTRAMUSCULAR; INTRAVENOUS at 21:03

## 2020-10-20 RX ADMIN — LORAZEPAM 2 MG: 2 INJECTION INTRAMUSCULAR; INTRAVENOUS at 01:01

## 2020-10-20 RX ADMIN — OLANZAPINE 5 MG: 10 INJECTION, POWDER, FOR SOLUTION INTRAMUSCULAR at 14:30

## 2020-10-20 RX ADMIN — Medication 10 ML: at 21:05

## 2020-10-20 RX ADMIN — POTASSIUM CHLORIDE 40 MEQ: 1500 TABLET, EXTENDED RELEASE ORAL at 11:55

## 2020-10-20 RX ADMIN — SODIUM BICARBONATE: 84 INJECTION, SOLUTION INTRAVENOUS at 11:50

## 2020-10-20 RX ADMIN — WATER: 1 INJECTION INTRAMUSCULAR; INTRAVENOUS; SUBCUTANEOUS at 18:14

## 2020-10-20 RX ADMIN — Medication 1 G: at 06:08

## 2020-10-20 RX ADMIN — Medication 10 ML: at 09:09

## 2020-10-20 RX ADMIN — LORAZEPAM 2 MG: 2 INJECTION INTRAMUSCULAR; INTRAVENOUS at 11:50

## 2020-10-20 RX ADMIN — ZIPRASIDONE MESYLATE 20 MG: 20 INJECTION, POWDER, LYOPHILIZED, FOR SOLUTION INTRAMUSCULAR at 17:24

## 2020-10-20 ASSESSMENT — PAIN SCALES - GENERAL: PAINLEVEL_OUTOF10: 0

## 2020-10-20 NOTE — CONSULTS
pain  · Neurological: negative for - confusion, dizziness, headaches   · Psychiatric: No anxiety, no depression. · Dermatological: negative for - rash    Physical Examination:  Vitals:    10/20/20 0715   BP: 124/62   Pulse: 64   Resp: 14   Temp: 98.1 °F (36.7 °C)   SpO2: 94%      No intake/output data recorded. Wt Readings from Last 3 Encounters:   10/19/20 155 lb (70.3 kg)   20 155 lb (70.3 kg)   19 150 lb 12.8 oz (68.4 kg)     Temp  Av.1 °F (36.7 °C)  Min: 98 °F (36.7 °C)  Max: 98.1 °F (36.7 °C)  Pulse  Av.5  Min: 61  Max: 78  BP  Min: 105/55  Max: 157/74  SpO2  Av.6 %  Min: 90 %  Max: 97 %  No intake or output data in the 24 hours ending 10/20/20 1150    · Telemetry: Sinus rhythm   Due to the current efforts to prevent transmission of COVID-19 and also the need to preserve PPE for other caregivers, a face-to-face encounter with the patient was not performed. That being said, all relevant records and diagnostic tests were reviewed, including laboratory results and imaging. Please reference any relevant documentation elsewhere. Care will be coordinated with the primary service. ·     Labs, diagnostic and imaging results reviewed. Reviewed.    Recent Labs     10/19/20  1415 10/19/20  2034 10/20/20  0541    142 142   K 4.9 3.8 3.0*  3.0*    111* 115*   CO2 17* 16* 17*   PHOS  --   --  2.0*   BUN 12 14 11   CREATININE 0.6 0.7 <0.5*     Recent Labs     10/19/20  1510 10/19/20  2034 10/20/20  0541   WBC 6.5 7.4 4.8   HGB 14.7 14.3 12.1   HCT 42.9 42.2 36.2   MCV 95.9 98.7 97.3    160 133*     Lab Results   Component Value Date    TROPONINI <0.01 10/19/2020     No results found for: BNP  Lab Results   Component Value Date    PROTIME 11.5 10/20/2020    PROTIME 11.1 10/19/2020    PROTIME 11.3 10/19/2020    INR 0.99 10/20/2020    INR 0.96 10/19/2020    INR 0.97 10/19/2020     Lab Results   Component Value Date    CHOL 162 2020    HDL 40 2020    TRIG 204

## 2020-10-20 NOTE — PROGRESS NOTES
Pt has removed multiple IVs. Pt agitated and not leaving IV or lines in. Pagebruno CASTELLANO to get something IM to help her calm down to gain IV access again.

## 2020-10-20 NOTE — ED NOTES
Patient with multiple attempts to get out of bed, perfect serve message sent to hospitalist for something to sleep     Shefali Lopez, RN  10/20/20 [id number]

## 2020-10-20 NOTE — ED NOTES
Patient impulsive attempted to get out of bed, escorted to bathroom without difficulty, back to bed with side rails up x2, wheels locked in lowest position with call light in reach and bed alarm in place     [facility], RN  10/19/20 [id number]

## 2020-10-20 NOTE — PROGRESS NOTES
grossly non-focal.  Psychiatric: Alert but lethargic    Labs:   Recent Labs     10/19/20  1510 10/19/20  2034 10/20/20  0541   WBC 6.5 7.4 4.8   HGB 14.7 14.3 12.1   HCT 42.9 42.2 36.2    160 133*     Recent Labs     10/19/20  1415 10/19/20  2034 10/20/20  0541    142 142   K 4.9 3.8 3.0*  3.0*    111* 115*   CO2 17* 16* 17*   BUN 12 14 11   CREATININE 0.6 0.7 <0.5*   CALCIUM 9.3 8.5 7.0*   PHOS  --   --  2.0*     Recent Labs     10/19/20  1415 10/19/20  2034 10/20/20  0541   AST 35 28 17   ALT 22 21 16   BILITOT 0.8 0.6 0.7   ALKPHOS 59 57 44     Recent Labs     10/19/20  1415 10/19/20  2034 10/20/20  0541   INR 0.97 0.96 0.99     Recent Labs     10/19/20  1415   TROPONINI <0.01       Urinalysis:      Lab Results   Component Value Date    NITRU POSITIVE 10/19/2020    WBCUA 14 10/19/2020    BACTERIA 4+ 10/19/2020    RBCUA 3 10/19/2020    BLOODU Negative 10/19/2020    SPECGRAV 1.022 10/19/2020    GLUCOSEU Negative 10/19/2020       Radiology:  CT LUMBAR SPINE WO CONTRAST   Final Result   No acute fracture or subluxation of the lumbar spine. Multilevel spondylosis of the lumbar spine, including moderate central   stenosis at L3-4 and L4-5. CT Cervical Spine WO Contrast   Final Result   No acute fracture or subluxation. Spondylosis is noted at C5-6 and C6-7. CT Head WO Contrast   Final Result   No acute intracranial abnormality. Atrophy and white matter changes consistent with chronic small vessel   ischemic disease. XR CHEST PORTABLE   Final Result   Findings as above which may be related to congestive heart failure and mild   edema. Viral infection could also have similar appearance. Assessment/Plan:    Active Hospital Problems    Diagnosis    UTI (urinary tract infection) [N39.0]     UTI  -Continue rocephin  -FU Urine cultures     Fall/syncope-CT head with no acute findings. CT C spine shows Multilevel spondylosis of the lumbar spine, including moderate central stenosis at L3-4 and L4-5.   -Telemetry monitoring  -FU Echo  -Continue IVF  -FU Neurology consultation  -FU Cardiology consultation  -PT OT    Agitation  -PRN zyprexa    Hypokalemia  -replaced potassium    Metabolic acidosis-due to lactic acidosi  -start bicarbonate drip  -trend lactic acid    Elevated D-dimer  -Check CTA chest to r/o PE    History of hypothyroidism as per chart  Patient states does not take any medications  -TSH high and free T4 low,start synthroid    DVT Prophylaxis: Lovenox  Diet: DIET GENERAL;  Code Status: Full Code    PT/OT Eval Status: Ordered    [facility]

## 2020-10-21 PROBLEM — G93.49 ENCEPHALOPATHY DUE TO INFECTION: Status: ACTIVE | Noted: 2020-10-21

## 2020-10-21 PROBLEM — B99.9 ENCEPHALOPATHY DUE TO INFECTION: Status: ACTIVE | Noted: 2020-10-21

## 2020-10-21 LAB
A/G RATIO: 1.1 (ref 1.1–2.2)
ALBUMIN SERPL-MCNC: 4 G/DL (ref 3.4–5)
ALP BLD-CCNC: 74 U/L (ref 40–129)
ALT SERPL-CCNC: 25 U/L (ref 10–40)
ANION GAP SERPL CALCULATED.3IONS-SCNC: 12 MMOL/L (ref 3–16)
APTT: 27.8 SEC (ref 24.2–36.2)
AST SERPL-CCNC: 41 U/L (ref 15–37)
BASOPHILS ABSOLUTE: 0 K/UL (ref 0–0.2)
BASOPHILS RELATIVE PERCENT: 0.7 %
BILIRUB SERPL-MCNC: 0.5 MG/DL (ref 0–1)
BUN BLDV-MCNC: 13 MG/DL (ref 7–20)
CALCIUM SERPL-MCNC: 10 MG/DL (ref 8.3–10.6)
CHLORIDE BLD-SCNC: 107 MMOL/L (ref 99–110)
CO2: 26 MMOL/L (ref 21–32)
CREAT SERPL-MCNC: 0.7 MG/DL (ref 0.6–1.2)
D DIMER: 248 NG/ML DDU (ref 0–229)
EOSINOPHILS ABSOLUTE: 0.1 K/UL (ref 0–0.6)
EOSINOPHILS RELATIVE PERCENT: 1.3 %
FIBRINOGEN: 412 MG/DL (ref 200–397)
GFR AFRICAN AMERICAN: >60
GFR NON-AFRICAN AMERICAN: >60
GLOBULIN: 3.5 G/DL
GLUCOSE BLD-MCNC: 118 MG/DL (ref 70–99)
HCT VFR BLD CALC: 45.6 % (ref 36–48)
HEMOGLOBIN: 15.4 G/DL (ref 12–16)
INR BLD: 0.9 (ref 0.86–1.14)
LV EF: 63 %
LVEF MODALITY: NORMAL
LYMPHOCYTES ABSOLUTE: 2.1 K/UL (ref 1–5.1)
LYMPHOCYTES RELATIVE PERCENT: 37.7 %
MAGNESIUM: 2.4 MG/DL (ref 1.8–2.4)
MCH RBC QN AUTO: 32.7 PG (ref 26–34)
MCHC RBC AUTO-ENTMCNC: 33.8 G/DL (ref 31–36)
MCV RBC AUTO: 96.9 FL (ref 80–100)
MONOCYTES ABSOLUTE: 0.4 K/UL (ref 0–1.3)
MONOCYTES RELATIVE PERCENT: 7.1 %
NEUTROPHILS ABSOLUTE: 3 K/UL (ref 1.7–7.7)
NEUTROPHILS RELATIVE PERCENT: 53.2 %
ORGANISM: ABNORMAL
PDW BLD-RTO: 13.6 % (ref 12.4–15.4)
PHOSPHORUS: 3.2 MG/DL (ref 2.5–4.9)
PLATELET # BLD: 146 K/UL (ref 135–450)
PMV BLD AUTO: 8.8 FL (ref 5–10.5)
POTASSIUM REFLEX MAGNESIUM: 3 MMOL/L (ref 3.5–5.1)
POTASSIUM SERPL-SCNC: 3 MMOL/L (ref 3.5–5.1)
PROTHROMBIN TIME: 10.4 SEC (ref 10–13.2)
RBC # BLD: 4.71 M/UL (ref 4–5.2)
SODIUM BLD-SCNC: 145 MMOL/L (ref 136–145)
TOTAL PROTEIN: 7.5 G/DL (ref 6.4–8.2)
URINE CULTURE, ROUTINE: ABNORMAL
WBC # BLD: 5.7 K/UL (ref 4–11)

## 2020-10-21 PROCEDURE — 85384 FIBRINOGEN ACTIVITY: CPT

## 2020-10-21 PROCEDURE — 85025 COMPLETE CBC W/AUTO DIFF WBC: CPT

## 2020-10-21 PROCEDURE — 6360000002 HC RX W HCPCS: Performed by: INTERNAL MEDICINE

## 2020-10-21 PROCEDURE — 80053 COMPREHEN METABOLIC PANEL: CPT

## 2020-10-21 PROCEDURE — 99233 SBSQ HOSP IP/OBS HIGH 50: CPT | Performed by: NURSE PRACTITIONER

## 2020-10-21 PROCEDURE — 2580000003 HC RX 258: Performed by: INTERNAL MEDICINE

## 2020-10-21 PROCEDURE — 85730 THROMBOPLASTIN TIME PARTIAL: CPT

## 2020-10-21 PROCEDURE — 93306 TTE W/DOPPLER COMPLETE: CPT

## 2020-10-21 PROCEDURE — 85610 PROTHROMBIN TIME: CPT

## 2020-10-21 PROCEDURE — 99223 1ST HOSP IP/OBS HIGH 75: CPT | Performed by: PSYCHIATRY & NEUROLOGY

## 2020-10-21 PROCEDURE — 2500000003 HC RX 250 WO HCPCS: Performed by: INTERNAL MEDICINE

## 2020-10-21 PROCEDURE — 87040 BLOOD CULTURE FOR BACTERIA: CPT

## 2020-10-21 PROCEDURE — 83735 ASSAY OF MAGNESIUM: CPT

## 2020-10-21 PROCEDURE — 85379 FIBRIN DEGRADATION QUANT: CPT

## 2020-10-21 PROCEDURE — 2060000000 HC ICU INTERMEDIATE R&B

## 2020-10-21 PROCEDURE — 84100 ASSAY OF PHOSPHORUS: CPT

## 2020-10-21 PROCEDURE — 36415 COLL VENOUS BLD VENIPUNCTURE: CPT

## 2020-10-21 RX ORDER — HALOPERIDOL 5 MG/ML
5 INJECTION INTRAMUSCULAR EVERY 6 HOURS PRN
Status: DISCONTINUED | OUTPATIENT
Start: 2020-10-21 | End: 2020-10-24 | Stop reason: HOSPADM

## 2020-10-21 RX ORDER — ZIPRASIDONE MESYLATE 20 MG/ML
INJECTION, POWDER, LYOPHILIZED, FOR SOLUTION INTRAMUSCULAR
Status: DISPENSED
Start: 2020-10-21 | End: 2020-10-22

## 2020-10-21 RX ORDER — SULFAMETHOXAZOLE AND TRIMETHOPRIM 800; 160 MG/1; MG/1
1 TABLET ORAL 2 TIMES DAILY
Qty: 6 TABLET | Refills: 0 | Status: SHIPPED | OUTPATIENT
Start: 2020-10-21 | End: 2020-10-23 | Stop reason: HOSPADM

## 2020-10-21 RX ORDER — ZIPRASIDONE MESYLATE 20 MG/ML
20 INJECTION, POWDER, LYOPHILIZED, FOR SOLUTION INTRAMUSCULAR ONCE
Status: COMPLETED | OUTPATIENT
Start: 2020-10-21 | End: 2020-10-21

## 2020-10-21 RX ORDER — LEVOTHYROXINE SODIUM 112 UG/1
112 TABLET ORAL DAILY
Qty: 30 TABLET | Refills: 0 | Status: SHIPPED | OUTPATIENT
Start: 2020-10-22 | End: 2020-10-24

## 2020-10-21 RX ORDER — QUETIAPINE FUMARATE 25 MG/1
25 TABLET, FILM COATED ORAL 2 TIMES DAILY PRN
Status: DISCONTINUED | OUTPATIENT
Start: 2020-10-21 | End: 2020-10-24 | Stop reason: HOSPADM

## 2020-10-21 RX ORDER — HALOPERIDOL 5 MG/ML
3 INJECTION INTRAMUSCULAR ONCE
Status: COMPLETED | OUTPATIENT
Start: 2020-10-21 | End: 2020-10-21

## 2020-10-21 RX ADMIN — HALOPERIDOL LACTATE 2 MG: 5 INJECTION, SOLUTION INTRAMUSCULAR at 11:44

## 2020-10-21 RX ADMIN — Medication 10 ML: at 10:58

## 2020-10-21 RX ADMIN — SODIUM BICARBONATE: 84 INJECTION, SOLUTION INTRAVENOUS at 05:34

## 2020-10-21 RX ADMIN — LORAZEPAM 2 MG: 2 INJECTION INTRAMUSCULAR; INTRAVENOUS at 11:25

## 2020-10-21 RX ADMIN — ZIPRASIDONE MESYLATE 20 MG: 20 INJECTION, POWDER, LYOPHILIZED, FOR SOLUTION INTRAMUSCULAR at 13:32

## 2020-10-21 RX ADMIN — Medication 10 ML: at 08:00

## 2020-10-21 RX ADMIN — ENOXAPARIN SODIUM 40 MG: 40 INJECTION SUBCUTANEOUS at 22:39

## 2020-10-21 RX ADMIN — HALOPERIDOL LACTATE 5 MG: 5 INJECTION, SOLUTION INTRAMUSCULAR at 20:23

## 2020-10-21 RX ADMIN — Medication 1 G: at 08:00

## 2020-10-21 RX ADMIN — HALOPERIDOL LACTATE 3 MG: 5 INJECTION INTRAMUSCULAR at 12:22

## 2020-10-21 ASSESSMENT — PAIN SCALES - GENERAL
PAINLEVEL_OUTOF10: 0

## 2020-10-21 NOTE — PROGRESS NOTES
Initiated restraints d/t pt becoming increasingly agitated and trying to bite and hit. She is also continuously trying to get out of bed. MD saw pt. 5mg Haldol given as well as 2mg ativan. Pt rambling and can not hold conversation. Will continue to monitor.

## 2020-10-21 NOTE — CONSULTS
NEUROLOGY CONSULTATION     Patient's Name :   Calvin Pichardo        YOB: 1945                    Date of Consultation : 10/21/2020 2:10 PM    Referring Physician :  Edna Alvarado MD    Reason for Consultation :  Mental status changes    HISTORY OF PRESENT ILLNESS      Calvin Pichardo is a 66 y.o. female   History was obtained from patientbut predominantly from dictations in the chart. Patient has been sedated with Haldol and Ativan because of agitation and confusion. Patient apparently was brought to the hospital because of a fall. Patient was initially cognitively intact but apparently later became confused. She became agitated. She had to be sedated. Now she is somewhat drowsy but is arousable. She is still confused. Urinary tract infection was found by urine analysis. She is now on antibiotics. Patient denies any headache. She is not sure why she is in the hospital.  She denies any focal weakness numbness vertigo or diplopia. No further history is available at this time. REVIEW OF SYSTEMS     Denies any chest pain palpitation breathlessness or fever. Rest of the 14 system review was reviewed as much as possible and was negative except for the symptoms noted above. However because of patient's confusion this history may not be very reliable.     Past Medical History:   Diagnosis Date    Anxiety     Hypothyroidism     Irritable bowel syndrome     Prediabetes      Family History   Problem Relation Age of Onset    Thyroid Disease Sister     No Known Problems Sister     No Known Problems Son     No Known Problems Son     No Known Problems Daughter      Social History     Socioeconomic History    Marital status: Single     Spouse name: None    Number of children: None    Years of education: None    Highest education level: None   Occupational History    None   Social Needs    Financial resource strain: None   [place] insecurity     Worry: None     Inability: None    Transportation needs     Medical: None     Non-medical: None   Tobacco Use    Smoking status: Never Smoker    Smokeless tobacco: Never Used   Substance and Sexual Activity    Alcohol use: No    Drug use: None    Sexual activity: None   Lifestyle    Physical activity     Days per week: None     Minutes per session: None    Stress: None   Relationships    Social connections     Talks on phone: None     Gets together: None     Attends [employer] service: None     Active member of club or organization: None     Attends meetings of clubs or organizations: None     Relationship status: None    Intimate partner violence     Fear of current or ex partner: None     Emotionally abused: None     Physically abused: None     Forced sexual activity: None   Other Topics Concern    None   Social History Narrative    None     Current Facility-Administered Medications   Medication Dose Route Frequency Provider Last Rate Last Dose    haloperidol lactate (HALDOL) injection 5 mg  5 mg Intramuscular Q6H PRN Nikolay Dong MD        QUEtiapine (SEROQUEL) tablet 25 mg  25 mg Oral BID PRN Nikolay Dong MD        ziprasidone (GEODON) 20 MG injection             sterile water injection             potassium chloride (KLOR-CON M) extended release tablet 40 mEq  40 mEq Oral PRN Nikolay Dong MD        Or    potassium bicarb-citric acid (EFFER-K) effervescent tablet 40 mEq  40 mEq Oral PRN Nikolay Dong MD        Or    potassium chloride 10 mEq/100 mL IVPB (Peripheral Line)  10 mEq Intravenous PRN Nikolay Dong MD        sodium bicarbonate 150 mEq in dextrose 5 % 1,000 mL infusion   Intravenous Continuous Usama Rios MD 75 mL/hr at 10/21/20 0534      levothyroxine (SYNTHROID) tablet 112 mcg  112 mcg Oral Daily Nikolay Dong MD   Stopped at 10/21/20 1058    sodium chloride flush 0.9 % injection 10 mL  10 mL Intravenous 2 times per day Deniz Grove MD 10 mL at 10/21/20 1058    sodium chloride flush 0.9 % injection 10 mL  10 mL Intravenous PRN Steven Germain MD        sodium chloride flush 0.9 % injection 10 mL  10 mL Intravenous 2 times per day [facility] MD KALA   10 mL at 10/21/20 0800    sodium chloride flush 0.9 % injection 10 mL  10 mL Intravenous PRN Steven Germain MD        acetaminophen (TYLENOL) tablet 650 mg  650 mg Oral Q6H PRN Steven Germain MD        Or    acetaminophen (TYLENOL) suppository 650 mg  650 mg Rectal Q6H PRN Steven Germain MD        polyethylene glycol (GLYCOLAX) packet 17 g  17 g Oral Daily PRN Steven Germain MD        promethazine (PHENERGAN) tablet 12.5 mg  12.5 mg Oral Q6H PRN Steven ARMENDARIZ MD        Or    ondansetron (ZOFRAN) injection 4 mg  4 mg Intravenous Q6H PRN Steven ARMENDARIZ MD        enoxaparin (LOVENOX) injection 40 mg  40 mg Subcutaneous Nightly Steven ARMENDARIZ MD   40 mg at 10/19/20 2121    perflutren lipid microspheres (DEFINITY) injection 1.65 mg  1.5 mL Intravenous ONCE PRN Steven Germain MD        cefTRIAXone (ROCEPHIN) 1 g in sterile water 10 mL IV syringe  1 g Intravenous Q24H Steven ARMENDARIZ MD   1 g at 10/21/20 0800    acetaminophen (TYLENOL) tablet 650 mg  650 mg Oral Q6H PRN Steven Germain MD        Or    acetaminophen (TYLENOL) suppository 650 mg  650 mg Rectal Q6H PRN Steven Germain MD         Current Outpatient Medications   Medication Sig Dispense Refill    sulfamethoxazole-trimethoprim (BACTRIM DS;SEPTRA DS) 800-160 MG per tablet Take 1 tablet by mouth 2 times daily for 3 days 6 tablet 0    [START ON 10/22/2020] levothyroxine (SYNTHROID) 112 MCG tablet Take 1 tablet by mouth Daily 30 tablet 0     No Known Allergies    PHYSICAL EXAMINATION:  BP (!) 141/67   Pulse 120   Temp 97.4 °F (36.3 °C) (Oral)   Resp 20   Ht 5' 6\" (1.676 m)   Wt 155 lb (70.3 kg)   SpO2 91%   BMI 25.02 kg/m²   Appearance: Well appearing, well nourished and in no distress  Mental Status Exam: Patient is drowsy but is arousable. When aroused she becomes a little agitated. She is oriented only x1. However she was able to tell me her date of birth her name and age correctly. She was unable to cooperate for detailed mental status exam.  She could not cooperate for memory testing. Judgment seemed impaired. Speech was slightly dysarthric but patient had received a sedation including Haldol and Ativan. There was no aphasia. Funduscopic Exam: Could not be performed due to the confusion and poor cooperation  Cranial Nerves:   II: Visual fields: Could not be tested  III: Pupils: Equal round reacting to light  III,IV,VI: Extraocular movements seem intact  V: Facial sensation: Could not be tested  VII: Face appears symmetrical  VIII: Hearing: Normal for conversation  IX: Gag reflex is present  XI: Shoulder strength could not be tested  XII: Tongue is in the midline  Motor: Moves all 4 extremities spontaneously quite well. Tone is normal.  Strength is at least 4/5. Sensory: Withdraws to painful stimuli in arms and legs  Coordination:     Could not cooperate for testing          Reflexes: 1 in the upper extremities and knees and absent in the ankles  Plantar response:  Withdrawal response bilaterally  Gait: Unable to ambulate at this time  Romberg: Could not be tested  Vascular: No carotid bruit bilaterally        DATA:  LABS:  General Labs:    CBC:   Lab Results   Component Value Date    WBC 4.8 10/20/2020    RBC 3.72 10/20/2020    HGB 12.1 10/20/2020    HCT 36.2 10/20/2020    MCV 97.3 10/20/2020    MCH 32.7 10/20/2020    MCHC 33.6 10/20/2020    RDW 13.4 10/20/2020     10/20/2020    MPV 8.8 10/20/2020     BMP:    Lab Results   Component Value Date     10/20/2020    K 3.0 10/20/2020    K 3.0 10/20/2020     10/20/2020    CO2 17 10/20/2020    BUN 11 10/20/2020    LABALBU 3.1 10/20/2020    CREATININE <0.5 10/20/2020    CALCIUM 7.0 10/20/2020    GFRAA >60 10/20/2020    LABGLOM >60 10/20/2020 GLUCOSE 96 10/20/2020     RADIOLOGY REVIEW:  I have reviewed radiology image(s) and reports(s) of: CT scan of the head    IMPRESSION :  Acute metabolic encephalopathy probably due to urinary tract infection  Viral encephalitis was considered in the differential diagnosis but felt to be less likely since patient clearly states that there is no headache at all. CT head did not show any acute changes  Significant hypothyroidism with TSH of 12.81  Patient Active Problem List   Diagnosis    Hypothyroidism (acquired)    Prediabetes    Anxiety    Postmenopausal    Essential hypertension    Bacterial urinary tract infection    Fall    Hypokalemia    UTI (urinary tract infection)     RECOMMENDATIONS ;  Discussed with patient  Continue IV antibiotics  Check EEG  Slowly correct hypothyroidism  I will follow her with you. If there is no improvement, she may need MRI brain as well as lumbar puncture. Thank you for this consultation. Please note a portion of this chart was generated using dragon dictation software. Although every effort was made to ensure the accuracy of this automated transcription, some errors in transcription may have occurred.

## 2020-10-21 NOTE — PROGRESS NOTES
Hospitalist Progress Note      PCP: Fe Zarco MD    Date of Admission: 10/19/2020    [address] who presented with multiple falls. She is noted to have UTI. She is admitted for syncope workup. Subjective:   Patient was agitated and removed multiple IVs yesterday. Patient is refusing blood work this morning. She is agitated at bedside. She is trying to get out bed. She pulled my hand and tried to bite me    Medications:  Reviewed    Infusion Medications    sodium bicarbonate infusion 75 mL/hr at 10/21/20 0534     Scheduled Medications    levothyroxine  112 mcg Oral Daily    sodium chloride flush  10 mL Intravenous 2 times per day    sodium chloride flush  10 mL Intravenous 2 times per day    enoxaparin  40 mg Subcutaneous Nightly    cefTRIAXone (ROCEPHIN) IV  1 g Intravenous Q24H     PRN Meds: LORazepam, potassium chloride **OR** potassium alternative oral replacement **OR** potassium chloride, haloperidol lactate, sodium chloride flush, sodium chloride flush, acetaminophen **OR** acetaminophen, polyethylene glycol, promethazine **OR** ondansetron, perflutren lipid microspheres, acetaminophen **OR** acetaminophen      Intake/Output Summary (Last 24 hours) at 10/21/2020 1207  Last data filed at 10/21/2020 0800  Gross per 24 hour   Intake 20 ml   Output --   Net 20 ml       Physical Exam Performed:    BP (!) 141/67   Pulse 120   Temp 97.4 °F (36.3 °C) (Oral)   Resp 20   Ht 5' 6\" (1.676 m)   Wt 155 lb (70.3 kg)   SpO2 91%   BMI 25.02 kg/m²     General appearance: No apparent distress, appears stated age and cooperative. Elderly female  HEENT: Pupils equal, round, and reactive to light. Conjunctivae/corneas clear. Neck: Supple, with full range of motion. No jugular venous distention. Trachea midline. Respiratory:  Normal respiratory effort. Clear to auscultation, bilaterally without Rales/Wheezes/Rhonchi.   Cardiovascular: Regular rate and rhythm with normal S1/S2 without murmurs, rubs or gallops. Abdomen: Soft, non-tender, non-distended with normal bowel sounds. Musculoskeletal: No clubbing, cyanosis or edema bilaterally. Full range of motion without deformity. Skin: Skin color, texture, turgor normal.  No rashes or lesions. Neurologic:  Neurovascularly intact without any focal sensory/motor deficits. Cranial nerves: II-XII intact, grossly non-focal.  Psychiatric: Alert and agitated. Confused. Labs:   Recent Labs     10/19/20  1510 10/19/20  2034 10/20/20  0541   WBC 6.5 7.4 4.8   HGB 14.7 14.3 12.1   HCT 42.9 42.2 36.2    160 133*     Recent Labs     10/19/20  1415 10/19/20  2034 10/20/20  0541    142 142   K 4.9 3.8 3.0*  3.0*    111* 115*   CO2 17* 16* 17*   BUN 12 14 11   CREATININE 0.6 0.7 <0.5*   CALCIUM 9.3 8.5 7.0*   PHOS  --   --  2.0*     Recent Labs     10/19/20  1415 10/19/20  2034 10/20/20  0541   AST 35 28 17   ALT 22 21 16   BILITOT 0.8 0.6 0.7   ALKPHOS 59 57 44     Recent Labs     10/19/20  1415 10/19/20  2034 10/20/20  0541   INR 0.97 0.96 0.99     Recent Labs     10/19/20  1415   TROPONINI <0.01       Urinalysis:      Lab Results   Component Value Date    NITRU POSITIVE 10/19/2020    WBCUA 14 10/19/2020    BACTERIA 4+ 10/19/2020    RBCUA 3 10/19/2020    BLOODU Negative 10/19/2020    SPECGRAV 1.022 10/19/2020    GLUCOSEU Negative 10/19/2020       Radiology:  CT LUMBAR SPINE WO CONTRAST   Final Result   No acute fracture or subluxation of the lumbar spine. Multilevel spondylosis of the lumbar spine, including moderate central   stenosis at L3-4 and L4-5. CT Cervical Spine WO Contrast   Final Result   No acute fracture or subluxation. Spondylosis is noted at C5-6 and C6-7. CT Head WO Contrast   Final Result   No acute intracranial abnormality. Atrophy and white matter changes consistent with chronic small vessel   ischemic disease.          XR CHEST PORTABLE   Final Result   Findings as above which may be related to congestive heart failure and mild   edema. Viral infection could also have similar appearance. CTA CHEST W CONTRAST    (Results Pending)           Assessment/Plan:    Active Hospital Problems    Diagnosis    UTI (urinary tract infection) [N39.0]    Bacterial urinary tract infection [N39.0, A49.9]    Hypothyroidism (acquired) [E03.9]     Klebsiella UTI  -Continue rocephin [day 2/5]. Discharged on Bactrim DS for remainder of the course.     Fall/syncope-CT head with no acute findings. CT C spine shows Multilevel spondylosis of the lumbar spine, including moderate central stenosis at L3-4 and L4-5.   -Telemetry monitoring  -FU Echo  -Continue IVF  -FU Neurology consultation  -Appreciate EP evaluation. No further work-up. -PT OT    Agitation/Encephalopathy-likely due to infection and environment  -PRN haldol  -Restraints  -telemetry    Hypokalemia  -replaced potassium    Metabolic acidosis-due to lactic acidosi  -Continue bicarbonate drip  -trend lactic acid    Elevated D-dimer  -Follow-up CTA chest to r/o PE    History of hypothyroidism as per chart  Patient states does not take any medications  -TSH high and free T4 low, continue synthroid    DVT Prophylaxis: Lovenox  Diet: DIET GENERAL;  Code Status: Full Code    PT/OT Eval Status: Ordered    Dispo - Home when agitation and confusion resolves.   Maryann Jenkins MD

## 2020-10-21 NOTE — PROGRESS NOTES
Requested MD to clarify ativan order and notified him of request to speak to him and refusing blood work.

## 2020-10-21 NOTE — PROGRESS NOTES
[address]   Electrophysiology Progress Note   Date: 10/21/2020  Admit Date: 10/19/2020     Reason for follow up: Fall    Chief Complaint:   Chief Complaint   Patient presents with   Hussein Aguila Fall     Arrived by EMS rt fall sustained at home. Denies hitting her head or blood thinners. Landed on her bottom; no pain reported. History of Present Illness: History obtained from patient and medical record. Rishi Boss is a 66 y.o. female with no significant cardiac medical history who presented with fall. She was walking and then fell down without losing consciousness. She was found to have UTI and negative for Covid-19. Interval Hx: Today, she is being seen for follow up. She has become confused overnight and neurology has been consulted. She is confused and offers little insight. Patient seen and examined. Clinical notes reviewed. Telemetry reviewed. Allergies:  No Known Allergies    Home Meds:  Prior to Visit Medications    Medication Sig Taking?  Authorizing Provider   sulfamethoxazole-trimethoprim (BACTRIM DS;SEPTRA DS) 800-160 MG per tablet Take 1 tablet by mouth 2 times daily for 3 days Yes Michelle Justice MD   levothyroxine (SYNTHROID) 112 MCG tablet Take 1 tablet by mouth Daily Yes Michelle Justice MD      Scheduled Meds:   ziprasidone        sterile water        levothyroxine  112 mcg Oral Daily    sodium chloride flush  10 mL Intravenous 2 times per day    sodium chloride flush  10 mL Intravenous 2 times per day    enoxaparin  40 mg Subcutaneous Nightly    cefTRIAXone (ROCEPHIN) IV  1 g Intravenous Q24H     Continuous Infusions:   sodium bicarbonate infusion 75 mL/hr at 10/21/20 0534     PRN Meds:haloperidol lactate, QUEtiapine, potassium chloride **OR** potassium alternative oral replacement **OR** potassium chloride, sodium chloride flush, sodium chloride flush, acetaminophen **OR** acetaminophen, polyethylene glycol, promethazine **OR** ondansetron, perflutren lipid strength 5/5 with full ROM  · Neurologic/Psych: Acutely confused, mildly agitated, in soft limb restraints    Pertinent labs, diagnostic, device, and imaging results reviewed as a part of this visit    Labs:    BMP:   Recent Labs     10/19/20  2034 10/20/20  0541 10/21/20  1530    142 145   K 3.8 3.0*  3.0* 3.0*   * 115* 107   CO2 16* 17* 26   PHOS  --  2.0* 3.2   BUN 14 11 13   CREATININE 0.7 <0.5* 0.7   MG  --  1.90 2.40     Estimated Creatinine Clearance: 63 mL/min (based on SCr of 0.7 mg/dL). CBC:   Recent Labs     10/19/20  2034 10/20/20  0541 10/21/20  1530   WBC 7.4 4.8 5.7   HGB 14.3 12.1 15.4   HCT 42.2 36.2 45.6   MCV 98.7 97.3 96.9    133* 146     Thyroid:   Lab Results   Component Value Date    TSH 4.73 01/31/2020     Lipids:   Lab Results   Component Value Date    CHOL 162 01/31/2020    HDL 40 01/31/2020    TRIG 204 01/31/2020     LFTS:   Lab Results   Component Value Date    ALT 25 10/21/2020    AST 41 10/21/2020    ALKPHOS 74 10/21/2020    PROT 7.5 10/21/2020    AGRATIO 1.1 10/21/2020    BILITOT 0.5 10/21/2020     Cardiac Enzymes:   Lab Results   Component Value Date    TROPONINI <0.01 10/19/2020     Coags:   Lab Results   Component Value Date    PROTIME 10.4 10/21/2020    INR 0.90 10/21/2020     ECG: 10/19/2020: SR    ECHO: 10/21/2020   Summary   -Left ventricular systolic function is normal with ejection fraction   estimated at 60-65 %.   -No regional wall motion abnormalities are noted. -There is mild concentric left ventricular hypertrophy.   -Mild mitral regurgitation. Grade I diastolic dysfunction with normal LV filling pressures.     Problem List:   Patient Active Problem List    Diagnosis Date Noted    Encephalopathy due to infection 10/21/2020    UTI (urinary tract infection) 10/20/2020    Fall     Hypokalemia     Bacterial urinary tract infection 10/19/2020    Essential hypertension 04/20/2018    Postmenopausal 11/13/2017    Hypothyroidism (acquired)     Prediabetes     Anxiety       Assessment and Plan:  Syncope   - Reports she did not have LOC, rather lost her balance and fell   - Monitor has been unremarkable and she has remained in SR without ectopy   - Denies CP. SOB, or palpitations   - Echo was unremarkable  Hypothyroidism   - Need replacement   - Per primary team   Hypokalemia   - 3.0 again today    - Monitor and replace per s/s order  Elevated BP   - No need for aggressive BP management in setting of acute illness  Acute metabolic encephalopathy?   - Following with neurology   - Plans for EEG    - No further cardiac workup necessary, will sign off    All pertinent information and plan of care discussed with the EP physician. Multiple medical conditions with risk of decompensation. All questions and concerns were addressed to the patient. Alternatives to my treatment were discussed. I have discussed the above stated plan with  the nurse. Thank you for allowing to us to participate in the care of Deni Lyons.     VINCE Erickson-CNP  [address]   Office: [phone]

## 2020-10-21 NOTE — CARE COORDINATION
DADA spoke with Arslan Mcadams, [phone], with the [facility] program.  Isabel Bonilla in emergency contacts was not a good number. Pt is still confused in the ED. [facility] unable to complete an assessment at this time. Arslan Mcadams did stated pt was a part of the program in 2018 and noted to have verbally aggressive behavior. Stated pt was never happy with services. Unsure if this was the reason she stopped services or not.     Electronically signed by SOFIA Simmons, JESSICAW on 10/21/2020 at 12:29 PM

## 2020-10-22 ENCOUNTER — APPOINTMENT (OUTPATIENT)
Dept: CT IMAGING | Age: 75
DRG: 871 | End: 2020-10-22
Payer: MEDICARE

## 2020-10-22 LAB
A/G RATIO: 1.1 (ref 1.1–2.2)
ALBUMIN SERPL-MCNC: 3.5 G/DL (ref 3.4–5)
ALP BLD-CCNC: 58 U/L (ref 40–129)
ALT SERPL-CCNC: 23 U/L (ref 10–40)
ANION GAP SERPL CALCULATED.3IONS-SCNC: 10 MMOL/L (ref 3–16)
APTT: 29.4 SEC (ref 24.2–36.2)
AST SERPL-CCNC: 35 U/L (ref 15–37)
BASOPHILS ABSOLUTE: 0 K/UL (ref 0–0.2)
BASOPHILS RELATIVE PERCENT: 0.6 %
BILIRUB SERPL-MCNC: 0.5 MG/DL (ref 0–1)
BUN BLDV-MCNC: 15 MG/DL (ref 7–20)
CALCIUM SERPL-MCNC: 9.2 MG/DL (ref 8.3–10.6)
CHLORIDE BLD-SCNC: 108 MMOL/L (ref 99–110)
CO2: 28 MMOL/L (ref 21–32)
CREAT SERPL-MCNC: 0.6 MG/DL (ref 0.6–1.2)
CULTURE, BLOOD 2: ABNORMAL
CULTURE, BLOOD 2: ABNORMAL
D DIMER: 203 NG/ML DDU (ref 0–229)
EOSINOPHILS ABSOLUTE: 0.1 K/UL (ref 0–0.6)
EOSINOPHILS RELATIVE PERCENT: 1.7 %
FIBRINOGEN: 368 MG/DL (ref 200–397)
GFR AFRICAN AMERICAN: >60
GFR NON-AFRICAN AMERICAN: >60
GLOBULIN: 3.1 G/DL
GLUCOSE BLD-MCNC: 167 MG/DL (ref 70–99)
HCT VFR BLD CALC: 40.1 % (ref 36–48)
HEMOGLOBIN: 13.6 G/DL (ref 12–16)
INR BLD: 1 (ref 0.86–1.14)
LYMPHOCYTES ABSOLUTE: 2 K/UL (ref 1–5.1)
LYMPHOCYTES RELATIVE PERCENT: 39.1 %
MAGNESIUM: 2.2 MG/DL (ref 1.8–2.4)
MCH RBC QN AUTO: 32.6 PG (ref 26–34)
MCHC RBC AUTO-ENTMCNC: 33.9 G/DL (ref 31–36)
MCV RBC AUTO: 96.1 FL (ref 80–100)
MONOCYTES ABSOLUTE: 0.5 K/UL (ref 0–1.3)
MONOCYTES RELATIVE PERCENT: 8.8 %
NEUTROPHILS ABSOLUTE: 2.6 K/UL (ref 1.7–7.7)
NEUTROPHILS RELATIVE PERCENT: 49.8 %
ORGANISM: ABNORMAL
ORGANISM: ABNORMAL
PDW BLD-RTO: 13.9 % (ref 12.4–15.4)
PHOSPHORUS: 4 MG/DL (ref 2.5–4.9)
PLATELET # BLD: 139 K/UL (ref 135–450)
PMV BLD AUTO: 8.8 FL (ref 5–10.5)
POTASSIUM SERPL-SCNC: 3.4 MMOL/L (ref 3.5–5.1)
PROTHROMBIN TIME: 11.6 SEC (ref 10–13.2)
RBC # BLD: 4.18 M/UL (ref 4–5.2)
SODIUM BLD-SCNC: 146 MMOL/L (ref 136–145)
TOTAL PROTEIN: 6.6 G/DL (ref 6.4–8.2)
WBC # BLD: 5.2 K/UL (ref 4–11)

## 2020-10-22 PROCEDURE — 6360000002 HC RX W HCPCS: Performed by: INTERNAL MEDICINE

## 2020-10-22 PROCEDURE — 97165 OT EVAL LOW COMPLEX 30 MIN: CPT

## 2020-10-22 PROCEDURE — 97166 OT EVAL MOD COMPLEX 45 MIN: CPT

## 2020-10-22 PROCEDURE — 2060000000 HC ICU INTERMEDIATE R&B

## 2020-10-22 PROCEDURE — 85025 COMPLETE CBC W/AUTO DIFF WBC: CPT

## 2020-10-22 PROCEDURE — 2500000003 HC RX 250 WO HCPCS: Performed by: INTERNAL MEDICINE

## 2020-10-22 PROCEDURE — 99232 SBSQ HOSP IP/OBS MODERATE 35: CPT | Performed by: PSYCHIATRY & NEUROLOGY

## 2020-10-22 PROCEDURE — 85730 THROMBOPLASTIN TIME PARTIAL: CPT

## 2020-10-22 PROCEDURE — 85610 PROTHROMBIN TIME: CPT

## 2020-10-22 PROCEDURE — 2580000003 HC RX 258: Performed by: INTERNAL MEDICINE

## 2020-10-22 PROCEDURE — 95819 EEG AWAKE AND ASLEEP: CPT

## 2020-10-22 PROCEDURE — 85379 FIBRIN DEGRADATION QUANT: CPT

## 2020-10-22 PROCEDURE — 97530 THERAPEUTIC ACTIVITIES: CPT

## 2020-10-22 PROCEDURE — 85384 FIBRINOGEN ACTIVITY: CPT

## 2020-10-22 PROCEDURE — 72125 CT NECK SPINE W/O DYE: CPT

## 2020-10-22 PROCEDURE — 70450 CT HEAD/BRAIN W/O DYE: CPT

## 2020-10-22 PROCEDURE — 95819 EEG AWAKE AND ASLEEP: CPT | Performed by: PSYCHIATRY & NEUROLOGY

## 2020-10-22 PROCEDURE — 6370000000 HC RX 637 (ALT 250 FOR IP): Performed by: INTERNAL MEDICINE

## 2020-10-22 PROCEDURE — 36415 COLL VENOUS BLD VENIPUNCTURE: CPT

## 2020-10-22 PROCEDURE — 97535 SELF CARE MNGMENT TRAINING: CPT

## 2020-10-22 PROCEDURE — 83735 ASSAY OF MAGNESIUM: CPT

## 2020-10-22 PROCEDURE — 84100 ASSAY OF PHOSPHORUS: CPT

## 2020-10-22 PROCEDURE — 80053 COMPREHEN METABOLIC PANEL: CPT

## 2020-10-22 PROCEDURE — 97162 PT EVAL MOD COMPLEX 30 MIN: CPT

## 2020-10-22 PROCEDURE — 97116 GAIT TRAINING THERAPY: CPT

## 2020-10-22 RX ORDER — POTASSIUM CHLORIDE 20 MEQ/1
20 TABLET, EXTENDED RELEASE ORAL ONCE
Status: COMPLETED | OUTPATIENT
Start: 2020-10-22 | End: 2020-10-22

## 2020-10-22 RX ADMIN — Medication 10 ML: at 20:55

## 2020-10-22 RX ADMIN — Medication 10 ML: at 02:27

## 2020-10-22 RX ADMIN — POTASSIUM CHLORIDE 20 MEQ: 1500 TABLET, EXTENDED RELEASE ORAL at 11:51

## 2020-10-22 RX ADMIN — Medication 1 G: at 07:01

## 2020-10-22 RX ADMIN — ENOXAPARIN SODIUM 40 MG: 40 INJECTION SUBCUTANEOUS at 20:54

## 2020-10-22 RX ADMIN — SODIUM BICARBONATE: 84 INJECTION, SOLUTION INTRAVENOUS at 08:09

## 2020-10-22 RX ADMIN — LEVOTHYROXINE SODIUM 112 MCG: 0.11 TABLET ORAL at 11:50

## 2020-10-22 ASSESSMENT — PAIN SCALES - GENERAL: PAINLEVEL_OUTOF10: 0

## 2020-10-22 NOTE — PROGRESS NOTES
Bedside report received from [facility] on night shift who indicated restraints had been removed at some point in the night. Verified during bedside report that restraints were not on pt at this time. Unclear as to what time they were removed.  Amarilis Arthur RN

## 2020-10-22 NOTE — PROGRESS NOTES
Pt moved to room 62 to be closer to nurses station. Pt calm, cooperative. Asked for food. Gave turkey sandwich and milk.  Pt ate with no issues

## 2020-10-22 NOTE — PROGRESS NOTES
Hospitalist Progress Note      PCP: Zaria Gonzalez MD    Date of Admission: 10/19/2020    [address] who presented with multiple falls. She is noted to have UTI. She is admitted for syncope workup. Subjective:   Patient is afebrile. She fell in bathroom. She has no complaints. She is more lucid. She is out of restraints    Medications:  Reviewed    Infusion Medications     Scheduled Medications    potassium chloride  20 mEq Oral Once    levothyroxine  112 mcg Oral Daily    sodium chloride flush  10 mL Intravenous 2 times per day    sodium chloride flush  10 mL Intravenous 2 times per day    enoxaparin  40 mg Subcutaneous Nightly    cefTRIAXone (ROCEPHIN) IV  1 g Intravenous Q24H     PRN Meds: haloperidol lactate, QUEtiapine, potassium chloride **OR** potassium alternative oral replacement **OR** potassium chloride, sodium chloride flush, sodium chloride flush, acetaminophen **OR** acetaminophen, polyethylene glycol, promethazine **OR** ondansetron, perflutren lipid microspheres, acetaminophen **OR** acetaminophen    No intake or output data in the 24 hours ending 10/22/20 1143    Physical Exam Performed:    BP (!) 147/75   Pulse 62   Temp 97.7 °F (36.5 °C) (Oral)   Resp 18   Ht 5' 6\" (1.676 m)   Wt 155 lb (70.3 kg)   SpO2 97%   BMI 25.02 kg/m²     General appearance: No apparent distress, appears stated age and cooperative. Elderly female  HEENT: Pupils equal, round, and reactive to light. Conjunctivae/corneas clear. Neck: Supple, with full range of motion. No jugular venous distention. Trachea midline. Respiratory:  Normal respiratory effort. Clear to auscultation, bilaterally without Rales/Wheezes/Rhonchi. Cardiovascular: Regular rate and rhythm with normal S1/S2 without murmurs, rubs or gallops. Abdomen: Soft, non-tender, non-distended with normal bowel sounds. Musculoskeletal: No clubbing, cyanosis or edema bilaterally. Full range of motion without deformity.   Skin: Skin color, texture, turgor normal.  No rashes or lesions. Neurologic:  Neurovascularly intact without any focal sensory/motor deficits. Cranial nerves: II-XII intact, grossly non-focal.  Psychiatric: Alert and oriented x 3    Labs:   Recent Labs     10/20/20  0541 10/21/20  1530 10/22/20  0439   WBC 4.8 5.7 5.2   HGB 12.1 15.4 13.6   HCT 36.2 45.6 40.1   * 146 139     Recent Labs     10/20/20  0541 10/21/20  1530 10/22/20  0439    145 146*   K 3.0*  3.0* 3.0*  3.0* 3.4*   * 107 108   CO2 17* 26 28   BUN 11 13 15   CREATININE <0.5* 0.7 0.6   CALCIUM 7.0* 10.0 9.2   PHOS 2.0* 3.2 4.0     Recent Labs     10/20/20  0541 10/21/20  1530 10/22/20  0439   AST 17 41* 35   ALT 16 25 23   BILITOT 0.7 0.5 0.5   ALKPHOS 44 74 58     Recent Labs     10/20/20  0541 10/21/20  1530 10/22/20  0439   INR 0.99 0.90 1.00     Recent Labs     10/19/20  1415   TROPONINI <0.01       Urinalysis:      Lab Results   Component Value Date    NITRU POSITIVE 10/19/2020    WBCUA 14 10/19/2020    BACTERIA 4+ 10/19/2020    RBCUA 3 10/19/2020    BLOODU Negative 10/19/2020    SPECGRAV 1.022 10/19/2020    GLUCOSEU Negative 10/19/2020       Radiology:  CT CERVICAL SPINE WO CONTRAST   Final Result   No acute abnormality of the cervical spine. CT HEAD WO CONTRAST   Final Result   No acute intracranial abnormality. CT LUMBAR SPINE WO CONTRAST   Final Result   No acute fracture or subluxation of the lumbar spine. Multilevel spondylosis of the lumbar spine, including moderate central   stenosis at L3-4 and L4-5. CT Cervical Spine WO Contrast   Final Result   No acute fracture or subluxation. Spondylosis is noted at C5-6 and C6-7. CT Head WO Contrast   Final Result   No acute intracranial abnormality. Atrophy and white matter changes consistent with chronic small vessel   ischemic disease.          XR CHEST PORTABLE   Final Result   Findings as above which may be related to congestive heart failure and mild   edema. Viral infection could also have similar appearance. CTA CHEST W CONTRAST    (Results Pending)           Assessment/Plan:    Active Hospital Problems    Diagnosis    Encephalopathy due to infection [G93.49, B99.9]    UTI (urinary tract infection) [N39.0]    Bacterial urinary tract infection [N39.0, A49.9]    Hypothyroidism (acquired) [E03.9]     Klebsiella UTI  -Continue rocephibarrera Mejía.[place] 3/5]. Discharged on Bactrim DS for remainder of the course.     Fall/syncope-CT head with no acute findings. CT C spine shows Multilevel spondylosis of the lumbar spine, including moderate central stenosis at L3-4 and L4-5.   -Telemetry monitoring  -Echo shows EF 60-65% with no significant valvular disease  -Continue IVF  -Appreciate Neurology consultation. EEG planned. -Appreciate EP evaluation. No further work-up. -PT OT    Mechanical fall inpatient  -CT head and CT C-spine are negative  -Fall precautions    Agitation/Encephalopathy-likely due to infection and environment  Resolved  -PRN haldol and seroquel  -Out of Restraints  -Telemetry    Hypokalemia  -replaced potassium    Metabolic acidosis-due to lactic acidosis  -Resolved with bicarbonate drip  -trend lactic acid    Hypernatremia  -Increase free water intake    Elevated D-dimer  -Follow-up CTA chest to r/o PE. Repeat D dimer wnl.     History of hypothyroidism as per chart  Patient states does not take any medications  -TSH high and free T4 low, continue synthroid    DVT Prophylaxis: Lovenox  Diet: DIET GENERAL;  Code Status: Full Code    PT/OT Eval Status: Ordered    Dispo:Home with home health vs rehab tomorrow or over weekend    Joy Méndez MD Acute leg pain, left

## 2020-10-22 NOTE — PROGRESS NOTES
Physical Therapy/Occupational Therapy  [facility]  PT/OT eval attempt, pt having EEG. Will try back later time.  Horace Thompson [id number], Sukhi Walker., OTR/L, [id number]

## 2020-10-22 NOTE — ADT AUTH CERT
Utilization Reviews         Urinary Tract Infection (UTI) - Care Day 3 (10/21/2020) by George Guadarrama RN         Review Status  Review Entered    Completed  10/22/2020 15:56        Criteria Review       Care Day: 3 Care Date: 10/21/2020 Level of Care: Inpatient Floor    Guideline Day 1    Level Of Care    (X) Floor [G]    Clinical Status    ( ) * Clinical Indications met [H]    ( ) Fever    10/22/2020 3:56 PM EDT by Andre Aggarwal      99    (X) Possible dysuria, chills, and flank pain    Activity    ( ) Activity as tolerated    10/22/2020 3:56 PM EDT by Andre Aggarwal      bedrest    Routes    (X) IV fluids, medications    10/22/2020 3:56 PM EDT by Andre Aggarwal      sodium bicarbonate 150 mEq in dextrose 5 % @ 75ml/hr    (X) Diet as tolerated    10/22/2020 3:56 PM EDT by Andre Aggarwal      general    Interventions    (X) Urinalysis and urine culture    10/22/2020 3:56 PM EDT by Andre Aggarwal      Culture, Urine [[id number]]  (Abnormal)  Collected: 10/19/20 1700   Order Status: Completed Specimen: Urine, clean catch Updated: 10/21/20 0659   Organism Klebsiella pneumoniaeAbnormal     Urine Culture, Routine >100,000 CFU/ml    (X) Possible blood cultures    10/22/2020 3:56 PM EDT by Andre Aggarwal      pending    (X) Renal function tests, CBC    10/22/2020 3:56 PM EDT by Andre Aggarwal      CBC, BMP    (X) Possible CT, ultrasound, or other imaging test [C]    (X) Possible urinary catheter [E]    Medications    (X) Antibiotics    10/22/2020 3:56 PM EDT by Andre Aggarwal      rocephin iv    (X) Possible analgesics    * Milestone    Additional Notes    10/21     97.6 (36.4)   18   82   137/79   -   Semi fowlers   -   -   93   None (Room air)         Scheduled Meds:levothyroxine, 112 mcg, Oral, Daily    sodium chloride flush, 10 mL, Intravenous, 2 times per day    sodium chloride flush, 10 mL, Intravenous, 2 times per day    enoxaparin, 40 mg, Subcutaneous, Nightly    cefTRIAXone (ROCEPHIN) IV, 1 g, Intravenous, Q24H Haldol 3mg IM x1    Geodon 20mg IM x1       Continuous Infusions: sodium bicarbonate 150 mEq in dextrose 5 % @ 75ml/hr    PRN Meds given: haldol 2mg IM x1, haldol 5mg IM x1, ativan 2mg iv x1       CBC Auto Differential [[id number]] Collected: 10/21/20 1530      Specimen: Blood Updated: 10/21/20 1625      WBC 5.7 K/uL      RBC 4.71 M/uL      Hemoglobin 15.4 g/dL      Hematocrit 45.6 %      MCV 96.9 fL      MCH 32.7 pg      MCHC 33.8 g/dL      RDW 13.6 %      Platelets 839 K/uL      MPV 8.8 fL      Neutrophils % 53.2 %      Lymphocytes % 37.7 %      Monocytes % 7.1 %      Eosinophils % 1.3 %      Basophils % 0.7 %      Neutrophils Absolute 3.0 K/uL      Lymphocytes Absolute 2.1 K/uL      Monocytes Absolute 0.4 K/uL      Eosinophils Absolute 0.1 K/uL      Basophils Absolute 0.0 K/uL       Culture, Urine [[id number]] (Abnormal)   Collected: 10/19/20 1700      Specimen: Urine, clean catch Updated: 10/21/20 0659      Organism Klebsiella pneumoniae      Urine Culture, Routine >100,000 CFU/ml       Basic Metabolic Panel [[id number]] (Abnormal) Collected: 10/21/20 1530      Specimen: Blood Updated: 10/21/20 1631      Sodium 145 mmol/L      Potassium 3.0 mmol/L      Chloride 107 mmol/L      CO2 26 mmol/L      Anion Gap 12      Glucose 118 mg/dL      BUN 13 mg/dL      CREATININE 0.7 mg/dL      GFR Non- >60      GFR African American >60      Calcium 10.0 mg/dL       D-Dimer, Quantitative [[id number]] (Abnormal) Collected: 10/21/20 1530      Specimen: Blood Updated: 10/21/20 1632      D-Dimer, Quant 248 ng/mL DDU       Fibrinogen [[id number]] (Abnormal) Collected: 10/21/20 1530      Specimen: Blood Updated: 10/21/20 1633      Fibrinogen 412 mg/dL       Comprehensive Metabolic Panel w/ Reflex to MG [[id number]] (Abnormal) Collected: 10/21/20 1530      Specimen: Blood Updated: 10/21/20 1825      Potassium reflex Magnesium 3.0 mmol/L      Total Protein 7.5 g/dL      Alb 4.0 g/dL      Albumin/Globulin Ratio 1.1   Total Bilirubin 0.5 mg/dL      Alkaline Phosphatase 74 U/L      ALT 25 U/L      AST 41 U/L      Globulin 3.5 g/dL       IM note    Subjective:    Patient was agitated and removed multiple IVs yesterday. Domingo Beltran is refusing blood work this morning. She is agitated at bedside. She is trying to get out bed. She pulled my hand and tried to bite me         General appearance: No apparent distress, appears stated age and cooperative. Elderly female    HEENT: Pupils equal, round, and reactive to light. Conjunctivae/corneas clear. Neck: Supple, with full range of motion. No jugular venous distention. Trachea midline. Respiratory:  Normal respiratory effort. Clear to auscultation, bilaterally without Rales/Wheezes/Rhonchi. Cardiovascular: Regular rate and rhythm with normal S1/S2 without murmurs, rubs or gallops. Abdomen: Soft, non-tender, non-distended with normal bowel sounds. Musculoskeletal: No clubbing, cyanosis or edema bilaterally.  Full range of motion without deformity. Skin: Skin color, texture, turgor normal.  No rashes or lesions. Neurologic:  Neurovascularly intact without any focal sensory/motor deficits. Cranial nerves: II-XII intact, grossly non-focal.    Psychiatric: Alert and agitated. Confused. Assessment/Plan:         Active Hospital Problems      Diagnosis    · UTI (urinary tract infection) [N39.0]    · Bacterial urinary tract infection [N39.0, A49.9]    · Hypothyroidism (acquired) [E03.9]         Klebsiella UTI    -Continue rocephin [day 2/5].  Discharged on Bactrim DS for remainder of the course.         Fall/syncope-CT head with no acute findings. CT C spine shows Multilevel spondylosis of the lumbar spine, including moderate central stenosis at L3-4 and L4-5.    -Telemetry monitoring    -FU Echo    -Continue IVF    -FU Neurology consultation    -Appreciate EP evaluation.  No further work-up.     -PT OT         Agitation/Encephalopathy-likely due to infection and environment -PRN haldol    -Restraints    -telemetry         Hypokalemia    -replaced potassium         Metabolic acidosis-due to lactic acidosi    -Continue bicarbonate drip    -trend lactic acid         Elevated D-dimer    -Follow-up CTA chest to r/o PE         History of hypothyroidism as per chart    Patient states does not take any medications    -TSH high and free T4 low, continue synthroid         DVT Prophylaxis: Lovenox    Diet: DIET GENERAL;    Code Status: Full Code       Neuro consult    IMPRESSION :    Acute metabolic encephalopathy probably due to urinary tract infection    Viral encephalitis was considered in the differential diagnosis but felt to be less likely since patient clearly states that there is no headache at all. CT head did not show any acute changes    Significant hypothyroidism with TSH of 12.81    Patient Active Problem List    Diagnosis    · Hypothyroidism (acquired)    · Prediabetes    · Anxiety    · Postmenopausal    · Essential hypertension    · Bacterial urinary tract infection    · Fall    · Hypokalemia    · UTI (urinary tract infection)         RECOMMENDATIONS ;    Discussed with patient    Continue IV antibiotics    Check EEG    Slowly correct hypothyroidism    I will follow her with you.  If there is no improvement, she may need MRI brain as well as lumbar puncture. Cardio consult    Assessment and Plan:    Syncope                - Reports she did not have LOC, rather lost her balance and fell                - Monitor has been unremarkable and she has remained in SR without ectopy                - Denies CP. SOB, or palpitations                - Echo was unremarkable    Hypothyroidism                - Need replacement                - Per primary team    Hypokalemia                - 3.0 again today                - Monitor and replace per s/s order    Elevated BP                - No need for aggressive BP management in setting of acute illness    Acute metabolic encephalopathy?             - Following with neurology                - Plans for EEG         - No further cardiac workup necessary, will sign off         All pertinent information and plan of care discussed with the EP physician.         Multiple medical conditions with risk of decompensation.             Physician Advisor Recommendation by Sean Buckner RN         Review Status  Review Entered    In [facility]  10/21/2020 16:27        Criteria Review           Summary: We recommend that the following pt's current hospitalization under OBSERVATION. ..           Note type: Physician Advisor Note level: Hospital Account       Note text: We recommend that the following pt's current hospitalization under OBSERVATION        status is upgraded to INPATIENT; if you agree, please place a new ADMIT order       in [facility] as recommended. .                        Name: Valencia Jamison        : 1945        CSN: [id number]        INSURANCE: [employer]                               Clinical summary 66 y.o. F presented s/p a fall. Patient was initially       cognitively intact but apparently later became confused. She is somewhat drowsy       but is arousable. She is still confused. Patient has been sedated with Haldol       and Ativan because of agitation and confusion. Urinary tract infection was found       by urine analysis. She is now on IV antibiotics.  Pt with both tachypnea and       tachycardia as of 10/21.

## 2020-10-22 NOTE — PROGRESS NOTES
Physical Therapy    Facility/Department: [address]  Initial Assessment    NAME: Michelle Carrera  : 1945  MRN: [id number]    Date of Service: 10/22/2020    Discharge Recommendations:Amanda Marr scored a 16/24 on the AM-PAC short mobility form. Current research shows that an AM-PAC score of 17 or less is typically not associated with a discharge to the patient's home setting. Based on the patient's AM-PAC score and their current functional mobility deficits, it is recommended that the patient have 3-5 sessions per week of Physical Therapy at d/c to increase the patient's independence. Please see assessment section for further patient specific details. If patient discharges prior to next session this note will serve as a discharge summary. Please see below for the latest assessment towards goals. PT Equipment Recommendations: 24 hr supervision/assist  Equipment Needed: Yes(RW)    Assessment   Body structures, Functions, Activity limitations: Decreased functional mobility ; Decreased ADL status; Decreased strength;Decreased cognition;Decreased endurance;Decreased balance;Decreased coordination  Assessment: Pt with increased confusion, decreased mobility, balance,strength. Pt lives alone and has no family or friends able to assist. Pt needing skilled PT services to address these issues. Treatment Diagnosis: weakness, difficulty with gait. Prognosis: Good  Decision Making: Medium Complexity  PT Education: PT Role;Plan of Care;General Safety;Orientation; Functional Mobility Training;Equipment;Transfer Training  Patient Education: Pt verbalized good understanding but needing reinforcement. Barriers to Learning: cognition  REQUIRES PT FOLLOW UP: Yes  Activity Tolerance  Activity Tolerance: Patient limited by cognitive status; Patient limited by fatigue  Activity Tolerance: initally cognitive status but pt became more clear as treatment progressed.        Patient Diagnosis(es): The primary encounter Screening  Patient Currently in Pain: Denies          Orientation  Orientation  Overall Orientation Status: Impaired  Orientation Level: Oriented to person;Disoriented to place; Disoriented to time;Disoriented to situation  Social/Functional History  Social/Functional History  Lives With: Alone  Type of Home: Apartment([address]  Home Layout: One level  Home Access: Level entry  Home Equipment: Quad cane  ADL Assistance: Independent  Homemaking Assistance: Independent  Homemaking Responsibilities: Yes  Ambulation Assistance: Independent  Transfer Assistance: Independent  Active : Yes  Leisure & Hobbies: watch TV and movies  Additional Comments: Pt denies falls on past 6 mo. Until just prior to this admission. Pt using quad cane for amb. Pt reports limited social support. Cognition        Objective     Observation/Palpation  Posture: Good    AROM RLE (degrees)  RLE AROM: WFL  AROM LLE (degrees)  LLE AROM : WFL  Strength RLE  Comment: grossly +3/5  Strength LLE  Comment: grossly+3/5  Tone RLE  RLE Tone: Normotonic  Tone LLE  LLE Tone: Normotonic  Motor Control  Gross Motor?: (slow movements, slow to follow VCs)  Sensation  Overall Sensation Status: WFL  Bed mobility  Supine to Sit: Contact guard assistance  Sit to Supine: Stand by assistance  Scooting: Stand by assistance  Comment: Pt sat EOB for 5 min with good balance. Initially drowsy, Alertness improving during sitting. Transfers  Sit to Stand: Contact guard assistance  Stand to sit: Contact guard assistance(VCs needed to sit back far enough on toilet)  Comment: from EOB and toilet  Ambulation  Ambulation?: Yes  Ambulation 1  Surface: level tile  Device: Rolling Walker  Other Apparatus: O2(1LO2)  Assistance: Contact guard assistance  Quality of Gait: RW direction assist needed  Gait Deviations: Slow Yana;Decreased step length;Decreased step height  Distance: Pt amb with RW and CGA for 15 ft, then 10 ft.   Comments: Pt amb to bathroom and urinated, pt needing assist with hygiene and brief change. Pt then stood at sink for 5 min for ADLs. Pt stood with SBA. Pt then returned to bed per nursing request.  Stairs/Curb  Stairs?: No     Balance  Posture: Good  Sitting - Static: Good  Sitting - Dynamic: Good;-  Standing - Static: Good;-  Standing - Dynamic: Fair;+(with RW)  Comments: Some RW direction assist needed through tight spaces in room . Plan   Plan  Times per week: 3-5x/week  Times per day: Daily  Current Treatment Recommendations: Strengthening, Balance Training, Functional Mobility Training, Transfer Training, ADL/Self-care Training, Endurance Training, Gait Training, Cognitive Reorientation, Home Exercise Program, Safety Education & Training, Patient/Caregiver Education & Training, Equipment Evaluation, Education, & procurement  Safety Devices  Type of devices: All fall risk precautions in place, Bed alarm in place, Call light within reach, Gait belt, Patient at risk for falls, Left in bed, Nurse notified  Restraints  Initially in place: No    G-Code       OutComes Score                                                  AM-PAC Score  AM-PAC Inpatient Mobility Raw Score : 16 (10/22/20 1145)  AM-PAC Inpatient T-Scale Score : 40.78 (10/22/20 1145)  Mobility Inpatient CMS 0-100% Score: 54.16 (10/22/20 1145)  Mobility Inpatient CMS G-Code Modifier : CK (10/22/20 1145)          Goals  Short term goals  Time Frame for Short term goals: to be met by DC  Short term goal 1: Pt to perform bed mob with mod I. Short term goal 2: Pt to perform transfers with mod I. Short term goal 3: Pt to amb with AAD mod I for 100 ft.   Long term goals  Time Frame for Long term goals : LTGs=STGs  Patient Goals   Patient goals : Did not state       Therapy Time   Individual Concurrent Group Co-treatment   Time In 1051         Time Out 1132         Minutes 41         Timed Code Treatment Minutes: [address], [id number]

## 2020-10-22 NOTE — PROGRESS NOTES
Pt assisted to the BR with her cane and IV pole. Pt stood up rapidly from the toilet and fell hitting the right side of her forehead. Pt assisted back to the bed. Vitals as charted. Message sent to Dr. Aniket Jean.   Susan Mckenna RN

## 2020-10-22 NOTE — PROGRESS NOTES
Relationships    Social connections     Talks on phone: None     Gets together: None     Attends Sikh service: None     Active member of club or organization: None     Attends meetings of clubs or organizations: None     Relationship status: None    Intimate partner violence     Fear of current or ex partner: None     Emotionally abused: None     Physically abused: None     Forced sexual activity: None   Other Topics Concern    None   Social History Narrative    None        PHYSICAL EXAMINATION      /67   Pulse 95   Temp 97.4 °F (36.3 °C) (Temporal)   Resp 16   Ht 5' 6\" (1.676 m)   Wt 155 lb (70.3 kg)   SpO2 94%   BMI 25.02 kg/m²   This is a well-nourished patient in no acute distress  Patient is awake, alert and oriented x3. Speech is normal.  Pupils are equal round reacting to light. Extraocular movements intact. Face symmetrical. Tongue midline. Motor exam shows normal symmetrical strength. Deep tendon reflexes normal. Plantar reflexes downgoing. Sensory exam normal. Coordination normal. No carotid bruit. No neck stiffness.     DATA :  LABS:  General Labs:    CBC:   Lab Results   Component Value Date    WBC 5.2 10/22/2020    RBC 4.18 10/22/2020    HGB 13.6 10/22/2020    HCT 40.1 10/22/2020    MCV 96.1 10/22/2020    MCH 32.6 10/22/2020    MCHC 33.9 10/22/2020    RDW 13.9 10/22/2020     10/22/2020    MPV 8.8 10/22/2020     BMP:    Lab Results   Component Value Date     10/22/2020    K 3.4 10/22/2020    K 3.0 10/21/2020     10/22/2020    CO2 28 10/22/2020    BUN 15 10/22/2020    LABALBU 3.5 10/22/2020    CREATININE 0.6 10/22/2020    CALCIUM 9.2 10/22/2020    GFRAA >60 10/22/2020    LABGLOM >60 10/22/2020    GLUCOSE 167 10/22/2020     RADIOLOGY REVIEW:  I have reviewed radiology image(s) and reports(s) of: CT scan of the head      IMPRESSION :  Metabolic encephalopathy due to urinary tract infection, improving  Confusional state improving  Generalized weakness due to urinary tract infection  Hypothyroidism  Patient Active Problem List   Diagnosis    Hypothyroidism (acquired)    Prediabetes    Anxiety    Postmenopausal    Essential hypertension    Bacterial urinary tract infection    Fall    Hypokalemia    UTI (urinary tract infection)    Encephalopathy due to infection       RECOMMENDATIONS :  Discussed with patient  Continue antibiotics for urinary infection  Continue physical therapy evaluation  Adjust thyroid replacement for the hypothyroidism          Please note a portion of this chart was generated using dragon dictation software. Although every effort was made to ensure the accuracy of this automated transcription, some errors in transcription may have occurred.          Meg Sanchez M.D.

## 2020-10-22 NOTE — PROGRESS NOTES
Pt attempting to get out of bed. Nurses sent to room and helped to get pt back in bed and changed. Pt still confused. Haldol given for agitationa dn safety concerns. Pt in 2 soft restraints.  Will move closer to nurses station when room becomes available

## 2020-10-22 NOTE — PROGRESS NOTES
Occupational Therapy   Occupational Therapy Initial Assessment  Date: 10/22/2020   Patient Name: Ros Huffman  MRN: [id number]     : 1945    Date of Service: 10/22/2020    Discharge Recommendations:  Ros Huffman scored a 16/24 on the AM-PAC ADL Inpatient form. Current research shows that an AM-PAC score of 17 or less is typically not associated with a discharge to the patient's home setting. Based on the patient's AM-PAC score and their current ADL deficits, it is recommended that the patient have 3-5 sessions per week of Occupational Therapy at d/c to increase the patient's independence. Please see assessment section for further patient specific details. If patient discharges prior to next session this note will serve as a discharge summary. Please see below for the latest assessment towards goals. OT Equipment Recommendations  Equipment Needed: No(TBD next level of care)    Assessment   Performance deficits / Impairments: Decreased functional mobility ; Decreased ADL status; Decreased cognition;Decreased endurance;Decreased strength  Assessment: Pt is not at her baseline level of occupatioinal function, based on the above deficits associated with UTI. Pt would benefit from continued skilled acute OT services to address these deficits. Treatment Diagnosis: Decreased ADL status, functional mobility, strength, endurance and cognition associated with UTI  Prognosis: Good  Decision Making: Low Complexity  History: Pt 67 yo, lives alone, [address] apt, no steps, independent ADLs/IADLs, ambulation. No recent falls. 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 Exam: ROM, MMT, 6 clicks,5 performance deficits/impairments, evolving presentation  Assistance / Modification: CGA for functional mobility, Max A LB dressing, Mod A toileting  OT Education: OT Role;Plan of Care;Transfer Training;Orientation  Patient Education: D/C recommendation.  Pt needs reinforcement d/t decreased cognition. Barriers to Learning: Cognition  REQUIRES OT FOLLOW UP: Yes  Activity Tolerance  Activity Tolerance: Patient Tolerated treatment well  Safety Devices  Safety Devices in place: Yes  Type of devices: All fall risk precautions in place; Bed alarm in place; Left in bed;Patient at risk for falls;Call light within reach;Nurse notified;Gait belt(low bed, floor mats, visual surveilence)           Patient Diagnosis(es): The primary encounter diagnosis was Sepsis, due to unspecified organism, unspecified whether acute organ dysfunction present ([facility] [address] Diagnoses of General weakness, Fall, initial encounter, Syncope and collapse, Lactic acidosis, and Bacterial urinary tract infection were also pertinent to this visit. has a past medical history of Anxiety, Hypothyroidism, Irritable bowel syndrome, and Prediabetes. has a past surgical history that includes Hysterectomy. Treatment Diagnosis: Decreased ADL status, functional mobility, strength, endurance and cognition associated with UTI      Restrictions  Restrictions/Precautions  Restrictions/Precautions: Fall Risk(high fall risk)  Position Activity Restriction  Other position/activity restrictions: Nidia Smith is a 66 y.o. female presents to the emergency department via emergency medical services for the home environment with reports of a fall. Upon questioning the patient is unsure what caused her to fall. When asked if there is a possibility that she felt funny and passed out she said she felt fine but does believe that she passed out. Patient states she had been walking from one room to another. She states that she does not believe that she hit her head but reports that she does remember waking up on the ground. She is not currently anticoagulated. She states she did remember landing on her buttock and reports that there is just minimal amounts of discomfort noted in her low back.     Subjective   General  Chart Reviewed: Yes  Family / Caregiver Present: No  Referring Practitioner: Taylor Walker MD , for d/c planning  Diagnosis: UTI  Subjective  Subjective: Pt supine in bed, very lethargic, agreeable to OT eval. Denies pain. Pt much more alert and interactive after sitting up on EOB and ambulating to bathroom. Patient Currently in Pain: Denies  Pain Assessment  Pain Assessment: 0-10  Pain Level: 0  Pre Treatment Pain Screening  Intervention List: Patient able to continue with treatment  Vital Signs  Oxygen Therapy  SpO2: 94 %  Pulse Oximeter Device Mode: Intermittent  Pulse Oximeter Device Location: Finger  O2 Device: Nasal cannula  O2 Flow Rate (L/min): 1 L/min  Social/Functional History  Social/Functional History  Lives With: Alone  Type of Home: Apartment([address]  Home Layout: One level  Home Access: Level entry  Home Equipment: Quad cane  ADL Assistance: Independent  Homemaking Assistance: Independent  Homemaking Responsibilities: Yes  Ambulation Assistance: Independent  Transfer Assistance: Independent  Active : Yes  Leisure & Hobbies: watch TV and movies  Additional Comments: Pt denies falls on past 6 mo. Pt using quad cane for amb. Pt reports limited social support. Objective   Vision: Impaired  Vision Exceptions: Wears glasses at all times(needs glasses, does not have them with her.)  Hearing: Within functional limits    Orientation  Overall Orientation Status: Within Functional Limits(knows year, stated mo is Nov. Lethargic initially, took extra time to answer questions.)  Observation/Palpation  Posture: Good  Balance  Sitting Balance: Contact guard assistance(dynamic; SBA static, ~5 min)  Standing Balance  Time: ~1 min, ~4 min  Activity: functional mobility to bathroom & clothing mgt, toilet transfer; toileting, washing hands & combing hair at sink, mobility to EOB  Comment: No LOB, verbal cues for maneuvering walker around objects.   Functional Mobility  Functional - Mobility Device: Rolling Walker  Activity: To/from bathroom  Assist Level: Contact guard assistance  Toilet Transfers  Toilet - Technique: Ambulating  Equipment Used: Standard toilet(grab bar)  Toilet Transfer: Contact guard assistance  ADL  Feeding: Independent  Grooming: Contact guard assistance;Verbal cueing;Setup(in stance at sink to comb hair)  LE Dressing: Maximum assistance;Verbal cueing; Increased time to complete(Dependent socks, Mod A don pullup; asked pt to try holding pullup w/R hand to thread R foot, but pt kept using L hand & unsuccessful)  Toileting: Moderate assistance(verbal cues for hygiene technique)  Tone RUE  RUE Tone: Normotonic  Tone LUE  LUE Tone: Normotonic  Coordination  Movements Are Fluid And Coordinated: Yes(for handwashing, combing hair; unable to follow demo of finger-thumb opposition)     Bed mobility  Supine to Sit: Contact guard assistance  Sit to Supine: Stand by assistance  Scooting: Stand by assistance  Transfers  Stand Step Transfers: Contact guard assistance(w/RW)  Sit to stand: Contact guard assistance(from EOB, from toilet)  Stand to sit: Contact guard assistance(to EOB, to toilet)  Vision - Basic Assessment  Prior Vision: Other (add comment)(Needs glasses; does not have them with her)  Visual History: No significant visual history  Patient Visual Report: No visual complaint reported. Cognition  Overall Cognitive Status: Exceptions  Arousal/Alertness: Delayed responses to stimuli(Improved during session)  Following Commands: Follows one step commands consistently; Follows one step commands with increased time  Attention Span: Appears intact  Problem Solving: Assistance required to generate solutions;Assistance required to implement solutions  Insights: Decreased awareness of deficits  Initiation: Does not require cues  Sequencing: Requires cues for some  Perception  Overall Perceptual Status: WFL     Sensation  Overall Sensation Status: WFL        LUE AROM (degrees)  LUE AROM : WFL  Left Hand AROM (degrees)  Left Hand AROM: WFL  RUE AROM (degrees)  RUE AROM : WFL  Right Hand AROM (degrees)  Right Hand AROM: WFL  LUE Strength  Gross LUE Strength: WFL(elbow grossly 4+/5; NT shoulder; WFL for bed mobility, transfers)  L Hand General: 3/5(grossly)  RUE Strength  Gross RUE Strength: WFL(elbow grossly 4+/5; NT shoulder; WFL for bed mobility, transfers)  R Hand General: 3/5(grossly)                   Plan   Plan  Times per week: 3-5  Current Treatment Recommendations: Self-Care / ADL, Safety Education & Training, Endurance Training, Functional Mobility Training, Cognitive Reorientation    AM-PAC Score        AM-[facility] Inpatient Daily Activity Raw Score: 16 (10/22/20 1201)  AM-PAC Inpatient ADL T-Scale Score : 35.96 (10/22/20 1201)  ADL Inpatient CMS 0-100% Score: 53.32 (10/22/20 1201)  ADL Inpatient CMS G-Code Modifier : CK (10/22/20 1201)    Goals  Short term goals  Time Frame for Short term goals: Discharge  Short term goal 1: SBA for functional transfers to ADL surfaces w/RW  Short term goal 2: SBA for functional mobiltiy w/RW for ADL activity  Short term goal 3: SBA for toileting  Short term goal 4: S/U for UB bathing/dressing  Short term goal 5: Min A for LB bathing/dressing       Therapy Time   Individual Concurrent Group Co-treatment   Time In 1051         Time Out 1131         Minutes 40               Timed Code Treatment Minutes:  26 Minutes    Total Treatment Minutes:  [id number] [place], OT Claudell Monday., OTR/L, [id number]

## 2020-10-23 LAB
A/G RATIO: 1.2 (ref 1.1–2.2)
ALBUMIN SERPL-MCNC: 3.6 G/DL (ref 3.4–5)
ALP BLD-CCNC: 63 U/L (ref 40–129)
ALT SERPL-CCNC: 22 U/L (ref 10–40)
ANION GAP SERPL CALCULATED.3IONS-SCNC: 9 MMOL/L (ref 3–16)
APTT: 28 SEC (ref 24.2–36.2)
AST SERPL-CCNC: 29 U/L (ref 15–37)
BASOPHILS ABSOLUTE: 0.1 K/UL (ref 0–0.2)
BASOPHILS RELATIVE PERCENT: 1 %
BILIRUB SERPL-MCNC: 0.6 MG/DL (ref 0–1)
BLOOD CULTURE, ROUTINE: NORMAL
BUN BLDV-MCNC: 20 MG/DL (ref 7–20)
CALCIUM SERPL-MCNC: 8.7 MG/DL (ref 8.3–10.6)
CHLORIDE BLD-SCNC: 108 MMOL/L (ref 99–110)
CO2: 26 MMOL/L (ref 21–32)
CREAT SERPL-MCNC: 0.7 MG/DL (ref 0.6–1.2)
D DIMER: 223 NG/ML DDU (ref 0–229)
EOSINOPHILS ABSOLUTE: 0.1 K/UL (ref 0–0.6)
EOSINOPHILS RELATIVE PERCENT: 2.4 %
FIBRINOGEN: 396 MG/DL (ref 200–397)
GFR AFRICAN AMERICAN: >60
GFR NON-AFRICAN AMERICAN: >60
GLOBULIN: 3.1 G/DL
GLUCOSE BLD-MCNC: 141 MG/DL (ref 70–99)
HCT VFR BLD CALC: 39.8 % (ref 36–48)
HEMOGLOBIN: 13.4 G/DL (ref 12–16)
INR BLD: 0.99 (ref 0.86–1.14)
LYMPHOCYTES ABSOLUTE: 2.4 K/UL (ref 1–5.1)
LYMPHOCYTES RELATIVE PERCENT: 46.4 %
MAGNESIUM: 2.3 MG/DL (ref 1.8–2.4)
MCH RBC QN AUTO: 32.5 PG (ref 26–34)
MCHC RBC AUTO-ENTMCNC: 33.6 G/DL (ref 31–36)
MCV RBC AUTO: 96.7 FL (ref 80–100)
MONOCYTES ABSOLUTE: 0.4 K/UL (ref 0–1.3)
MONOCYTES RELATIVE PERCENT: 8.2 %
NEUTROPHILS ABSOLUTE: 2.2 K/UL (ref 1.7–7.7)
NEUTROPHILS RELATIVE PERCENT: 42 %
PDW BLD-RTO: 13.4 % (ref 12.4–15.4)
PHOSPHORUS: 4 MG/DL (ref 2.5–4.9)
PLATELET # BLD: 152 K/UL (ref 135–450)
PMV BLD AUTO: 8.9 FL (ref 5–10.5)
POTASSIUM REFLEX MAGNESIUM: 3.8 MMOL/L (ref 3.5–5.1)
POTASSIUM SERPL-SCNC: 3.8 MMOL/L (ref 3.5–5.1)
PROTHROMBIN TIME: 11.5 SEC (ref 10–13.2)
RBC # BLD: 4.11 M/UL (ref 4–5.2)
SODIUM BLD-SCNC: 143 MMOL/L (ref 136–145)
TOTAL PROTEIN: 6.7 G/DL (ref 6.4–8.2)
WBC # BLD: 5.2 K/UL (ref 4–11)

## 2020-10-23 PROCEDURE — 85379 FIBRIN DEGRADATION QUANT: CPT

## 2020-10-23 PROCEDURE — 84100 ASSAY OF PHOSPHORUS: CPT

## 2020-10-23 PROCEDURE — 6370000000 HC RX 637 (ALT 250 FOR IP): Performed by: INTERNAL MEDICINE

## 2020-10-23 PROCEDURE — 36415 COLL VENOUS BLD VENIPUNCTURE: CPT

## 2020-10-23 PROCEDURE — 80053 COMPREHEN METABOLIC PANEL: CPT

## 2020-10-23 PROCEDURE — 2700000000 HC OXYGEN THERAPY PER DAY

## 2020-10-23 PROCEDURE — 85025 COMPLETE CBC W/AUTO DIFF WBC: CPT

## 2020-10-23 PROCEDURE — 85384 FIBRINOGEN ACTIVITY: CPT

## 2020-10-23 PROCEDURE — 94760 N-INVAS EAR/PLS OXIMETRY 1: CPT

## 2020-10-23 PROCEDURE — 99232 SBSQ HOSP IP/OBS MODERATE 35: CPT | Performed by: PSYCHIATRY & NEUROLOGY

## 2020-10-23 PROCEDURE — 6360000002 HC RX W HCPCS: Performed by: INTERNAL MEDICINE

## 2020-10-23 PROCEDURE — 2580000003 HC RX 258: Performed by: INTERNAL MEDICINE

## 2020-10-23 PROCEDURE — 85730 THROMBOPLASTIN TIME PARTIAL: CPT

## 2020-10-23 PROCEDURE — 2060000000 HC ICU INTERMEDIATE R&B

## 2020-10-23 PROCEDURE — 83735 ASSAY OF MAGNESIUM: CPT

## 2020-10-23 PROCEDURE — 85610 PROTHROMBIN TIME: CPT

## 2020-10-23 RX ORDER — QUETIAPINE FUMARATE 25 MG/1
25 TABLET, FILM COATED ORAL NIGHTLY PRN
Qty: 30 TABLET | Refills: 0
Start: 2020-10-23 | End: 2020-10-24

## 2020-10-23 RX ORDER — ZIPRASIDONE MESYLATE 20 MG/ML
20 INJECTION, POWDER, LYOPHILIZED, FOR SOLUTION INTRAMUSCULAR ONCE
Status: DISCONTINUED | OUTPATIENT
Start: 2020-10-23 | End: 2020-10-24 | Stop reason: HOSPADM

## 2020-10-23 RX ORDER — SULFAMETHOXAZOLE AND TRIMETHOPRIM 800; 160 MG/1; MG/1
1 TABLET ORAL 2 TIMES DAILY
Qty: 2 TABLET | Refills: 0
Start: 2020-10-23 | End: 2020-10-24 | Stop reason: HOSPADM

## 2020-10-23 RX ADMIN — Medication 10 ML: at 06:30

## 2020-10-23 RX ADMIN — Medication 10 ML: at 07:36

## 2020-10-23 RX ADMIN — HALOPERIDOL LACTATE 5 MG: 5 INJECTION, SOLUTION INTRAMUSCULAR at 17:20

## 2020-10-23 RX ADMIN — Medication 1 G: at 06:30

## 2020-10-23 RX ADMIN — LEVOTHYROXINE SODIUM 112 MCG: 0.11 TABLET ORAL at 06:45

## 2020-10-23 RX ADMIN — ENOXAPARIN SODIUM 40 MG: 40 INJECTION SUBCUTANEOUS at 21:40

## 2020-10-23 ASSESSMENT — PAIN SCALES - GENERAL
PAINLEVEL_OUTOF10: 0

## 2020-10-23 NOTE — PLAN OF CARE
Problem: Falls - Risk of:  Goal: Will remain free from falls  Description: Will remain free from falls  10/23/2020 1209 by Vinay Ashley RN  Outcome: Ongoing  Note: Free of falls this shift. Pt is contact guard. Non slip socks on. Bedside commone in use. Calls for assistance. Problem: Skin Integrity:  Goal: Will show no infection signs and symptoms  Description: Will show no infection signs and symptoms  10/23/2020 1209 by Vinay Ashley RN  Outcome: Ongoing  Note: No new signs of breakdown.

## 2020-10-23 NOTE — CARE COORDINATION
Met with patient and she is agreeable to start [facility] for discharge to [address] [place] [address] Verline Blades [phone]).

## 2020-10-23 NOTE — PROGRESS NOTES
Massimo Cifuentes  Neurology Follow-up  [facility] Neurology    Date of Service: 10/23/2020    Subjective:   CC: Follow up today regarding: Acute encephalopathy and delirium    This is my first encounter with the patient. The patient was seen by Dr. Milady Ram. I was able to review the patient's record, imaging, notes from different physicians and recent events. The patient is a 66y.o.  years old female with history of hypothyroid, cognitive impairment and other medical problems who was admitted to the hospital 2 days ago with acute confusion. Symptoms are moderate severe. Duration was few hours prior to admission and persistent. No triggers or other associated symptoms except initial work-up revealed UTI. No other relieving or aggravating factors. Patient was diagnosed with metabolic encephalopathy secondary to UTI. She was placed on antibiotics. Further work-up with CT of the head and CT of the C-spine showed no acute abnormalities. Today she is awake and alert. She denies any new symptoms. She is able to follow direction. She denies any pain or headache or focal weakness. Other review of system was unremarkable    ROS:   A 10-12 system obtained and updated today and is unremarkable except as mentioned in my HPI    family history includes No Known Problems in her daughter, sister, son, and son; Thyroid Disease in her sister.   Past Medical History:   Diagnosis Date    Anxiety     Hypothyroidism     Irritable bowel syndrome     Prediabetes      Current Facility-Administered Medications   Medication Dose Route Frequency Provider Last Rate Last Dose    haloperidol lactate (HALDOL) injection 5 mg  5 mg Intramuscular Q6H PRN Susie Burroughs MD   5 mg at 10/21/20 2023    QUEtiapine (SEROQUEL) tablet 25 mg  25 mg Oral BID PRN Susie Burroughs MD        potassium chloride (KLOR-CON M) extended release tablet 40 mEq  40 mEq Oral PRN Susie Burroughs MD        Or    potassium bicarb-citric acid (EFFER-K) effervescent tablet 40 mEq  40 mEq Oral PRN Jay Moon MD        Or    potassium chloride 10 mEq/100 mL IVPB (Peripheral Line)  10 mEq Intravenous PRN Jay Moon MD        levothyroxine (SYNTHROID) tablet 112 mcg  112 mcg Oral Daily Jay Moon MD   112 mcg at 10/23/20 0645    sodium chloride flush 0.9 % injection 10 mL  10 mL Intravenous 2 times per day Lindsay ARMENDARIZ MD   10 mL at 10/21/20 1058    sodium chloride flush 0.9 % injection 10 mL  10 mL Intravenous PRN Steven Rosario MD        sodium chloride flush 0.9 % injection 10 mL  10 mL Intravenous 2 times per day Lindsay ARMENDARIZ MD   10 mL at 10/23/20 0736    sodium chloride flush 0.9 % injection 10 mL  10 mL Intravenous PRN Steven ARMENDARIZ MD   10 mL at 10/23/20 0630    acetaminophen (TYLENOL) tablet 650 mg  650 mg Oral Q6H PRN Steven Rosario MD        Or    acetaminophen (TYLENOL) suppository 650 mg  650 mg Rectal Q6H PRN Steven Rosario MD        polyethylene glycol (GLYCOLAX) packet 17 g  17 g Oral Daily PRN Steven Rosario MD        promethazine (PHENERGAN) tablet 12.5 mg  12.5 mg Oral Q6H PRN Steven ARMENDARIZ MD        Or    ondansetron (ZOFRAN) injection 4 mg  4 mg Intravenous Q6H PRN Steven ARMENDARIZ MD        enoxaparin (LOVENOX) injection 40 mg  40 mg Subcutaneous Nightly Steven ARMENDARIZ MD   40 mg at 10/22/20 2054    perflutren lipid microspheres (DEFINITY) injection 1.65 mg  1.5 mL Intravenous ONCE PRN Steven Rosario MD        cefTRIAXone (ROCEPHIN) 1 g in sterile water 10 mL IV syringe  1 g Intravenous Q24H Steven ARMENDARIZ MD   1 g at 10/23/20 0630    acetaminophen (TYLENOL) tablet 650 mg  650 mg Oral Q6H PRN Steven ARMENDARIZ MD        Or    acetaminophen (TYLENOL) suppository 650 mg  650 mg Rectal Q6H PRN Steven ARMENDARIZ MD         No Known Allergies   reports that she has never smoked. She has never used smokeless tobacco. She reports that she does not drink alcohol. Objective:  Exam:  Constitutional:   Vitals:    10/22/20 1920 10/23/20 0030 10/23/20 0630 10/23/20 0745   BP: (!) 149/72 (!) 169/93 (!) 152/89 (!) 156/83   Pulse: 76 66 66 60   Resp: 17 16 16 16   Temp: 97.5 °F (36.4 °C) 97.3 °F (36.3 °C) 97.9 °F (36.6 °C) 97.7 °F (36.5 °C)   TempSrc: Temporal Temporal Oral Temporal   SpO2: 92% 94% 94% 93%   Weight:       Height:         General appearance:  Normal development and appear in no acute distress. Eye: No icterus. Fundus: Cannot be examined due to recent COVID-19 restrictions  Neck: supple  Cardiovascular:  No lower leg edema with good pulsation. Mental Status:   AAO x2 only today  Good attention but easily distracted  Fluent speech with poor vocabulary  Poor immediate recall and remote memory  Poor fund of knowledge  Cranial Nerves:   II: Visual fields: Full. Pupils: equal, round, reactive to light  III,IV,VI: Extra Ocular Movements are intact.  No nystagmus  V: Facial sensation is intact  VII: Facial strength and movements: intact and symmetric  IX: Palate elevation is symmetric  XI: Shoulder shrug is intact  XII: Tongue movements are normal  Musculoskeletal: Generalized diffuse weakness 4/5 with poor effort  Reflexes 1+ throughout  Normal tone  Cerebellar exam showed no tremors  Normal sensation  Gait cannot be tested        Data:  LABS:   Lab Results   Component Value Date     10/23/2020    K 3.8 10/23/2020    K 3.8 10/23/2020     10/23/2020    CO2 26 10/23/2020    BUN 20 10/23/2020    CREATININE 0.7 10/23/2020    GFRAA >60 10/23/2020    LABGLOM >60 10/23/2020    GLUCOSE 141 10/23/2020    PHOS 4.0 10/23/2020    MG 2.30 10/23/2020    CALCIUM 8.7 10/23/2020     Lab Results   Component Value Date    WBC 5.2 10/23/2020    RBC 4.11 10/23/2020    HGB 13.4 10/23/2020    HCT 39.8 10/23/2020    MCV 96.7 10/23/2020    RDW 13.4 10/23/2020     10/23/2020     Lab Results   Component Value Date    INR 0.99 10/23/2020    PROTIME 11.5 10/23/2020 Neuroimaging was independently reviewed by me and discussed results with the patient   I reviewed blood testing and other test results and discussed results with the patient  Reviewed notes from different physicians. Impression:  Acute delirium superimposed on chronic cognitive impairment. Likely metabolic encephalopathy secondary to UTI and mechanical fall  Mechanical fall  Hypothyroid        Recommendation:     Continue current supportive care  PT and OT  Hydration  Antibiotics  Continue Synthroid  DVT and GI prophylaxis  Falling precautions  Telemetry  Can be discharged from neurology when medically stable  No further recommendation  We will sign off        Terese House MD   [phone]      This dictation was generated by voice recognition computer software. Although all attempts are made to edit the dictation for accuracy, there may be errors in the transcription that are not intended.

## 2020-10-23 NOTE — PROGRESS NOTES
Hospitalist Progress Note      PCP: Yuliya Howe MD    Date of Admission: 10/19/2020    [address] who presented with multiple falls. She is noted to have UTI. She is admitted for syncope workup. Subjective:   No events overnight. Afebrile. She is calm. Medications:  Reviewed    Infusion Medications     Scheduled Medications    levothyroxine  112 mcg Oral Daily    sodium chloride flush  10 mL Intravenous 2 times per day    sodium chloride flush  10 mL Intravenous 2 times per day    enoxaparin  40 mg Subcutaneous Nightly    cefTRIAXone (ROCEPHIN) IV  1 g Intravenous Q24H     PRN Meds: haloperidol lactate, QUEtiapine, potassium chloride **OR** potassium alternative oral replacement **OR** potassium chloride, sodium chloride flush, sodium chloride flush, acetaminophen **OR** acetaminophen, polyethylene glycol, promethazine **OR** ondansetron, perflutren lipid microspheres, acetaminophen **OR** acetaminophen      Intake/Output Summary (Last 24 hours) at 10/23/2020 1222  Last data filed at 10/23/2020 0925  Gross per 24 hour   Intake 730 ml   Output --   Net 730 ml       Physical Exam Performed:    BP (!) 146/71   Pulse 67   Temp 97.9 °F (36.6 °C) (Temporal)   Resp 16   Ht 5' 6\" (1.676 m)   Wt 155 lb (70.3 kg)   SpO2 93%   BMI 25.02 kg/m²     General appearance: No apparent distress, appears stated age and cooperative. Elderly female  HEENT: Pupils equal, round, and reactive to light. Conjunctivae/corneas clear. Neck: Supple, with full range of motion. No jugular venous distention. Trachea midline. Respiratory:  Normal respiratory effort. Clear to auscultation, bilaterally without Rales/Wheezes/Rhonchi. Cardiovascular: Regular rate and rhythm with normal S1/S2 without murmurs, rubs or gallops. Abdomen: Soft, non-tender, non-distended with normal bowel sounds. Musculoskeletal: No clubbing, cyanosis or edema bilaterally. Full range of motion without deformity.   Skin: Skin failure and mild   edema. Viral infection could also have similar appearance. Assessment/Plan:    Active Hospital Problems    Diagnosis    Acute metabolic encephalopathy [H34.32]    Encephalopathy due to infection [G93.49, B99.9]    UTI (urinary tract infection) [N39.0]    Bacterial urinary tract infection [N39.0, A49.9]    Hypothyroidism (acquired) [E03.9]     Klebsiella UTI  -Continue rocephin [day 4/5]. Discharged on Bactrim DS for remainder of the course.     Fall/syncope-CT head with no acute findings. CT C spine shows Multilevel spondylosis of the lumbar spine, including moderate central stenosis at L3-4 and L4-5.   -Telemetry monitoring  -Echo shows EF 60-65% with no significant valvular disease  -Appreciate Neurology consultation. EEG shows encephalopathy. No further work-up. -Appreciate EP evaluation. No further work-up. -PT OT    Mechanical fall inpatient  -CT head and CT C-spine are negative  -Fall precautions    Agitation/Encephalopathy-likely due to infection and environment  Resolved  -PRN haldol and seroquel  -Out of Restraints  -Telemetry    Hypokalemia  -Monitor and replace    Metabolic acidosis-due to lactic acidosis  -Resolved with bicarbonate drip  -trend lactic acid    Hypernatremia  -Resolved with increase fluid intake    Elevated D-dimer  -Repeat D-dimer within normal limits. Elevation was likely due to infection. History of hypothyroidism as per chart  Patient states does not take any medications  -TSH high and free T4 low, continue synthroid    DVT Prophylaxis: Lovenox  Diet: DIET GENERAL;  Code Status: Full Code    PT/OT Eval Status: Ordered    Dispo: Patient is okay for discharge. She will need placement for acute rehab.     Ana Gonzalez MD

## 2020-10-23 NOTE — PLAN OF CARE
Problem: Falls - Risk of:  Goal: Will remain free from falls  Description: Will remain free from falls  10/23/2020 1813 by Godwin Calhoun RN  Outcome: Completed  10/23/2020 1209 by Godwin Calhoun RN  Outcome: Ongoing  Note: Free of falls this shift. Pt is contact guard. Non slip socks on. Bedside commone in use. Calls for assistance.    Goal: Absence of physical injury  Description: Absence of physical injury  10/23/2020 1813 by Godwin Calhoun RN  Outcome: Completed  10/23/2020 1209 by Godwin Calhoun RN  Outcome: Ongoing

## 2020-10-23 NOTE — CARE COORDINATION
The Plan for Transition of Care is related to the following treatment goals: SNF    The Patient  was provided with a choice of provider and agrees   with the discharge plan. [x] Yes [] No    Freedom of choice list was provided with basic dialogue that supports the patient's individualized plan of care/goals, treatment preferences and shares the quality data associated with the providers. [x] Yes [] No  A referral was initiated to [facility]. [facility] does not have any beds. SNF search was expanded.

## 2020-10-23 NOTE — PROGRESS NOTES
Pt has become increasingly agitated over the afternoon. Does not comprehend anything in regards to her discharge plan. This RN along with others and our SW have explained that she is discharged today. She is worried about paying her bills. Attempted to contact a Christopher Costap ( for her property) at [phone]. No phone call returned,. Pt just keeps saying someone has to take me to my home to get my things. Im Haldol was given. Pt does not have clear thoughts and reasoning at this time. Unsafe for her to refuse discharge and go home.   Nuha Wilhelm RN

## 2020-10-23 NOTE — PROGRESS NOTES
Pt is refusing to go to [facility] directly from hospital; she is insisting on going home before going; she would like to gather for her passport, I.D.'s and car.      Pt is admit about not leaving [facility]

## 2020-10-23 NOTE — PROGRESS NOTES
Pt has no family in the [place] area. Her family lives in [place] and she does not have their contact information here. Her emergency contact in our system reflects somebody who works at [employer] where she used to live. She currently lives at [facility] which she indicates is not safe for her anymore. Pt knows that she needs assisted living. She has been unable to take care of herself - has not been eating on a regular basis. In care everywhere there is a Damien Gray listed, but pt denies knowing this person. I contacted her PCP office Dr. Alvarado Ortega  [phone]) who does not have any other contact for her. I spoke with [address] [place] [address] at [phone] who states that she was never admitted into their care because she refused. They stated that they do not have any contacts for her. Message left on a VM for [facility] - a care coordinator - to see if they have any different information.   Qing Snell RN

## 2020-10-23 NOTE — DISCHARGE INSTR - COC
Continuity of Care Form    Patient Name: Anthony Rivera   :  1945  MRN:  [id number]    Admit date:  10/19/2020  Discharge date:  10/23/2020    Code Status Order: Full Code   Advance Directives:   [facility] Documentation     Date/Time Healthcare Directive Type of Healthcare Directive Copy in [address] [place] [address] [address] Agent's Name Healthcare Agent's Phone Number    10/21/20 1149  No, patient does not have an advance directive for healthcare treatment -- -- -- -- --    10/20/20 1536  No, patient does not have an advance directive for healthcare treatment -- -- -- -- --          Admitting Physician:  Louis Flanagan MD  PCP: Chaparrita Thomas MD    Discharging Nurse: [facility] Unit/Room#: 4OG-6114/7828-78  Discharging Unit Phone Number: [phone]    Emergency Contact:   Extended Emergency Contact Information  Primary Emergency Contact: Gail Velez  Address: [address] [place] [address], [address] Phone: [phone]  Relation: Other  Secondary Emergency Contact: None,Given  Relation: Other    Past Surgical History:  Past Surgical History:   Procedure Laterality Date    HYSTERECTOMY         Immunization History:   Immunization History   Administered Date(s) Administered    Influenza, High Dose (Fluzone 65 yrs and older) 2009, 10/10/2016, 10/12/2017    Pneumococcal Conjugate 13-valent (Emirbnw34) 2009    Pneumococcal Polysaccharide (Szektwrwq30) 2019    Tdap (Boostrix, Adacel) 2019       Active Problems:  Patient Active Problem List   Diagnosis Code    Hypothyroidism (acquired) E03.9    Prediabetes R73.03    Anxiety F41.9    Postmenopausal Z78.0    Essential hypertension I10    Bacterial urinary tract infection N39.0, A49.9    Fall W19. [place]    Hypokalemia E87.6    UTI (urinary tract infection) N39.0    Encephalopathy due to infection G93.49, B99.9    Acute metabolic encephalopathy H89.06       Isolation/Infection:   Isolation          No Isolation        Patient Infection Status     Infection Onset Added Last Indicated Last Indicated By Review Planned Expiration Resolved Resolved By    None active    Resolved    COVID-19 Rule Out 10/19/20 10/19/20 10/19/20 COVID-19 (Ordered)   10/20/20 Rule-Out Test Resulted          Nurse Assessment:  Last Vital Signs: BP (!) 146/71   Pulse 67   Temp 97.9 °F (36.6 °C) (Temporal)   Resp 16   Ht 5' 6\" (1.676 m)   Wt 155 lb (70.3 kg)   SpO2 93%   BMI 25.02 kg/m²     Last documented pain score (0-10 scale): Pain Level: 0  Last Weight:   Wt Readings from Last 1 Encounters:   10/19/20 155 lb (70.3 kg)     Mental Status:  disoriented    IV Access:  - None    Nursing Mobility/ADLs:  Walking   Assisted  Transfer  Assisted  Bathing  Assisted  Dressing  Assisted  Toileting  Assisted  Feeding  Independent  Med Admin  Independent  Med Delivery   whole    Wound Care Documentation and Therapy:        Elimination:  Continence:   · Bowel: Yes  · Bladder: Yes  Urinary Catheter: None   Colostomy/Ileostomy/Ileal Conduit: No       Date of Last BM: 10/23/2020    Intake/Output Summary (Last 24 hours) at 10/23/2020 1638  Last data filed at 10/23/2020 1333  Gross per 24 hour   Intake 850 ml   Output --   Net 850 ml     I/O last 3 completed shifts: In: 65 [P.O.:840; I.V.:10]  Out: -     Safety Concerns:     History of Falls (last 30 days)    Impairments/Disabilities:      None    Nutrition Therapy:  Current Nutrition Therapy:   - Oral Diet:  General    Routes of Feeding: Oral  Liquids: Thin Liquids  Daily Fluid Restriction: no  Last Modified Barium Swallow with Video (Video Swallowing Test): not done    Treatments at the Time of Hospital Discharge:   Respiratory Treatments: None  Oxygen Therapy:  is not on home oxygen therapy.   Ventilator:    - No ventilator support    Rehab Therapies: Physical Therapy and Occupational Therapy  Weight Bearing Status/Restrictions: No weight bearing restirctions  Other Medical Equipment (for information only, NOT a DME order):  cane and walker - more stable right now with a walker  Other Treatments: None    Patient's personal belongings (please select all that are sent with patient):  with patient    RN SIGNATURE:  Electronically signed by Mckinley Odom RN on 10/23/20 at 5:08 PM EDT    CASE MANAGEMENT/SOCIAL WORK SECTION    Inpatient Status Date: 10-    Readmission Risk Assessment Score:  Readmission Risk              Risk of Unplanned Readmission:        15           Discharging to Facility/ Agency   Name: [facility]ild[facility] Taylor  Phone: [phone]         Fax: [phone]        Electronically signed by Kenneth Thomas MSW, LSW on 10/24/2020 at 10:54 AM    PHYSICIAN SECTION    Prognosis: Fair    Condition at Discharge: Stable    Rehab Potential (if transferring to Rehab): Good    Recommended Labs or Other Treatments After Discharge:     Per SNF    Physician Certification: I certify the above information and transfer of Hailey Duarte  is necessary for the continuing treatment of the diagnosis listed and that she requires [facility] for less 30 days.      Update Admission H&P: No change in H&P    PHYSICIAN SIGNATURE:  Electronically signed by Corin Ken MD on 10/23/20 at 12:26 PM EDT

## 2020-10-23 NOTE — PLAN OF CARE
Problem: Falls - Risk of:  Goal: Will remain free from falls  Description: Will remain free from falls  10/23/2020 0254 by Skylar Tesfaye RN  Outcome: Ongoing     Problem: Falls - Risk of:  Goal: Absence of physical injury  Description: Absence of physical injury  10/23/2020 0254 by Skylar Tesfaye RN  Outcome: Ongoing     Problem: Skin Integrity:  Goal: Will show no infection signs and symptoms  Description: Will show no infection signs and symptoms  10/23/2020 0254 by Skylar Tesfaye RN  Outcome: Ongoing     Problem: Skin Integrity:  Goal: Absence of new skin breakdown  Description: Absence of new skin breakdown  10/23/2020 0254 by Skylar Tesfaye RN  Outcome: Ongoing     Problem: Restraint Use - Nonviolent/Non-Self-Destructive Behavior:  Goal: Absence of restraint indications  Description: Absence of restraint indications  10/23/2020 0254 by Skylar Tesfaye RN  Outcome: Ongoing     Problem: Restraint Use - Nonviolent/Non-Self-Destructive Behavior:  Goal: Absence of restraint-related injury  Description: Absence of restraint-related injury  10/23/2020 0254 by Skylar Tesfaye RN  Outcome: Ongoing     Problem: Musculor/Skeletal Functional Status  Goal: Highest potential functional level  10/23/2020 0254 by Skylar Tesfaye RN  Outcome: Ongoing     Problem: Musculor/Skeletal Functional Status  Goal: Absence of falls  10/23/2020 0254 by Skylar Tesfaye RN  Outcome: Ongoing

## 2020-10-24 VITALS
BODY MASS INDEX: 24.91 KG/M2 | SYSTOLIC BLOOD PRESSURE: 181 MMHG | DIASTOLIC BLOOD PRESSURE: 74 MMHG | HEIGHT: 66 IN | TEMPERATURE: 97.2 F | WEIGHT: 155 LBS | OXYGEN SATURATION: 94 % | HEART RATE: 73 BPM | RESPIRATION RATE: 16 BRPM

## 2020-10-24 PROCEDURE — 6370000000 HC RX 637 (ALT 250 FOR IP): Performed by: INTERNAL MEDICINE

## 2020-10-24 RX ORDER — CEPHALEXIN 250 MG/1
250 CAPSULE ORAL 4 TIMES DAILY
Qty: 12 CAPSULE | Refills: 0 | Status: SHIPPED | OUTPATIENT
Start: 2020-10-24 | End: 2020-10-27

## 2020-10-24 RX ORDER — QUETIAPINE FUMARATE 25 MG/1
12.5 TABLET, FILM COATED ORAL NIGHTLY
Qty: 15 TABLET | Refills: 0 | Status: SHIPPED | OUTPATIENT
Start: 2020-10-24 | End: 2021-02-26

## 2020-10-24 RX ORDER — LEVOTHYROXINE SODIUM 0.05 MG/1
50 TABLET ORAL DAILY
Qty: 30 TABLET | Refills: 0 | Status: SHIPPED | OUTPATIENT
Start: 2020-10-24 | End: 2021-02-26

## 2020-10-24 ASSESSMENT — PAIN SCALES - GENERAL
PAINLEVEL_OUTOF10: 0

## 2020-10-24 NOTE — PROGRESS NOTES
Informed patient that I was not able to get a hold of anyone in the office, but spoke to someone that is part of the emergency line for lock outs. Informed patient that this number would have to be called to get her into her house. Patient still insist on going home, and will call the number or have transport call if able.

## 2020-10-24 NOTE — PROGRESS NOTES
Spoke with Saloni with the apartment and she stated she was sending a message to the maintenance crew that unlocks the doors. Gave her my name and number if any issues.

## 2020-10-24 NOTE — PROGRESS NOTES
Pt refused labs & synthroid this morning; Rocephin IV was not given r/t lack of IV access. Christal Lipscomb MD perfect served at [place] [address] requesting PO substitution for Rocephin. Hand-off to PATRICK Padron; pt indicated to PATRICK Padron that she might take the synthroid; pt is concerned that her primary care physician is not here to give her the medication.

## 2020-10-24 NOTE — PROGRESS NOTES
Writer spoke to Gilma  regarding POC for the patient and writers concerns for patient to discharge home by self. Gilma to reach out to [facility].

## 2020-10-24 NOTE — ACP (ADVANCE CARE PLANNING)
Advanced Care Planning Note. Purpose of Encounter: Advanced care planning in light of UTI  Parties In Attendance: Patient  Decisional Capacity: Yes  Subjective: Patient denies CP, SOB, HA or abdominal pain  Objective: Cr 0.7 on 10/23  Goals of Care Determination: Patient wants full support (CPR, vent, surgery, HD, trach, PEG)  Plan:  Home PT/OT/VNS/HHA. Abx for UTI. Code Status: Full code   Time spent on Advanced care Plannin minutes  Advanced Care Planning Documents: Completed advanced directives on chart, children share the POA.     Cornel Walker MD  10/24/2020 8:17 AM

## 2020-10-24 NOTE — PROGRESS NOTES
Patient keeps insisting on getting dressed to go home to pay bills; calling out frequently. Patient states she lives at home by herself. Patient refused transfer to SNF last night per night shift RN.

## 2020-10-24 NOTE — PROGRESS NOTES
Patient got dressed with Asa Contreras. Patient's clothes wet and in a bad; patients dressed in clean hospital gown and pants. Patient states she is able to get into her apartment by asking the people in the office to open her apartment. Attempted to call Ra Orr/office [phone]. Left voicemail. Call then made to the  emergency line - [phone]. They stated that if no one is in the office when she returns home to let her in, she will need to call the [phone] when she arrives at her property.

## 2020-10-24 NOTE — PROGRESS NOTES
Transport came to take pt to [facility]; pt refused; she is insistent that she go home to [facility] first to gather her passport, ID's, pay her car insurance, make arrangements for her car, and pay rent; Pt understands that transport cannot take her home and then to SNF; Pt stated she can \"take a taxi home and then go to [email or web address]. \" Pt was informed that it is unsafe for her to return home without first going to SNF for strengthening    All efforts were met with the pt deciding not to go directly to SNF. Pt is alert and oriented to self/person, place, time & situation. Pt does identify the place as a \"[facility]\". Attempted to call [facility] - no answer and the rec'd a generic VM not stating institutions name.     Hospitalist sent [facility]

## 2020-10-24 NOTE — PROGRESS NOTES
Bruises in Children: Care Instructions  Your Care Instructions    Bruises occur when small blood vessels under the skin tear or rupture, most often from a twist, bump, or fall. Blood leaks into tissues under the skin and causes a black-and-blue spot that often turns colors, including purplish black, reddish blue, or yellowish green, as the bruise heals. Bruises hurt, but most are not serious and will go away on their own within 2 to 4 weeks. Sometimes, gravity causes them to spread down the body. A leg bruise usually will take longer to heal than a bruise on the face or arms. Follow-up care is a key part of your child's treatment and safety. Be sure to make and go to all appointments, and call your doctor if your child is having problems. It's also a good idea to know your child's test results and keep a list of the medicines your child takes. How can you care for your child at home? · Give pain medicines exactly as directed. ¨ If the doctor gave your child a prescription medicine for pain, give it as prescribed. ¨ If your child is not taking a prescription pain medicine, ask the doctor if your child can take an over-the-counter medicine. ¨ Do not give your child two or more pain medicines at the same time unless the doctor told you to. Many pain medicines have acetaminophen, which is Tylenol. Too much acetaminophen (Tylenol) can be harmful. · Put ice or a cold pack on the area for 10 to 20 minutes at a time. Put a thin cloth between the ice and your child's skin. · If you can, prop up the bruised area on pillows as much as possible for the next few days. Try to keep the bruise above the level of your child's heart. When should you call for help? Call your doctor now or seek immediate medical care if:  · Your child has signs of infection, such as:  ¨ Increased pain, swelling, warmth, or redness. ¨ Red streaks leading from the bruise. ¨ Pus draining from the bruise. ¨ A fever.   · Your child has a Dr. Ariana Bowman will speak with Dr. Rachid Anderson to ensure that pt requests are respected/relayed in the morning. bruise on the leg and signs of a blood clot, such as:  ¨ Increasing redness and swelling along with warmth, tenderness, and pain in the bruised area. ¨ Pain in the calf, back of the knee, thigh, or groin. ¨ Redness and swelling in the leg or groin. · Your child's pain gets worse. Watch closely for changes in your child's health, and be sure to contact your doctor if:  · Your child does not get better as expected. Where can you learn more? Go to http://cyndi-vik.info/. Enter N694 in the search box to learn more about \"Bruises in Children: Care Instructions. \"  Current as of: May 27, 2016  Content Version: 11.2  © 8362-6543 [employer]. Care instructions adapted under license by [employer] (which disclaims liability or warranty for this information). If you have questions about a medical condition or this instruction, always ask your healthcare professional. [place] [address] any warranty or liability for your use of this information.

## 2020-10-24 NOTE — PROGRESS NOTES
Pt is very concerned about going home to pay rent; pt stated \"the rent was due on the 14th; I hope they can forgive me. \"    Pt was reassured that her earlier request was relayed to Dr. Lazarus Hancock, who stated he would communicate with Dr. Jevon Cagle in morning. Pt is agreeable to a Spiritual Consult.

## 2020-10-24 NOTE — CARE COORDINATION
Called [facility] admissions, [phone] and left a voicemail to see if pt can still admit today. Called pt's emergency contact, [address] and it's [facility]'s clinic- didn't leave voicemail. Susan Lee at [address] to see if she has family's phone number to assist w/ bill pay- left voicemail. Await calls back.  Maninder Olson, MSW, LSW

## 2020-10-24 NOTE — PROGRESS NOTES
[facility] called stating they were at [facility] and that she doesn't live at that address. The address listed in the system is correct. Stephanie Crump

## 2020-10-24 NOTE — PROGRESS NOTES
ETA for transport still remains for 230 pm. Discharge instructions and prescriptions reviewed with pt. Pt allowed opportunity to ask questions and verbalized understanding. Confirmed with patient again that she feels safe going home and waiting for the emergency maintenance crew on the weekend to come unlock her door to her apartment. Patient went on to say that she cannot go to a facility like planned yesterday because she is afraid of eviction and her important papers are at the home. Patient is aware that home care has been consulted and will be reaching out to her.

## 2020-10-24 NOTE — PLAN OF CARE
Problem: Falls - Risk of:  Goal: Will remain free from falls  Description: Will remain free from falls  Outcome: Ongoing  Goal: Absence of physical injury  Description: Absence of physical injury  Outcome: Ongoing     Problem: Skin Integrity:  Goal: Will show no infection signs and symptoms  Description: Will show no infection signs and symptoms  Outcome: Ongoing  Goal: Absence of new skin breakdown  Description: Absence of new skin breakdown  Outcome: Ongoing     Problem: Anxiety:  Goal: Level of anxiety will decrease  Description: Level of anxiety will decrease  Outcome: Ongoing  Note: Pt uses prayer to cope and is able to express needs/wishes; pt benefits from reinforcement that her wishes are heard and being respected; pt stated \"I want to go home before going to skilled nursing care; I need to get my passport, pay care insurance, pay rent, make arrangements for my car that is sitting in the parking lot. \" Pt knows that it is not safe for her live at home by herself without first receiving physical therapy; pt expresses need to pray and appreciates praying with others

## 2020-10-24 NOTE — CARE COORDINATION
Per RN, pt to return home / [facility] today as MD reports he is not going to make pt go SNF. Called [facility] to arrange wheel chair Malena Victor transport to home at [facility] apts: [address], [place], [address] for 2:30 p.m. today. Referral faxed to [facility].      Aron Swift, MSW, LSW

## 2020-10-24 NOTE — DISCHARGE SUMMARY
Hospital Medicine Discharge Summary    Patient: Massimo Cifuentes     Gender: female  : 1945   Age: 66 y.o. MRN: [id number]    Admitting Physician: Susie Burroughs MD  Discharge Physician: Gwyn Riley MD     Code Status: Full Code     Admit Date: 10/19/2020   Discharge Date:   10/24/20    Disposition:  Home    Discharge Diagnoses: Active Hospital Problems    Diagnosis Date Noted    Acute metabolic encephalopathy [K11.69]     Encephalopathy due to infection [G93.49, B99.9] 10/21/2020    UTI (urinary tract infection) [N39.0] 10/20/2020    Bacterial urinary tract infection [N39.0, A49.9] 10/19/2020    Hypothyroidism (acquired) [E03.9]        Follow-up appointments:  one week    Outpatient to do list: F/U with PCP and Neuro    Condition at Discharge:  Stable    Hospital Course:   65 yo F who came to ER with fall, syncope and AMS. Admitted as inpatient for acute metabolic encephalopathy with UTI. Found to have hypernatremia. Started on IVF and IV Abx. CT head with no acute findings. CT C spine shows Multilevel spondylosis of the lumbar spine, including moderate central stenosis at L3-4 and L4-5.   -Telemetry monitoring  -Echo shows EF 60-65% with no significant valvular disease  -Appreciate Neurology consultation. EEG shows encephalopathy.  No further work-up. -Appreciate EP evaluation.  No further work-up. -PT OT recommend SNF. She refused SNF and asked to be discharged to home with home PT/OT/VNS/HHA. Arranged for home PT/OT/VNS/HHA/SW upon DC    Started on Seroquel qhs for . Will finish course of PO Keflex at home for Klebsiella UTI. Started on Synthroid for hypothyroidism. PCP should repeat TSH in 6-8 weeks and adjust Synthroid if needed. Compliance must be questioned by PCP. Attempted to contact family without success. If she is readmitted to hospital, [facility].     Remains full code.       Discharge Medications:   Current Discharge Medication List      START taking these medications    Details   QUEtiapine (SEROQUEL) 25 MG tablet Take 0.5 tablets by mouth nightly  Qty: 15 tablet, Refills: 0      cephALEXin (KEFLEX) 250 MG capsule Take 1 capsule by mouth 4 times daily for 3 days  Qty: 12 capsule, Refills: 0      levothyroxine (SYNTHROID) 50 MCG tablet Take 1 tablet by mouth Daily  Qty: 30 tablet, Refills: 0           Current Discharge Medication List        Current Discharge Medication List        Current Discharge Medication List          Discharge Exam:    BP (!) 152/76   Pulse 70   Temp 96.2 °F (35.7 °C) (Oral)   Resp 14   Ht 5' 6\" (1.676 m)   Wt 155 lb (70.3 kg)   SpO2 93%   BMI 25.02 kg/m²   General appearance:  NAD  HEENT:   Normal cephalic, atraumatic, moist mucous membranes, no oropharyngeal erythema or exudate  Neck: Supple, trachea midline, no anterior cervical or SC LAD  Heart[de-identified] Normal s1/s2, RRR, no murmurs, gallops, or rubs. No leg edema  Lungs:  No use of accessory musclesNormal respiratory effort. Clear to auscultation, bilaterally without Rales/Wheezes/Rhonchi. Abdomen: Soft, non-tender, non-distended, bowel sounds present, no masses  Musculoskeletal:  No clubbing, no cyanosis, no edema  Skin: No lesion or masses  Neurologic:  Neurovascularly intact without any focal sensory/motor deficits. Grossly non-focal.  Psychiatric:  A & O x3. No SI/HI/Hallucinations  Neuro: Grossly intact, moves all four extremities     Labs:  For convenience and continuity at follow-up the following most recent labs are provided:    Lab Results   Component Value Date    WBC 5.2 10/23/2020    HGB 13.4 10/23/2020    HCT 39.8 10/23/2020    MCV 96.7 10/23/2020     10/23/2020     10/23/2020    K 3.8 10/23/2020    K 3.8 10/23/2020     10/23/2020    CO2 26 10/23/2020    BUN 20 10/23/2020    CREATININE 0.7 10/23/2020    CALCIUM 8.7 10/23/2020    PHOS 4.0 10/23/2020    ALKPHOS 63 10/23/2020    ALT 22 10/23/2020    AST 29 10/23/2020 BILITOT 0.6 10/23/2020    LABALBU 3.6 10/23/2020    LDLCALC 81 01/31/2020    TRIG 204 01/31/2020     Lab Results   Component Value Date    INR 0.99 10/23/2020    INR 1.00 10/22/2020    INR 0.90 10/21/2020       Radiology:  Ct Head Wo Contrast    Result Date: 10/22/2020  EXAMINATION: CT OF THE HEAD WITHOUT CONTRAST  10/22/2020 8:00 am TECHNIQUE: CT of the head was performed without the administration of intravenous contrast. Dose modulation, iterative reconstruction, and/or weight based adjustment of the mA/kV was utilized to reduce the radiation dose to as low as reasonably achievable. COMPARISON: 10/19/2020 HISTORY: ORDERING SYSTEM PROVIDED HISTORY: fell, hitting her head TECHNOLOGIST PROVIDED HISTORY: Reason for exam:->fell, hitting her head Has a \"code stroke\" or \"stroke alert\" been called? ->No Reason for Exam: fall, hitting head Acuity: Acute Type of Exam: Initial FINDINGS: BRAIN/VENTRICLES: There is no acute intracranial hemorrhage, mass effect or midline shift. No abnormal extra-axial fluid collection. The gray-white differentiation is maintained without evidence of an acute infarct. There is prominence of the ventricles and sulci due to global parenchymal volume loss. There are nonspecific areas of hypoattenuation within the periventricular and subcortical white matter, which likely represent chronic microvascular ischemic change. ORBITS: The visualized portion of the orbits demonstrate no acute abnormality. SINUSES: The visualized paranasal sinuses and mastoid air cells demonstrate no acute abnormality. SOFT TISSUES/SKULL: No acute abnormality of the visualized skull or soft tissues. No acute intracranial abnormality.      Ct Head Wo Contrast    Result Date: 10/19/2020  EXAMINATION: CT OF THE HEAD WITHOUT CONTRAST  10/19/2020 1:29 pm TECHNIQUE: CT of the head was performed without the administration of intravenous contrast. Dose modulation, iterative reconstruction, and/or weight based adjustment of the mA/kV was utilized to reduce the radiation dose to as low as reasonably achievable. COMPARISON: None. HISTORY: ORDERING SYSTEM PROVIDED HISTORY: fall TECHNOLOGIST PROVIDED HISTORY: Reason for exam:->fall Has a \"code stroke\" or \"stroke alert\" been called? ->No Reason for Exam: Fall (Arrived by EMS rt fall sustained at home. Denies hitting her head or blood thinners. Landed on her bottom; no pain reported. ) Acuity: Acute Type of Exam: Initial FINDINGS: BRAIN/VENTRICLES: There is no acute intracranial hemorrhage, mass effect or midline shift. No abnormal extra-axial fluid collection. There is mild diffuse atrophy. There are moderate periventricular white matter hypodensities. The gray-white differentiation is otherwise maintained without evidence of an acute infarct. There is no evidence of hydrocephalus. ORBITS: The visualized portion of the orbits demonstrate no acute abnormality. SINUSES: The visualized paranasal sinuses and mastoid air cells demonstrate no acute abnormality. SOFT TISSUES/SKULL:  No acute abnormality of the visualized skull or soft tissues. No acute intracranial abnormality. Atrophy and white matter changes consistent with chronic small vessel ischemic disease. Ct Cervical Spine Wo Contrast    Result Date: 10/22/2020  EXAMINATION: CT OF THE CERVICAL SPINE WITHOUT CONTRAST 10/22/2020 8:00 am TECHNIQUE: CT of the cervical spine was performed without the administration of intravenous contrast. Multiplanar reformatted images are provided for review. Dose modulation, iterative reconstruction, and/or weight based adjustment of the mA/kV was utilized to reduce the radiation dose to as low as reasonably achievable. COMPARISON: None. HISTORY: ORDERING SYSTEM PROVIDED HISTORY: fall TECHNOLOGIST PROVIDED HISTORY: Reason for exam:->fall Reason for Exam: fell, hitting her head Acuity: Acute Type of Exam: Initial neck pain.  FINDINGS: BONES/ALIGNMENT: There is no acute fracture or traumatic FALLING TECHNOLOGIST PROVIDED HISTORY: Reason for exam:->fall Reason for Exam: Fall (Arrived by EMS rt fall sustained at home. Denies hitting her head or blood thinners. Landed on her bottom; no pain reported. ) Acuity: Acute Type of Exam: Initial FINDINGS: BONES/ALIGNMENT: Vertebral body height of is preserved. No fractures are identified. DEGENERATIVE CHANGES: From L2-3 to L4-5 there are mild posterior disc bulges. Facet arthropathy is also noted at multiple levels, most prominently L3-4 and L4-5 bilaterally. There is moderate central stenosis at L3-4 and L4-5 as well. SOFT TISSUES/RETROPERITONEUM: No paraspinal mass is seen. No acute fracture or subluxation of the lumbar spine. Multilevel spondylosis of the lumbar spine, including moderate central stenosis at L3-4 and L4-5. Xr Chest Portable    Result Date: 10/19/2020  EXAMINATION: ONE XRAY VIEW OF THE CHEST 10/19/2020 1:23 pm COMPARISON: None. HISTORY: ORDERING SYSTEM PROVIDED HISTORY: syncope TECHNOLOGIST PROVIDED HISTORY: Reason for exam:->syncope FINDINGS: Cardiac silhouette is enlarged. No pneumothorax. No pleural effusion. Diffuse interstitial prominence especially in the bases. No acute abnormality. Findings as above which may be related to congestive heart failure and mild edema. Viral infection could also have similar appearance. The patient was seen and examined on day of discharge and this discharge summary is in conjunction with any daily progress note from day of discharge. Time Spent on discharge is 45 minutes  in the examination, evaluation, counseling and review of medications and discharge plan. Note that more than 30 minutes was spent in preparing discharge papers, discussing discharge with patient, medication review, etc.       Signed:    Sarah Schmidt MD   10/24/2020      Thank you Benji Powers MD for the opportunity to be involved in this patient's care.  If you have any questions or concerns please feel free to contact me at [facility]

## 2020-10-24 NOTE — PROGRESS NOTES
Spoke with Saloni at [phone] and provided her with the address of [place] [address], [address] [address] and to please have someone go unlock patient's door. Saloni stated she was calling the .

## 2020-10-25 LAB
BLOOD CULTURE, ROUTINE: NORMAL
CULTURE, BLOOD 2: NORMAL

## 2020-11-03 NOTE — PROGRESS NOTES
the ER by EMS secondary to fall, found to have UTI and Lactic   acid 3.2. Clinical Indicators: UTI, + Klebsiella pneumoniae, Metabolic encephalopathy,   Lactic acid 3.2, 2.8, confusion agitation  Treatment: IV Rocephin, Cefepime, Haldol, Ativan, fall precautions, Neurology   and Cardiology consults, monitoring    Thank you. Please contact me if you have any questions @ [email or web address],   Russell Cast RN, CDS  Options provided:  -- Sepsis, present on admission  -- Sepsis, now resolved,  -- No Sepsis,  -- Sepsis was ruled out  -- Other - I will add my own diagnosis  -- Disagree - Not applicable / Not valid  -- Disagree - Clinically unable to determine / Unknown  -- Refer to Clinical Documentation Reviewer    PROVIDER RESPONSE TEXT:    This patient has sepsis which was present on admission.     Query created by: Venia Denver on 10/23/2020 10:33 AM      Electronically signed by:  Gisele Smith MD 11/3/2020 2:34 PM

## 2020-11-18 ENCOUNTER — APPOINTMENT (OUTPATIENT)
Dept: CT IMAGING | Age: 75
End: 2020-11-18
Payer: MEDICARE

## 2020-11-18 ENCOUNTER — APPOINTMENT (OUTPATIENT)
Dept: GENERAL RADIOLOGY | Age: 75
End: 2020-11-18
Payer: MEDICARE

## 2020-11-18 ENCOUNTER — HOSPITAL ENCOUNTER (OUTPATIENT)
Age: 75
Setting detail: OBSERVATION
Discharge: HOME | End: 2020-11-19
Attending: EMERGENCY MEDICINE | Admitting: HOSPITALIST
Payer: MEDICARE
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
## 2020-11-18 LAB
A/G RATIO: 1.3 (ref 1.1–2.2)
ALBUMIN SERPL-MCNC: 4.3 G/DL (ref 3.4–5)
ALP BLD-CCNC: 68 U/L (ref 40–129)
ALT SERPL-CCNC: 16 U/L (ref 10–40)
ANION GAP SERPL CALCULATED.3IONS-SCNC: 12 MMOL/L (ref 3–16)
APTT: 26.2 SEC (ref 24.2–36.2)
AST SERPL-CCNC: 22 U/L (ref 15–37)
BASOPHILS ABSOLUTE: 0 K/UL (ref 0–0.2)
BASOPHILS RELATIVE PERCENT: 0.7 %
BILIRUB SERPL-MCNC: 0.9 MG/DL (ref 0–1)
BILIRUBIN URINE: NEGATIVE
BLOOD, URINE: NEGATIVE
BUN BLDV-MCNC: 17 MG/DL (ref 7–20)
CALCIUM SERPL-MCNC: 9.3 MG/DL (ref 8.3–10.6)
CHLORIDE BLD-SCNC: 108 MMOL/L (ref 99–110)
CLARITY: CLEAR
CO2: 21 MMOL/L (ref 21–32)
COLOR: YELLOW
CREAT SERPL-MCNC: <0.5 MG/DL (ref 0.6–1.2)
EOSINOPHILS ABSOLUTE: 0 K/UL (ref 0–0.6)
EOSINOPHILS RELATIVE PERCENT: 0.6 %
EPITHELIAL CELLS, UA: 1 /HPF (ref 0–5)
GFR AFRICAN AMERICAN: >60
GFR NON-AFRICAN AMERICAN: >60
GLOBULIN: 3.4 G/DL
GLUCOSE BLD-MCNC: 110 MG/DL (ref 70–99)
GLUCOSE URINE: NEGATIVE MG/DL
HCT VFR BLD CALC: 43.1 % (ref 36–48)
HEMOGLOBIN: 14.5 G/DL (ref 12–16)
HYALINE CASTS: 1 /LPF (ref 0–8)
INR BLD: 1 (ref 0.86–1.14)
KETONES, URINE: 15 MG/DL
LACTIC ACID, SEPSIS: 1.6 MMOL/L (ref 0.4–1.9)
LEUKOCYTE ESTERASE, URINE: NEGATIVE
LYMPHOCYTES ABSOLUTE: 2.5 K/UL (ref 1–5.1)
LYMPHOCYTES RELATIVE PERCENT: 39.1 %
MCH RBC QN AUTO: 32.8 PG (ref 26–34)
MCHC RBC AUTO-ENTMCNC: 33.6 G/DL (ref 31–36)
MCV RBC AUTO: 97.4 FL (ref 80–100)
MICROSCOPIC EXAMINATION: YES
MONOCYTES ABSOLUTE: 0.4 K/UL (ref 0–1.3)
MONOCYTES RELATIVE PERCENT: 6.6 %
NEUTROPHILS ABSOLUTE: 3.4 K/UL (ref 1.7–7.7)
NEUTROPHILS RELATIVE PERCENT: 53 %
NITRITE, URINE: NEGATIVE
PDW BLD-RTO: 13.8 % (ref 12.4–15.4)
PH UA: 6 (ref 5–8)
PLATELET # BLD: 149 K/UL (ref 135–450)
PMV BLD AUTO: 8.7 FL (ref 5–10.5)
POTASSIUM SERPL-SCNC: 3.4 MMOL/L (ref 3.5–5.1)
PRO-BNP: 90 PG/ML (ref 0–449)
PROTEIN UA: ABNORMAL MG/DL
PROTHROMBIN TIME: 11.6 SEC (ref 10–13.2)
RBC # BLD: 4.42 M/UL (ref 4–5.2)
RBC UA: 1 /HPF (ref 0–4)
SARS-COV-2, NAAT: NOT DETECTED
SODIUM BLD-SCNC: 141 MMOL/L (ref 136–145)
SPECIFIC GRAVITY UA: 1.02 (ref 1–1.03)
TOTAL CK: 122 U/L (ref 26–192)
TOTAL PROTEIN: 7.7 G/DL (ref 6.4–8.2)
TROPONIN: <0.01 NG/ML
URINE REFLEX TO CULTURE: ABNORMAL
URINE TYPE: ABNORMAL
UROBILINOGEN, URINE: 0.2 E.U./DL
WBC # BLD: 6.5 K/UL (ref 4–11)
WBC UA: 1 /HPF (ref 0–5)

## 2020-11-18 PROCEDURE — 87040 BLOOD CULTURE FOR BACTERIA: CPT

## 2020-11-18 PROCEDURE — 83605 ASSAY OF LACTIC ACID: CPT

## 2020-11-18 PROCEDURE — 6370000000 HC RX 637 (ALT 250 FOR IP): Performed by: HOSPITALIST

## 2020-11-18 PROCEDURE — 2580000003 HC RX 258: Performed by: HOSPITALIST

## 2020-11-18 PROCEDURE — 85025 COMPLETE CBC W/AUTO DIFF WBC: CPT

## 2020-11-18 PROCEDURE — G0378 HOSPITAL OBSERVATION PER HR: HCPCS

## 2020-11-18 PROCEDURE — 80053 COMPREHEN METABOLIC PANEL: CPT

## 2020-11-18 PROCEDURE — 81001 URINALYSIS AUTO W/SCOPE: CPT

## 2020-11-18 PROCEDURE — U0002 COVID-19 LAB TEST NON-CDC: HCPCS

## 2020-11-18 PROCEDURE — 82550 ASSAY OF CK (CPK): CPT

## 2020-11-18 PROCEDURE — 72125 CT NECK SPINE W/O DYE: CPT

## 2020-11-18 PROCEDURE — 83880 ASSAY OF NATRIURETIC PEPTIDE: CPT

## 2020-11-18 PROCEDURE — 93005 ELECTROCARDIOGRAM TRACING: CPT | Performed by: EMERGENCY MEDICINE

## 2020-11-18 PROCEDURE — 85610 PROTHROMBIN TIME: CPT

## 2020-11-18 PROCEDURE — 36415 COLL VENOUS BLD VENIPUNCTURE: CPT

## 2020-11-18 PROCEDURE — 85730 THROMBOPLASTIN TIME PARTIAL: CPT

## 2020-11-18 PROCEDURE — 70450 CT HEAD/BRAIN W/O DYE: CPT

## 2020-11-18 PROCEDURE — 84484 ASSAY OF TROPONIN QUANT: CPT

## 2020-11-18 PROCEDURE — 71045 X-RAY EXAM CHEST 1 VIEW: CPT

## 2020-11-18 PROCEDURE — 99284 EMERGENCY DEPT VISIT MOD MDM: CPT

## 2020-11-18 RX ORDER — ACETAMINOPHEN 325 MG/1
650 TABLET ORAL EVERY 6 HOURS PRN
Status: DISCONTINUED | OUTPATIENT
Start: 2020-11-18 | End: 2020-11-19 | Stop reason: HOSPADM

## 2020-11-18 RX ORDER — ACETAMINOPHEN 650 MG/1
650 SUPPOSITORY RECTAL EVERY 6 HOURS PRN
Status: DISCONTINUED | OUTPATIENT
Start: 2020-11-18 | End: 2020-11-19 | Stop reason: HOSPADM

## 2020-11-18 RX ORDER — QUETIAPINE FUMARATE 25 MG/1
12.5 TABLET, FILM COATED ORAL NIGHTLY
Status: DISCONTINUED | OUTPATIENT
Start: 2020-11-18 | End: 2020-11-19 | Stop reason: HOSPADM

## 2020-11-18 RX ORDER — SODIUM CHLORIDE 0.9 % (FLUSH) 0.9 %
10 SYRINGE (ML) INJECTION EVERY 12 HOURS SCHEDULED
Status: DISCONTINUED | OUTPATIENT
Start: 2020-11-18 | End: 2020-11-19 | Stop reason: HOSPADM

## 2020-11-18 RX ORDER — PROMETHAZINE HYDROCHLORIDE 25 MG/1
12.5 TABLET ORAL EVERY 6 HOURS PRN
Status: DISCONTINUED | OUTPATIENT
Start: 2020-11-18 | End: 2020-11-19 | Stop reason: HOSPADM

## 2020-11-18 RX ORDER — SODIUM CHLORIDE 0.9 % (FLUSH) 0.9 %
10 SYRINGE (ML) INJECTION PRN
Status: DISCONTINUED | OUTPATIENT
Start: 2020-11-18 | End: 2020-11-19 | Stop reason: HOSPADM

## 2020-11-18 RX ORDER — POLYETHYLENE GLYCOL 3350 17 G/17G
17 POWDER, FOR SOLUTION ORAL DAILY PRN
Status: DISCONTINUED | OUTPATIENT
Start: 2020-11-18 | End: 2020-11-19 | Stop reason: HOSPADM

## 2020-11-18 RX ORDER — ONDANSETRON 2 MG/ML
4 INJECTION INTRAMUSCULAR; INTRAVENOUS EVERY 6 HOURS PRN
Status: DISCONTINUED | OUTPATIENT
Start: 2020-11-18 | End: 2020-11-19 | Stop reason: HOSPADM

## 2020-11-18 RX ORDER — LEVOTHYROXINE SODIUM 0.03 MG/1
50 TABLET ORAL DAILY
Status: DISCONTINUED | OUTPATIENT
Start: 2020-11-19 | End: 2020-11-19 | Stop reason: HOSPADM

## 2020-11-18 RX ADMIN — Medication 10 ML: at 21:56

## 2020-11-18 RX ADMIN — QUETIAPINE FUMARATE 12.5 MG: 25 TABLET ORAL at 21:55

## 2020-11-18 ASSESSMENT — PAIN SCALES - GENERAL: PAINLEVEL_OUTOF10: 0

## 2020-11-18 NOTE — ED PROVIDER NOTES
[facility]        Pt Name: Hailey Duarte  MRN: [id number]  [place] 1945  Date of evaluation: 11/18/2020  Provider: Carli Loja PA-C  PCP: Venice Tubbs MD     I have seen and evaluated this patient with my supervising physician Laura Hernandez MD.    [address] [place] [address]       Chief Complaint   Patient presents with   Ple[place]     pt in by squad from home for fall- pt states she fell two weeks ago- states she is in independent living at a facility- states she needs food to survive which is causing her to be weak. pt states no injury from fall, just difficulty getting back up. HISTORY OF PRESENT ILLNESS   (Location, Timing/Onset, Context/Setting, Quality, Duration, Modifying Factors, Severity, Associated Signs and Symptoms)  Note limiting factors. Hailey Duarte is a 66 y.o. female who presents to the emergency department today for evaluation for altered mental status. The patient is from independent living, and the patient tells me that she fell today. The patient initially told me that she tripped, and fell. She did hit her head. She is unsure if she lost any consciousness. I asked again why the patient fell and she states that \"it is because of lack of food\". She tells me that she then believes that she actually fell 2 weeks ago. She states that she was last eating this morning. The patient is only alert to person. The patient does not report any chest pain or shortness of breath. She has not had any recent illnesses. No other history is able to be obtained at this time    Nursing Notes were all reviewed and agreed with or any disagreements were addressed in the HPI. REVIEW OF SYSTEMS    (2-9 systems for level 4, 10 or more for level 5)     Review of Systems   Unable to perform ROS: Mental status change       Positives and Pertinent negatives as per HPI.   Except as noted above in the ROS, all other systems were reviewed and negative. PAST MEDICAL HISTORY     Past Medical History:   Diagnosis Date    Anxiety     Hypothyroidism     Irritable bowel syndrome     Prediabetes          SURGICAL HISTORY     Past Surgical History:   Procedure Laterality Date    HYSTERECTOMY           CURRENTMEDICATIONS       Previous Medications    LEVOTHYROXINE (SYNTHROID) 50 MCG TABLET    Take 1 tablet by mouth Daily    QUETIAPINE (SEROQUEL) 25 MG TABLET    Take 0.5 tablets by mouth nightly         ALLERGIES     Patient has no known allergies. FAMILYHISTORY       Family History   Problem Relation Age of Onset    Thyroid Disease Sister     No Known Problems Sister     No Known Problems Son     No Known Problems Son     No Known Problems Daughter           SOCIAL HISTORY       Social History     Tobacco Use    Smoking status: Never Smoker    Smokeless tobacco: Never Used   Substance Use Topics    Alcohol use: No    Drug use: Not on file       SCREENINGS             PHYSICAL EXAM    (up to 7 for level 4, 8 or more for level 5)     ED Triage Vitals   BP Temp Temp Source Pulse Resp SpO2 Height Weight   11/18/20 1343 11/18/20 1345 11/18/20 1343 11/18/20 1343 11/18/20 1343 11/18/20 1343 11/18/20 1343 11/18/20 1343   (!) 144/64 97.2 °F (36.2 °C) Oral 67 16 95 % 5' 6\" (1.676 m) 155 lb (70.3 kg)       Physical Exam  Vitals signs and nursing note reviewed. Constitutional:       Appearance: She is well-developed. She is not diaphoretic. HENT:      Head: Normocephalic and atraumatic. Right Ear: External ear normal.      Left Ear: External ear normal.      Nose: Nose normal.   Eyes:      General:         Right eye: No discharge. Left eye: No discharge. Neck:      Musculoskeletal: Normal range of motion and neck supple. Trachea: No tracheal deviation. Cardiovascular:      Rate and Rhythm: Normal rate and regular rhythm. Heart sounds: No murmur.    Pulmonary:      Effort: Pulmonary effort is normal. No respiratory distress. Breath sounds: Normal breath sounds. No wheezing or rales. Abdominal:      General: Bowel sounds are normal. There is no distension. Palpations: Abdomen is soft. Tenderness: There is no abdominal tenderness. There is no guarding. Musculoskeletal: Normal range of motion. Comments: No midline cervical spinal tenderness   Skin:     General: Skin is warm and dry. Neurological:      General: No focal deficit present. Mental Status: She is alert. She is confused.    Psychiatric:         Behavior: Behavior normal.         DIAGNOSTIC RESULTS   LABS:    Labs Reviewed   COMPREHENSIVE METABOLIC PANEL - Abnormal; Notable for the following components:       Result Value    Potassium 3.4 (*)     Glucose 110 (*)     CREATININE <0.5 (*)     All other components within normal limits    Narrative:     Performed at:  [facility] [address], [facility], [facility]   Phone [phone]   URINE RT REFLEX TO CULTURE - Abnormal; Notable for the following components:    Ketones, Urine 15 (*)     Protein, UA TRACE (*)     All other components within normal limits    Narrative:     Performed at:  [facility] [address], [facility], [facility] ([facility]   Phone [phone]   CULTURE, BLOOD 2   CULTURE, BLOOD 1   CBC WITH AUTO DIFFERENTIAL    Narrative:     Performed at:  [facility] [address], [facility], [facility]   Phone [phone]   TROPONIN    Narrative:     Performed at:  [facility] [address], [facility], [facility]   Phone [phone]   APTT    Narrative:     Performed at:  [facility] [address], [facility], [facility]   Phone [phone]   PROTIME-INR    Narrative:     Performed at:  [facility] [address], [facility]   Phone [phone]   BRAIN NATRIURETIC PEPTIDE    Narrative:     Performed at:  [facility]  [address],  [place], [address]   Phone [phone]   LACTATE, SEPSIS    Narrative:     Performed at:  [facility]  [address],  [place], [address]   Phone [phone]   CK    Narrative:     Performed at:  [facility]  [address],  [place], [address]   Phone [phone]   MICROSCOPIC URINALYSIS    Narrative:     Performed at:  [facility]  [address],  [place], [address]   Phone [phone]    Narrative:     Performed at:  [facility]  [address],  [place], [address]   Phone [phone]       All other labs were within normal range or not returned as of this dictation. EKG: All EKG's are interpreted by the Emergency Department Physician in the absence of a cardiologist.  Please see their note for interpretation of EKG. RADIOLOGY:   Non-plain film images such as CT, Ultrasound and MRI are read by the radiologist. Plain radiographic images are visualized and preliminarily interpreted by the ED Provider with the below findings:        Interpretation per the Radiologist below, if available at the time of this note:    CT CERVICAL SPINE WO CONTRAST   Final Result   No acute fracture or traumatic malalignment. Degenerative changes predominating at C5 through C7 with at least moderate   spinal canal stenosis and severe left osseous neural foraminal narrowing at   C6-C7. CT HEAD WO CONTRAST   Final Result   1. No acute intracranial abnormality.          XR CHEST PORTABLE   Final Result   No active cardiopulmonary disease           Ct Head Wo Contrast    Result Date: 11/18/2020  EXAMINATION: CT OF THE HEAD WITHOUT CONTRAST  11/18/2020 2:50 pm TECHNIQUE: CT of the head was performed without the administration of intravenous contrast. Dose modulation, iterative reconstruction, and/or weight based adjustment of the mA/kV was utilized to reduce the radiation dose to as low as reasonably achievable. COMPARISON: 10/22/2020 HISTORY: ORDERING SYSTEM PROVIDED HISTORY: fall TECHNOLOGIST PROVIDED HISTORY: Reason for exam:->fall Has a \"code stroke\" or \"stroke alert\" been called? ->No Reason for Exam: Fall (pt in by squad from home for fall- pt states she fell two weeks ago- states she is in independent living at a facility- states she needs food to survive which is causing her to be weak. pt states no injury from fall, just difficulty getting back up. ) Acuity: Unknown Type of Exam: Unknown FINDINGS: BRAIN/VENTRICLES: There is no acute intracerebral hemorrhage or extra-axial fluid collection. There is mild cerebral atrophy with moderate periventricular, subcortical and deep white matter small vessel ischemic disease. ORBITS: Status post right cataract removal. SINUSES: The visualized paranasal sinuses and mastoid air cells are clear. SOFT TISSUES/SKULL:  The calvarium is intact. 1. No acute intracranial abnormality. Ct Cervical Spine Wo Contrast    Result Date: 11/18/2020  EXAMINATION: CT OF THE CERVICAL SPINE WITHOUT CONTRAST 11/18/2020 11:49 am TECHNIQUE: CT of the cervical spine was performed without the administration of intravenous contrast. Multiplanar reformatted images are provided for review. Dose modulation, iterative reconstruction, and/or weight based adjustment of the mA/kV was utilized to reduce the radiation dose to as low as reasonably achievable. COMPARISON: 10/22/2020 HISTORY: ORDERING SYSTEM PROVIDED HISTORY: fall TECHNOLOGIST PROVIDED HISTORY: Reason for exam:->fall Reason for Exam: Fall (pt in by squad from home for fall- pt states she fell two weeks ago- states she is in independent living at a facility- states she needs food to survive which is causing her to be weak.  pt states no injury from fall, just difficulty getting back up. ) Acuity: Unknown Type of Exam: Unknown FINDINGS: BONES/ALIGNMENT: There is no acute fracture or traumatic malalignment. Unchanged straightening of the lordosis and mild levoconvex spinal curvature. DEGENERATIVE CHANGES: Mild disc height loss with moderate left lateral degenerative endplate changes and uncovertebral spurring at C5 through C7 with severe osseous neural foraminal stenosis at C6-C7 on the left. Disc osteophyte complexes at C5 through C7 cause at least moderate spinal canal stenosis. SOFT TISSUES: There is no prevertebral soft tissue swelling or hematoma. Normal variant retropharyngeal course of the carotid arteries. Partially imaged lung apices are clear. Airway is widely patent. .     No acute fracture or traumatic malalignment. Degenerative changes predominating at C5 through C7 with at least moderate spinal canal stenosis and severe left osseous neural foraminal narrowing at C6-C7. Xr Chest Portable    Result Date: 11/18/2020  EXAMINATION: ONE XRAY VIEW OF THE CHEST 11/18/2020 2:10 pm COMPARISON: 10/19/2020 HISTORY: ORDERING SYSTEM PROVIDED HISTORY: SOB TECHNOLOGIST PROVIDED HISTORY: Reason for exam:->SOB Reason for Exam: Fall (pt in by squad from home for fall- pt states she fell two weeks ago- states she is in independent living at a facility- states she needs food to survive which is causing her to be weak. pt states no injury from fall, just difficulty getting back up. ) Acuity: Acute Type of Exam: Initial FINDINGS: Cardiomegaly. Pulmonary vasculature within normal limits. Thoracic aorta tortuous. Lungs clear.   Costophrenic angles sharp     No active cardiopulmonary disease           PROCEDURES   Unless otherwise noted below, none     Procedures    CRITICAL CARE TIME   N/A    CONSULTS:  IP CONSULT TO SOCIAL WORK      EMERGENCY DEPARTMENT COURSE and DIFFERENTIAL DIAGNOSIS/MDM:   Vitals:    Vitals:    11/18/20 1800 11/18/20 1830 11/18/20 1930 11/18/20 2030   BP: (!) mis-transcribed.)    Maximiliano Orozco PA-C (electronically signed)            Maximiliano Orozco PA-C  11/18/20 2055

## 2020-11-18 NOTE — ED PROVIDER NOTES
I independently performed a history and physical on Christiano Finn. All diagnostic, treatment, and disposition decisions were made by myself in conjunction with the advanced practice provider. Briefly, this is a 66 y.o. female here for falls. States that she fell today and 2 weeks ago. States that she is falling because she is feeling weak from not having any food. States that she lives in an independent living and no one gives her any food. Denies any pain at this time. No nausea or vomiting. No fever or cough. Denies any symptoms at this time except for stating that she does not have food. She is oriented to person and time. On exam,   General: Patient is in no acute distress  Skin: No cyanosis  HEENT: Moist mucous membranes  Heart: Regular rate, regular rhythm  Lung: No respiratory distress  Abdomen: Soft, nontender  Neuro: Moving all extremities, no facial droop, no slurred speech, answers questions appropriately, cranial nerves II to XII intact, normal finger-to-nose, normal sensation over arms and legs, normal strength in arms and legs        EKG  The Ekg interpreted by me in the absence of a cardiologist shows. Normal sinus rhythm  No acute ST changes or T wave abnormalities     Screenings            MDM  Patient is a 78-year-old woman who presents with falls, weakness, and chief complaint of \"I do not have any food \". May be infectious versus metabolic versus due to trauma versus polypharmacy. We will work-up for causes of weakness including ACS, UTI, pneumonia. Will obtain CT head and C-spine and evaluation for intracranial bleed or cervical spinal injury however she is not complaining of any headache or C-spine pain. During her last hospitalization, she was recommended to be discharged into SNF however she refused.   I do not believe that she is safe to be discharged back to her original living situation at this time given the presence of multiple falls, weakness, and difficulty obtaining food.  Anticipate admission versus discharge to SNF    Attempted going through social work to directly transfer patient to SNF however we were unable to therefore will admit patient to hospitalist service    Patient Referrals:  No follow-up provider specified. Discharge Medications:  New Prescriptions    No medications on file       FINAL IMPRESSION  1. Altered mental status, unspecified altered mental status type        Blood pressure 133/68, pulse 89, temperature 97.2 °F (36.2 °C), temperature source Oral, resp. rate 19, height 5' 6\" (1.676 m), weight 155 lb (70.3 kg), SpO2 92 %, not currently breastfeeding. For further details of [facility] emergency department encounter, please see documentation by advanced practice provider, Paula Flowers.         Perry Rivas MD  11/18/20 1355

## 2020-11-18 NOTE — ED NOTES
Pt comes in today by squad from assisted living facility due to AMS. Pt states during triage that she needs food to survive which is causing her to be weak. Pt states no injury from fall, just difficulty getting back up. The patient is only alert to person. Pt has no other complaints at this time. Bed alarm initiated for the safety of the patient. Vitals stable. Resp easy and even. Both siderails up. Call light within reach. Will continue to monitor.       Candis Bermudez RN  11/18/20 8787

## 2020-11-18 NOTE — CARE COORDINATION
DADA consulted to assist pt with possible placement. Pt was admitted 1 month ago, qualified for SNF but refused. Pt discharged home w/[facility]. Referrals were also made to [facility], [phone] for assistance to Assisted Living Placement. Pt returned to the ED today complaining of falls. Pt only oriented to person at this time. Pt lives alone in an apartment at [facility], a senior independently living building. Pt has no friends or family that can assist.  Only contact on file rings to a number at [facility] (possibly a friend who lives there). DADA consulted with ED staff and concurred with pt's falls and confusion, pt would not be safe discharging home. Ordered a rapid COVID but was negative. Pt reported to be independent with ADLs at this time and probably will not qualify for SNF. DADA left message with Daisy Son at [facility] to see if they are in the process of placing pt in assisted living. Discharge Plan:  Pt unsafe to go home from ED. Pt was confused, only orented to self. Pt does not have any friends or family to assist.  Contact Evelyn rodríguez/[facility] (referral made to her last month) to check on pt's status for assisted living placement.     Electronically signed by SOFIA Guzman, EMILIANO on 11/18/2020 at 7:13 PM

## 2020-11-19 VITALS
HEIGHT: 66 IN | DIASTOLIC BLOOD PRESSURE: 75 MMHG | TEMPERATURE: 97.9 F | SYSTOLIC BLOOD PRESSURE: 120 MMHG | WEIGHT: 155 LBS | BODY MASS INDEX: 24.91 KG/M2 | HEART RATE: 65 BPM | RESPIRATION RATE: 16 BRPM | OXYGEN SATURATION: 93 %

## 2020-11-19 PROCEDURE — 6370000000 HC RX 637 (ALT 250 FOR IP): Performed by: HOSPITALIST

## 2020-11-19 PROCEDURE — 96374 THER/PROPH/DIAG INJ IV PUSH: CPT

## 2020-11-19 PROCEDURE — 97162 PT EVAL MOD COMPLEX 30 MIN: CPT

## 2020-11-19 PROCEDURE — 6360000002 HC RX W HCPCS: Performed by: HOSPITALIST

## 2020-11-19 PROCEDURE — 2580000003 HC RX 258: Performed by: HOSPITALIST

## 2020-11-19 PROCEDURE — G0378 HOSPITAL OBSERVATION PER HR: HCPCS

## 2020-11-19 PROCEDURE — 97116 GAIT TRAINING THERAPY: CPT

## 2020-11-19 PROCEDURE — 97535 SELF CARE MNGMENT TRAINING: CPT

## 2020-11-19 PROCEDURE — 96372 THER/PROPH/DIAG INJ SC/IM: CPT

## 2020-11-19 PROCEDURE — 97166 OT EVAL MOD COMPLEX 45 MIN: CPT

## 2020-11-19 RX ORDER — LORAZEPAM 2 MG/ML
0.5 INJECTION INTRAMUSCULAR ONCE
Status: COMPLETED | OUTPATIENT
Start: 2020-11-19 | End: 2020-11-19

## 2020-11-19 RX ORDER — HALOPERIDOL 5 MG/ML
5 INJECTION INTRAMUSCULAR ONCE
Status: COMPLETED | OUTPATIENT
Start: 2020-11-19 | End: 2020-11-19

## 2020-11-19 RX ORDER — POTASSIUM BICARBONATE 25 MEQ/1
25 TABLET, EFFERVESCENT ORAL ONCE
Status: COMPLETED | OUTPATIENT
Start: 2020-11-19 | End: 2020-11-19

## 2020-11-19 RX ADMIN — ENOXAPARIN SODIUM 40 MG: 40 INJECTION SUBCUTANEOUS at 07:42

## 2020-11-19 RX ADMIN — HALOPERIDOL LACTATE 5 MG: 5 INJECTION, SOLUTION INTRAMUSCULAR at 13:25

## 2020-11-19 RX ADMIN — Medication 10 ML: at 07:43

## 2020-11-19 RX ADMIN — LEVOTHYROXINE SODIUM 50 MCG: 0.03 TABLET ORAL at 05:32

## 2020-11-19 RX ADMIN — LORAZEPAM 0.5 MG: 2 INJECTION INTRAMUSCULAR; INTRAVENOUS at 11:15

## 2020-11-19 RX ADMIN — POTASSIUM BICARBONATE 25 MEQ: 978 TABLET, EFFERVESCENT ORAL at 07:42

## 2020-11-19 ASSESSMENT — PAIN SCALES - GENERAL
PAINLEVEL_OUTOF10: 0
PAINLEVEL_OUTOF10: 0

## 2020-11-19 NOTE — PROGRESS NOTES
4 Eyes Skin Assessment     NAME:  Nidia Smith  YOB: 1945  MEDICAL RECORD NUMBER:  [id number]    The patient is being assess for  Admission    I agree that 2 RN's have performed a thorough Head to Toe Skin Assessment on the patient. ALL assessment sites listed below have been assessed. Areas assessed by both nurses:    Head, Face, Ears, Shoulders, Back, Chest, Arms, Elbows, Hands, Sacrum. Buttock, Coccyx, Ischium and Legs. Feet and Heels        Does the Patient have a Wound?  No noted wound(s)       Shaji Prevention initiated:  No   Wound Care Orders initiated:  No    Pressure Injury (Stage 3,4, Unstageable, DTI, NWPT, and Complex wounds) if present place consult order under [de-identified] No    New and Established Ostomies if present place consult order under : No      Nurse 1 eSignature: Electronically signed by Supa Rodríguez RN on 11/19/20 at 12:05 AM EST    **SHARE this note so that the co-signing nurse is able to place an eSignature**    Nurse 2 eSignature: Electronically signed by Kt Araujo RN on 11/19/20 at 12:14 AM EST

## 2020-11-19 NOTE — CARE COORDINATION
Aware of pt's admission for placement and d/c order for today. Pt was a non-admit on last dc for home care w/[facility], as she did not answer the multiple phone calls made. Received voicemail from Williams at [employer]. She stated that pt has too little money for the amount of care that she will need-would need to apply for ALLEY or pending ALLEY for an ALU. Spoke w/Vicki at [address], as pt had been referred. She stated that in 2019 pt was active w/[facility], but has since cancelled services and does not complete assessments when new referrals made on her behalf. Toni Knapp stated that [facility] referral had been made, but was closed by them-stated pt is allowed to be non-compliant. Left message for  [place] at [place], as Tiffanieaileen Aria stated she isn't sure where pt has been living (pt states [place] but provides [facility] address). Pt's address comes up as an apartment at [facility]. Pt at this time is refusing placement-informed MD.  Pt will d/c to home today at 8pm-referral made for home care to [facility] and provided another phone number ([phone]) to reach patient. [facility] to transport pt home. Attempted to call [facility], rang to an unidentified voicemail-no voicemail left.   Electronically signed by SOFIA Centeno on 11/19/2020 at 4:29 PM

## 2020-11-19 NOTE — DISCHARGE INSTR - COC
Continuity of Care Form    Patient Name: Nidia Smith   :  1945  MRN:  [id number]    Admit date:  2020  Discharge date:      Code Status Order: Full Code   Advance Directives:   [address] [facility] Documentation       Date/Time Healthcare Directive Type of Healthcare Directive Copy in [address] [place] [address] [address] Agent's Name Healthcare Agent's Phone Number    [phone]  No, patient does not have an advance directive for healthcare treatment -- -- -- -- --            Admitting Physician:  Aide Bowers MD  PCP: Deidre Mon MD    Discharging Nurse: Blanca Berry [facility] Unit/Room#: 7XR-9847/5975-27  Discharging Unit Phone Number: [phone]    Emergency Contact:   Extended Emergency Contact Information  Primary Emergency Contact: Gail Velez  Address: [address] [place] [address], [address] Phone: [phone]  Relation: Other  Secondary Emergency Contact: None,Given  Relation: Other    Past Surgical History:  Past Surgical History:   Procedure Laterality Date    HYSTERECTOMY         Immunization History:   Immunization History   Administered Date(s) Administered    Influenza, High Dose (Fluzone 65 yrs and older) 2009, 10/10/2016, 10/12/2017    Pneumococcal Conjugate 13-valent ([id number]) 2009    Pneumococcal Polysaccharide (Pddddntkc68) 2019    Tdap (Boostrix, Adacel) 2019       Active Problems:  Patient Active Problem List   Diagnosis Code    Hypothyroidism (acquired) E03.9    Prediabetes R73.03    Anxiety F41.9    Postmenopausal Z78.0    Essential hypertension I10    Bacterial urinary tract infection N39.0, A49.9    Fall W19. Soto Menezes    Hypokalemia E87.6    UTI (urinary tract infection) N39.0    Encephalopathy due to infection G93.49, B99.9    Acute metabolic encephalopathy R61.57    Fall (on) (from) other stairs and steps, initial encounter W10. Adwoa Shields Isolation/Infection:   Isolation            No Isolation          Patient Infection Status       Infection Onset Added Last Indicated Last Indicated By Review Planned Expiration Resolved Resolved By    None active    Resolved    COVID-19 Rule Out 11/18/20 11/18/20 11/18/20 COVID-19 (Ordered)   11/18/20 Rule-Out Test Resulted    COVID-19 Rule Out 10/19/20 10/19/20 10/19/20 COVID-19 (Ordered)   10/20/20 Rule-Out Test Resulted            Nurse Assessment:  Last Vital Signs: /82   Pulse 66   Temp 97.4 °F (36.3 °C) (Oral)   Resp 16   Ht 5' 6\" (1.676 m)   Wt 155 lb (70.3 kg)   SpO2 93%   BMI 25.02 kg/m²     Last documented pain score (0-10 scale): Pain Level: 0  Last Weight:   Wt Readings from Last 1 Encounters:   11/18/20 155 lb (70.3 kg)     Mental Status:  disoriented and alert    IV Access:  - None    Nursing Mobility/ADLs:  Walking   Assisted  Transfer  Assisted  Bathing  Independent  Dressing  Independent  Toileting  Independent  Feeding  [address]  Independent  Med Delivery   whole    Wound Care Documentation and Therapy:        Elimination:  Continence:   · Bowel: Yes  · Bladder: Yes  Urinary Catheter: None   Colostomy/Ileostomy/Ileal Conduit: No       Date of Last BM: 11/19  No intake or output data in the 24 hours ending 11/19/20 0700  No intake/output data recorded. Safety Concerns:     History of Falls (last 30 days) and At Risk for Falls    Impairments/Disabilities:      None    Nutrition Therapy:  Current Nutrition Therapy:   - Oral Diet:  General    Routes of Feeding: Oral  Liquids: Thin Liquids  Daily Fluid Restriction: no  Last Modified Barium Swallow with Video (Video Swallowing Test): not done    Treatments at the Time of Hospital Discharge:   Respiratory Treatments: none  Oxygen Therapy:  is not on home oxygen therapy.   Ventilator:    - No ventilator support    Rehab Therapies: Physical Therapy and Occupational Therapy  Weight Bearing Status/Restrictions: No weight bearing restirctions  Other Medical Equipment (for information only, NOT a DME order):  walker  Other Treatments: none    Patient's personal belongings (please select all that are sent with patient):  None    RN SIGNATURE:  Electronically signed by Mahogany Juarez RN on 11/19/20 at 3:52 PM EST    CASE MANAGEMENT/SOCIAL WORK SECTION    Inpatient Status Date: ***    Readmission Risk Assessment Score:  Readmission Risk              Risk of Unplanned Readmission:        0           Discharging to Facility/ Agency   · Name:   · Address:  · Phone:  · Fax:    Dialysis Facility (if applicable)   · Name:  · Address:  · Dialysis Schedule:  · Phone:  · Fax:    / signature: {Esignature:[id number]:::0}    PHYSICIAN SECTION    Prognosis: Good    Condition at Discharge: Stable    Rehab Potential (if transferring to Rehab): Good    Recommended Labs or Other Treatments After Discharge:     Physician Certification: I certify the above information and transfer of Mukesh Cee  is necessary for the continuing treatment of the diagnosis listed and that she requires [facility] for greater 30 days.      Update Admission H&P: No change in H&P    PHYSICIAN SIGNATURE:  Electronically signed by Susy Jang MD on 11/19/20 at 7:00 AM EST

## 2020-11-19 NOTE — H&P
HOSPITALISTS HISTORY AND PHYSICAL    11/18/2020 7:07 PM    Patient Information:  BRITANY BYERS is a 66 y.o. female [id number]  PCP:  Madisyn Tellez MD (Tel: [phone] )    Chief complaint:    Chief Complaint   Patient presents with   Miriam Cobian     pt in by squad from home for fall- pt states she fell two weeks ago- states she is in independent living at a facility- states she needs food to survive which is causing her to be weak. pt states no injury from fall, just difficulty getting back up. History of Present Illness:   Christiano Finn is a 66 y.o. female who presents to the emergency department today for evaluation for AMS. Most report from ER and pt. Pt appreaently fell about two weeks ango unclear reson. Since then pt  Has been feeling weak and tired she is a poor historian. She complains of not getting enough food at the nursing home deneis t any chest pain or shortness of breath. She has not had any recent illnesses. No other history is able to be obtained at       REVIEW OF SYSTEMS:   Constitutional: Negative for fever,chills or night sweats  ENT: Negative for rhinorrhea, epistaxis, hoarseness, sore throat. Respiratory: Negative for shortness of breath,wheezing  Cardiovascular: Negative for chest pain, palpitations   Gastrointestinal: Negative for nausea, vomiting, diarrhea  Genitourinary: Negative for polyuria, dysuria   Hematologic/Lymphatic: Negative for bleeding tendency, easy bruising  Musculoskeletal: Negative for myalgias and arthralgias  Neurologic: Negative for confusion,dysarthria. Skin: Negative for itching,rash, good capillary refill. Psychiatric: Negative for depression,anxiety, agitation. Endocrine: Negative for polydipsia,polyuria,heat /cold intolerance. Past Medical History:   has a past medical history of Anxiety, Hypothyroidism, Irritable bowel syndrome, and Prediabetes.      Past Surgical History:   has a past surgical history that includes Hysterectomy. Medications:  No current facility-administered medications on file prior to encounter. Current Outpatient Medications on File Prior to Encounter   Medication Sig Dispense Refill    QUEtiapine (SEROQUEL) 25 MG tablet Take 0.5 tablets by mouth nightly 15 tablet 0    levothyroxine (SYNTHROID) 50 MCG tablet Take 1 tablet by mouth Daily 30 tablet 0       Allergies:  No Known Allergies     Social History:   reports that she has never smoked. She has never used smokeless tobacco. She reports that she does not drink alcohol. Family History:  family history includes No Known Problems in her daughter, sister, son, and son; Thyroid Disease in her sister. ,     Physical Exam:  /68   Pulse 89   Temp 97.2 °F (36.2 °C) (Oral)   Resp 19   Ht 5' 6\" (1.676 m)   Wt 155 lb (70.3 kg)   SpO2 92%   BMI 25.02 kg/m²     General appearance:  Appears comfortable. Well nourished  Eyes: Sclera clear, pupils equal  ENT: Moist mucus membranes, no thrush. Trachea midline. Cardiovascular: Regular rhythm, normal S1, S2. No murmur, gallop, rub. No edema in lower extremities  Respiratory: Clear to auscultation bilaterally, no wheeze, good inspiratory effort  Gastrointestinal: Abdomen soft, non-tender, not distended, normal bowel sounds  Musculoskeletal: No cyanosis in digits, neck supple  Neurology: Cranial nerves grossly intact. Alert and oriented in time, place and person. No speech or motor deficits  Psychiatry: Appropriate affect.  Not agitated  Skin: Warm, dry, normal turgor, no rash    Labs:  CBC:   Lab Results   Component Value Date    WBC 6.5 11/18/2020    RBC 4.42 11/18/2020    HGB 14.5 11/18/2020    HCT 43.1 11/18/2020    MCV 97.4 11/18/2020    MCH 32.8 11/18/2020    MCHC 33.6 11/18/2020    RDW 13.8 11/18/2020     11/18/2020    MPV 8.7 11/18/2020     BMP:    Lab Results   Component Value Date     11/18/2020    K 3.4 11/18/2020    K 3.8 10/23/2020     11/18/2020    CO2 21 11/18/2020    BUN 17 11/18/2020    CREATININE <0.5 11/18/2020    CALCIUM 9.3 11/18/2020    GFRAA >60 11/18/2020    LABGLOM >60 11/18/2020    GLUCOSE 110 11/18/2020       Chest Xray:   EKG:    I visualized CXR images and EKG strips       Problem List  Active Problems:    Fall (on) (from) other stairs and steps, initial encounter  Resolved Problems:    * No resolved hospital problems.  *        Assessment/Plan:   Fall   with generalized weakness  - no acute fidings  - fall happened 2 weeks ago  - was unable to place pt back to [facility] so will be admitted for overnight obs and hopefully to SNF tomorrow am  - s/w consulted         Libia Negrete MD    11/18/2020 7:07 PM

## 2020-11-19 NOTE — PROGRESS NOTES
Occupational Therapy   Occupational Therapy Initial Assessment  Date: 2020   Patient Name: Devorah Olivarez  MRN: [id number]     : 1945    Date of Service: 2020    Discharge Recommendations: Devorah Olivarez scored a 18/24 on the AM-PAC ADL Inpatient form. Current research shows that an AM-PAC score of 17 or less is typically not associated with a discharge to the patient's home setting. Based on the patient's AM-PAC score and their current ADL deficits, it is recommended that the patient have 3-5 sessions per week of Occupational Therapy at d/c to increase the patient's independence. Please see assessment section for further patient specific details. Patient scored 18/24 on ADL AM-PAC due to having the physical ability to complete tasks for herself, however pt is confused and does not understand that she needs 24 hr SPV due to decreased cognition. Pt has decreased safety awareness, short term memory and impulsive at times. Pt does not have family around that is able to provide care or supervision at this time. If patient discharges prior to next session this note will serve as a discharge summary. Please see below for the latest assessment towards goals. OT Equipment Recommendations  Equipment Needed: No    Assessment   Performance deficits / Impairments: Decreased safe awareness;Decreased cognition;Decreased high-level IADLs;Decreased endurance;Decreased functional mobility   Assessment: Pt is near baseline function of independence, however pt is hindered by decreased cognition and inability to consistently follow commands and requires constant redirection to complete ADL/mobility tasks.   Treatment Diagnosis: Above deficits associated with fall on/from other stairs and steps  Prognosis: Good  Decision Making: Medium Complexity  REQUIRES OT FOLLOW UP: Yes  Activity Tolerance  Activity Tolerance: Patient Tolerated treatment well;Treatment limited secondary to decreased cognition  Activity Tolerance: Pt limited by cognition and repeating same phrase after every command/task completion, requires constant redirection towards tasks. Safety Devices  Safety Devices in place: Yes  Type of devices: All fall risk precautions in place; Patient at risk for falls; Left in chair;Chair alarm in place;Call light within reach;Nurse notified(Pt attempted to stand up from chair by herself, she is confused and does not understand that she has to have assistance when in the hospital.)  Restraints  Initially in place: No           Patient Diagnosis(es): The encounter diagnosis was Altered mental status, unspecified altered mental status type. has a past medical history of Anxiety, Hypothyroidism, Irritable bowel syndrome, and Prediabetes. has a past surgical history that includes Hysterectomy. Treatment Diagnosis: Above deficits associated with fall on/from other stairs and steps      Restrictions  Restrictions/Precautions  Restrictions/Precautions: Fall Risk(high fall risk, general diet)  Required Braces or Orthoses?: No  Position Activity Restriction  Other position/activity restrictions: 66 y.o. female who presents to the emergency department today for evaluation for altered mental status. The patient is from independent living, and the patient tells me that she fell today. The patient initially told me that she tripped, and fell. She did hit her head. She is unsure if she lost any consciousness. I asked again why the patient fell and she states that \"it is because of lack of food\". She tells me that she then believes that she actually fell 2 weeks ago. She states that she was last eating this morning. The patient is only alert to person. Subjective   General  Chart Reviewed: Yes  Patient assessed for rehabilitation services?: Yes  Family / Caregiver Present: No  Diagnosis: Fall on/from other stairs and steps  Subjective  Subjective: Pt supine in bed upon arrival, agreeable to OT/PT evaluation.  Pt is confused throughout session and continuously asks if we can find her a different place to live. Patient Currently in Pain: Denies  Vital Signs  Patient Currently in Pain: Denies  Social/Functional History  Social/Functional History  Lives With: Alone  Type of Home: Facility  Home Layout: One level  Home Access: Level entry  Bathroom Shower/Tub: Tub only(Sponge bathes, difficulty with tub)  Bathroom Toilet: Standard  Home Equipment: Cane, Rolling walker  ADL Assistance: Independent  Homemaking Assistance: Independent  Homemaking Responsibilities: Yes(Unable to complete independently; pt is confused.)  Ambulation Assistance: Independent  Transfer Assistance: Independent  Active : Yes  Additional Comments: Pt is confused reporting that she lives alone but feels isolated and unable to receive help around her house. Pt continues to ask for name/number because she is searching for an assisted living, therefore she can receive help w/ IADLs and gait. Pt is confused and is a questionable historian, per previous chart review pt does not have any AE (cane/walker) at home like she reports today. Objective   Vision: Impaired  Vision Exceptions: Wears glasses at all times  Hearing: Within functional limits    Orientation  Overall Orientation Status: Within Functional Limits(Pt is confused and needs constant redirection of current place and situation.)  Observation/Palpation  Posture: Good  Observation: Able to stand and sit fully upright without issue  Balance  Sitting Balance: Supervision  Standing Balance: Contact guard assistance  Functional Mobility  Functional - Mobility Device: Rolling Walker  Activity: To/from bathroom; Other  Assist Level: Contact guard assistance  Functional Mobility Comments: Pt ambulated to restroom using RW w/ SBA; pt ambulated outside in hallway w/o device requiring Min(a)-CGA, however she demonstrated decreased step length and would hold onto walls, pt was given the RW and was able to ambulate around hallway and back to chair w/ SBA. Toilet Transfers  Toilet - Technique: Ambulating  Equipment Used: Standard toilet  Toilet Transfer: Supervision  Toilet Transfers Comments: SBA to descend to toilet, however pt stood up without notifying therapist, SPV required. ADL  Grooming: Supervision(In stance at sink for washing hands, combing hair, oral care)  Toileting: Supervision(transfer/ clothing mgmt/ hygiene)  Additional Comments: Pt confused throughout session, agreed to get cleaned up and change gowns however did not participate in task because 'her gown is warm'. Pt has full AROM of UEs and demonstrated the appropriate movements to dress herself. Pt requires SPV for toileting. Tone RUE  RUE Tone: Normotonic  Tone LUE  LUE Tone: Normotonic  Coordination  Movements Are Fluid And Coordinated: Yes     Bed mobility  Supine to Sit: Minimal assistance(Pt grabbed onto therapist arm for assistance. Given direction to use HR, able to use HR to come to full sitting.)  Scooting: Stand by assistance  Comment: HOB elevated  Transfers  Sit to stand: Stand by assistance  Stand to sit: Stand by assistance  Vision - Basic Assessment  Visual History: No significant visual history  Patient Visual Report: No visual complaint reported. Cognition  Overall Cognitive Status: Exceptions  Arousal/Alertness: Appropriate responses to stimuli  Following Commands: Follows one step commands with repetition; Follows one step commands with increased time  Attention Span: Difficulty attending to directions; Difficulty dividing attention  Memory: Decreased recall of biographical Information;Decreased recall of recent events;Decreased short term memory  Safety Judgement: Decreased awareness of need for safety;Decreased awareness of need for assistance  Problem Solving: Decreased awareness of errors  Insights: Decreased awareness of deficits  Initiation: Does not require cues  Sequencing: Requires cues for some  Cognition Comment: Pt presents with a confused state and impulsivity while completing transfers, continous to ask for contact information to find a better living place after every command. Perception  Overall Perceptual Status: WFL     Sensation  Overall Sensation Status: WFL(Pt denies N/T)        LUE AROM (degrees)  LUE AROM : WFL  RUE AROM (degrees)  RUE AROM : WFL  LUE Strength  Gross LUE Strength: WFL  L Hand General: 4/5  RUE Strength  Gross RUE Strength: WFL  R Hand General: 4/5                   Plan   Plan  Times per week: 3-5  Times per day: Daily  Current Treatment Recommendations: Endurance Training, Functional Mobility Training, Gait Training, Cognitive Reorientation, Safety Education & Training, Self-Care / ADL, Strengthening, Balance Training  AM-PAC Score        AM-PAC Inpatient Daily Activity Raw Score: 18 (11/19/20 1111)  AM-PAC Inpatient ADL T-Scale Score : 38.66 (11/19/20 1111)  ADL Inpatient CMS 0-100% Score: 46.65 (11/19/20 1111)  ADL Inpatient CMS G-Code Modifier : CK (11/19/20 1111)    Goals  Short term goals  Time Frame for Short term goals: Discharge  Short term goal 1: Mod I Dressing Tasks  Short term goal 2: Mod I Toileting tasks  Short term goal 3: Mod I Fxl mobility  Short term goal 4: Mod I Fxl transfers  Long term goals  Time Frame for Long term goals : LTG=STG       Therapy Time   Individual Concurrent Group Co-treatment   Time In 0824         Time Out 0909         Minutes 45           Timed Code Treatment Minutes:  0 min (split charges with PT due to obvs status)    Total Treatment Minutes:  45 minutes     JEIMY Rand    OT provided direct supervision to student, facilitated in making skilled judgements throughout duration of session.     EZIO Linn OTR/L [id number]     Humble Meredith OT

## 2020-11-19 NOTE — PROGRESS NOTES
Pt placed in geriatric chair for her safety, continuously trying to get up. Needs reoriented frequently. Camera placed in room as well. 1119 pt verbally aggressive and attempted to bite this RN. PRN ativan given per order. Remains in geriatric chair for safety. 1126 pt continuously screaming and pulling at chair, hitting chair. Will monitor, if pt continues to hit chair, will reassess if chair becomes dangerous for patient will put patient in bed.     1155 pt more relaxed, sitting in chair pleasantly feeding herself lunch. 1405 pt refusing to go to SNF, requesting to go home.

## 2020-11-19 NOTE — PROGRESS NOTES
Pt refusing all help, requesting to go home. APS is to be called by social work to see if they will open case as patient lives independently at an apartment and is not safe however pt refusing all help. Transport set up for patient to go home at 5. Pt oriented to self and sometimes times but has short term memory loss and is impulsive.

## 2020-11-19 NOTE — PLAN OF CARE
Problem: Falls - Risk of:  Goal: Will remain free from falls  Description: Will remain free from falls  Outcome: Ongoing  Goal: Absence of physical injury  Description: Absence of physical injury  Outcome: Ongoing     Problem: Mental Status - Impaired:  Goal: Mental status will improve  Description: Mental status will improve  Outcome: Ongoing

## 2020-11-19 NOTE — PROGRESS NOTES
Morning shift assessment completed. Patient requires frequent reorientation to situation and place. The care plan and education have been reviewed and mutually agreed upon with the patient. Call light in reach. Fall precautions in place. Will continue to monitor.  Rima Goldman RN

## 2020-11-19 NOTE — PLAN OF CARE
Problem: Falls - Risk of:  Goal: Will remain free from falls  Description: Will remain free from falls  11/19/2020 1034 by Addie Farrar RN  Outcome: Ongoing  11/18/2020 2308 by Ricardo Kim RN  Outcome: Ongoing  Goal: Absence of physical injury  Description: Absence of physical injury  11/19/2020 1034 by Addie Farrar RN  Outcome: Ongoing  11/18/2020 2308 by Ricardo Kim RN  Outcome: Ongoing     Problem: Mental Status - Impaired:  Goal: Mental status will improve  Description: Mental status will improve  11/19/2020 1034 by Addie Farrar RN  Outcome: Ongoing  11/18/2020 2308 by Ricardo Kim RN  Outcome: Ongoing

## 2020-11-19 NOTE — PROGRESS NOTES
Admission assessment completed, pt is alert but confuse, VSS, fall precautions in place, call light within reach, denies any pain or other needs at this time. See flowsheets and LDA. Jesu Cruz will monitor pt pt. The care plan and education has been reviewed and mutually agreed upon with the patient.

## 2020-11-20 LAB
EKG ATRIAL RATE: 60 BPM
EKG DIAGNOSIS: NORMAL
EKG P AXIS: 36 DEGREES
EKG P-R INTERVAL: 208 MS
EKG Q-T INTERVAL: 464 MS
EKG QRS DURATION: 72 MS
EKG QTC CALCULATION (BAZETT): 464 MS
EKG R AXIS: 58 DEGREES
EKG T AXIS: 76 DEGREES
EKG VENTRICULAR RATE: 60 BPM

## 2020-11-20 PROCEDURE — 93010 ELECTROCARDIOGRAM REPORT: CPT | Performed by: INTERNAL MEDICINE

## 2020-11-22 LAB
BLOOD CULTURE, ROUTINE: NORMAL
CULTURE, BLOOD 2: NORMAL

## 2020-11-23 PROBLEM — N39.0 UTI (URINARY TRACT INFECTION): Status: RESOLVED | Noted: 2020-10-20 | Resolved: 2020-11-23

## 2020-11-30 NOTE — DISCHARGE SUMMARY
[facility] DISCHARGE SUMMARY    Patient Demographics    Patient. Rishi Boss  Date of Birth. 1945  MRN. [id number]     Primary care provider. Maycol Santana MD  (Tel: [phone])    Admit date: 11/18/2020    Discharge date (blank if same as Note Date): 11/19/2020  Note Date: 11/30/2020     Reason for Hospitalization. Chief Complaint   Patient presents with   [facility]     pt in by squad from home for fall- pt states she fell two weeks ago- states she is in independent living at a facility- states she needs food to survive which is causing her to be weak. pt states no injury from fall, just difficulty getting back up. [facility] Course. Fall   with generalized weakness  - no acute fidings  - fall happened 2 weeks ago  - was unable to place pt back to [facility] due to patient refusal. Pt was discharge home with home care     Consults. IP CONSULT TO SOCIAL WORK  IP CONSULT TO SPIRITUAL SERVICES  IP CONSULT TO HOME CARE NEEDS    Physical examination on discharge day. /75   Pulse 65   Temp 97.9 °F (36.6 °C) (Oral)   Resp 16   Ht 5' 6\" (1.676 m)   Wt 155 lb (70.3 kg)   SpO2 93%   BMI 25.02 kg/m²   General appearance. Alert. Looks comfortable. HEENT. Sclera clear. Moist mucus membranes. Cardiovascular. Regular rate and rhythm, normal S1, S2. No murmur. Respiratory. Not using accessory muscles. Clear to auscultation bilaterally, no wheeze. Gastrointestinal. Abdomen soft, non-tender, not distended, normal bowel sounds  Neurology. Facial symmetry. No speech deficits. Moving all extremities equally. Extremities. No edema in lower extremities. Skin. Warm, dry, normal turgor    Condition at time of discharge stable     Medication instructions provided to patient at discharge.      Medication List      CONTINUE taking these medications    levothyroxine 50 MCG tablet  Commonly known as:  SYNTHROID  Take 1 tablet by mouth Daily     QUEtiapine 25 MG tablet  Commonly known as:  SEROQUEL  Take 0.5 tablets by mouth nightly            Discharge recommendations given to patient. Follow Up. pcp in 1 week   Disposition. home  Activity. activity as tolerated  Diet: No diet orders on file      Spent 45  minutes in discharge process.     Signed:  Walter Tomlinson MD     11/30/2020 1:49 PM

## 2021-01-21 ENCOUNTER — TELEPHONE (OUTPATIENT)
Dept: PRIMARY CARE CLINIC | Age: 76
End: 2021-01-21

## 2021-01-21 NOTE — TELEPHONE ENCOUNTER
Lorena Lopes from Adult protective services called to let know that patient will be getting evicted due to failed apartment inspections/living condition. He states that the eviction process has been going on since September 2020. There was a court trial on 01/19 nothing was settled so it got moved to february. He states that she was going to be moving to [facility] living but patient changed her mind. He wanted to know if she has any cognitive problems, because she seemed to be repeating her self a lot and it's like she didn't understand that she is getting evicted. He also mentioned that she has not had a shower in sometimes. Her hair was greasy and didn't look clean. She would not give him any information on emergency contacts or names of people next of kin, so that he can reach out to them and seek help for her. He believes that she is seeing a different PCP because she is currently taking Levothyroxine and Seroquel that was prescribed by a Dr. Blaine Gordon. He states that Misael David does not remember seeing Dr. Blaine Gordon, he states that he cant determine if she is just confused on a lot of things for just giving him the run around. Would like for her to be seen if he can convince her of coming into our office.

## 2021-02-19 ENCOUNTER — TELEPHONE (OUTPATIENT)
Dept: PRIMARY CARE CLINIC | Age: 76
End: 2021-02-19

## 2021-02-19 NOTE — TELEPHONE ENCOUNTER
Living facility is aware that you haven't saw the patient in a year but states she is in the process of losing her place at [facility] [address] if these papers aren't signed, so she wants to know can you sign the papers or do you need to see the patient first before you would be willing to sign the papers please advise.

## 2021-02-26 ENCOUNTER — OFFICE VISIT (OUTPATIENT)
Dept: PRIMARY CARE CLINIC | Age: 76
End: 2021-02-26
Payer: MEDICARE

## 2021-02-26 VITALS
HEART RATE: 99 BPM | SYSTOLIC BLOOD PRESSURE: 134 MMHG | TEMPERATURE: 97.8 F | BODY MASS INDEX: 24.86 KG/M2 | OXYGEN SATURATION: 95 % | WEIGHT: 154 LBS | DIASTOLIC BLOOD PRESSURE: 86 MMHG

## 2021-02-26 DIAGNOSIS — R73.09 ELEVATED HEMOGLOBIN A1C: ICD-10-CM

## 2021-02-26 DIAGNOSIS — E03.9 HYPOTHYROIDISM, UNSPECIFIED TYPE: Primary | ICD-10-CM

## 2021-02-26 DIAGNOSIS — I10 ESSENTIAL HYPERTENSION: ICD-10-CM

## 2021-02-26 DIAGNOSIS — E03.9 HYPOTHYROIDISM, UNSPECIFIED TYPE: ICD-10-CM

## 2021-02-26 DIAGNOSIS — Z87.440 HISTORY OF UTI: ICD-10-CM

## 2021-02-26 LAB
BILIRUBIN, POC: NORMAL
BLOOD URINE, POC: NORMAL
CLARITY, POC: CLEAR
COLOR, POC: YELLOW
GLUCOSE URINE, POC: NORMAL
KETONES, POC: NORMAL
LEUKOCYTE EST, POC: NORMAL
NITRITE, POC: NORMAL
PH, POC: 6
PROTEIN, POC: NORMAL
SPECIFIC GRAVITY, POC: 1.01
UROBILINOGEN, POC: 0.2

## 2021-02-26 PROCEDURE — 99215 OFFICE O/P EST HI 40 MIN: CPT | Performed by: FAMILY MEDICINE

## 2021-02-26 PROCEDURE — G8399 PT W/DXA RESULTS DOCUMENT: HCPCS | Performed by: FAMILY MEDICINE

## 2021-02-26 PROCEDURE — G8484 FLU IMMUNIZE NO ADMIN: HCPCS | Performed by: FAMILY MEDICINE

## 2021-02-26 PROCEDURE — 1090F PRES/ABSN URINE INCON ASSESS: CPT | Performed by: FAMILY MEDICINE

## 2021-02-26 PROCEDURE — 1036F TOBACCO NON-USER: CPT | Performed by: FAMILY MEDICINE

## 2021-02-26 PROCEDURE — G8427 DOCREV CUR MEDS BY ELIG CLIN: HCPCS | Performed by: FAMILY MEDICINE

## 2021-02-26 PROCEDURE — 90694 VACC AIIV4 NO PRSRV 0.5ML IM: CPT | Performed by: FAMILY MEDICINE

## 2021-02-26 PROCEDURE — 86580 TB INTRADERMAL TEST: CPT | Performed by: FAMILY MEDICINE

## 2021-02-26 PROCEDURE — 1123F ACP DISCUSS/DSCN MKR DOCD: CPT | Performed by: FAMILY MEDICINE

## 2021-02-26 PROCEDURE — G8420 CALC BMI NORM PARAMETERS: HCPCS | Performed by: FAMILY MEDICINE

## 2021-02-26 PROCEDURE — G0008 ADMIN INFLUENZA VIRUS VAC: HCPCS | Performed by: FAMILY MEDICINE

## 2021-02-26 PROCEDURE — 81002 URINALYSIS NONAUTO W/O SCOPE: CPT | Performed by: FAMILY MEDICINE

## 2021-02-26 PROCEDURE — 4040F PNEUMOC VAC/ADMIN/RCVD: CPT | Performed by: FAMILY MEDICINE

## 2021-02-26 RX ORDER — LEVOTHYROXINE SODIUM 0.05 MG/1
50 TABLET ORAL DAILY
Qty: 90 TABLET | Refills: 1 | Status: SHIPPED | OUTPATIENT
Start: 2021-02-26

## 2021-02-26 ASSESSMENT — PATIENT HEALTH QUESTIONNAIRE - PHQ9
2. FEELING DOWN, DEPRESSED OR HOPELESS: 1
1. LITTLE INTEREST OR PLEASURE IN DOING THINGS: 0

## 2021-02-26 NOTE — PATIENT INSTRUCTIONS
Start taking levothyroxine 50 mcg tablet 1st thing in the morning with an empty stomach. Take with some water, do not drink or eat anything for 30 minutes after taking medication. Repeat thyroid function (blood work) in 2 months. [facility] [place] [facility] next Monday or Tuesday. I will see you back in 6 months.

## 2021-02-27 LAB
A/G RATIO: 1.1 (ref 1.1–2.2)
ALBUMIN SERPL-MCNC: 4.2 G/DL (ref 3.4–5)
ALP BLD-CCNC: 74 U/L (ref 40–129)
ALT SERPL-CCNC: 42 U/L (ref 10–40)
ANION GAP SERPL CALCULATED.3IONS-SCNC: 14 MMOL/L (ref 3–16)
AST SERPL-CCNC: 58 U/L (ref 15–37)
BASOPHILS ABSOLUTE: 0 K/UL (ref 0–0.2)
BASOPHILS RELATIVE PERCENT: 0.4 %
BILIRUB SERPL-MCNC: 0.8 MG/DL (ref 0–1)
BUN BLDV-MCNC: 15 MG/DL (ref 7–20)
CALCIUM SERPL-MCNC: 10 MG/DL (ref 8.3–10.6)
CHLORIDE BLD-SCNC: 105 MMOL/L (ref 99–110)
CO2: 22 MMOL/L (ref 21–32)
CREAT SERPL-MCNC: 0.5 MG/DL (ref 0.6–1.2)
EOSINOPHILS ABSOLUTE: 0.1 K/UL (ref 0–0.6)
EOSINOPHILS RELATIVE PERCENT: 0.7 %
ESTIMATED AVERAGE GLUCOSE: 131.2 MG/DL
GFR AFRICAN AMERICAN: >60
GFR NON-AFRICAN AMERICAN: >60
GLOBULIN: 3.7 G/DL
GLUCOSE BLD-MCNC: 183 MG/DL (ref 70–99)
HBA1C MFR BLD: 6.2 %
HCT VFR BLD CALC: 43.6 % (ref 36–48)
HEMOGLOBIN: 14.8 G/DL (ref 12–16)
LYMPHOCYTES ABSOLUTE: 3.8 K/UL (ref 1–5.1)
LYMPHOCYTES RELATIVE PERCENT: 46.8 %
MCH RBC QN AUTO: 32 PG (ref 26–34)
MCHC RBC AUTO-ENTMCNC: 33.9 G/DL (ref 31–36)
MCV RBC AUTO: 94.6 FL (ref 80–100)
MONOCYTES ABSOLUTE: 0.7 K/UL (ref 0–1.3)
MONOCYTES RELATIVE PERCENT: 9.2 %
NEUTROPHILS ABSOLUTE: 3.5 K/UL (ref 1.7–7.7)
NEUTROPHILS RELATIVE PERCENT: 42.9 %
PDW BLD-RTO: 14.1 % (ref 12.4–15.4)
PLATELET # BLD: 214 K/UL (ref 135–450)
PMV BLD AUTO: 9.6 FL (ref 5–10.5)
POTASSIUM SERPL-SCNC: 4 MMOL/L (ref 3.5–5.1)
RBC # BLD: 4.61 M/UL (ref 4–5.2)
SODIUM BLD-SCNC: 141 MMOL/L (ref 136–145)
T4 FREE: 1 NG/DL (ref 0.9–1.8)
TOTAL PROTEIN: 7.9 G/DL (ref 6.4–8.2)
TSH SERPL DL<=0.05 MIU/L-ACNC: 6.56 UIU/ML (ref 0.27–4.2)
WBC # BLD: 8 K/UL (ref 4–11)

## 2021-02-28 DIAGNOSIS — R74.01 ELEVATED TRANSAMINASE LEVEL: Primary | ICD-10-CM

## 2021-02-28 DIAGNOSIS — E03.9 HYPOTHYROIDISM, UNSPECIFIED TYPE: ICD-10-CM

## 2021-02-28 ASSESSMENT — ENCOUNTER SYMPTOMS
GASTROINTESTINAL NEGATIVE: 1
ALLERGIC/IMMUNOLOGIC NEGATIVE: 1
EYES NEGATIVE: 1

## 2021-03-01 ENCOUNTER — TELEPHONE (OUTPATIENT)
Dept: PRIMARY CARE CLINIC | Age: 76
End: 2021-03-01

## 2021-03-01 NOTE — TELEPHONE ENCOUNTER
Carine Silva from [facility] is calling to know what kind of Physical Therapy does the PT need because it doesn't state in the form for [facility]? ?

## 2021-04-04 ENCOUNTER — HOSPITAL ENCOUNTER (INPATIENT)
Age: 76
LOS: 3 days | Discharge: NURSING, REHAB OR LONG-TERM CARE | DRG: 057 | End: 2021-04-07
Attending: PSYCHIATRY & NEUROLOGY | Admitting: PSYCHIATRY & NEUROLOGY
Payer: MEDICARE

## 2021-04-04 ENCOUNTER — HOSPITAL ENCOUNTER (EMERGENCY)
Age: 76
Discharge: ANOTHER HOSPITAL | End: 2021-04-04
Attending: EMERGENCY MEDICINE
Payer: MEDICARE

## 2021-04-04 VITALS
OXYGEN SATURATION: 94 % | DIASTOLIC BLOOD PRESSURE: 83 MMHG | WEIGHT: 154 LBS | RESPIRATION RATE: 18 BRPM | HEIGHT: 66 IN | HEART RATE: 96 BPM | TEMPERATURE: 98.4 F | BODY MASS INDEX: 24.75 KG/M2 | SYSTOLIC BLOOD PRESSURE: 162 MMHG

## 2021-04-04 DIAGNOSIS — R41.0 CONFUSION: ICD-10-CM

## 2021-04-04 DIAGNOSIS — R46.89 AGGRESSIVE BEHAVIOR: Primary | ICD-10-CM

## 2021-04-04 DIAGNOSIS — Z00.8 EVALUATION BY PSYCHIATRIC SERVICE REQUIRED: ICD-10-CM

## 2021-04-04 PROBLEM — F02.C18: Status: ACTIVE | Noted: 2021-04-04

## 2021-04-04 LAB
A/G RATIO: 1.2 (ref 1.1–2.2)
ACETAMINOPHEN LEVEL: <5 UG/ML (ref 10–30)
ALBUMIN SERPL-MCNC: 4.2 G/DL (ref 3.4–5)
ALP BLD-CCNC: 88 U/L (ref 40–129)
ALT SERPL-CCNC: 27 U/L (ref 10–40)
AMPHETAMINE SCREEN, URINE: NORMAL
ANION GAP SERPL CALCULATED.3IONS-SCNC: 14 MMOL/L (ref 3–16)
AST SERPL-CCNC: 29 U/L (ref 15–37)
BACTERIA: ABNORMAL /HPF
BARBITURATE SCREEN URINE: NORMAL
BASOPHILS ABSOLUTE: 0 K/UL (ref 0–0.2)
BASOPHILS RELATIVE PERCENT: 0.6 %
BENZODIAZEPINE SCREEN, URINE: NORMAL
BILIRUB SERPL-MCNC: 0.4 MG/DL (ref 0–1)
BILIRUBIN URINE: NEGATIVE
BLOOD, URINE: NEGATIVE
BUN BLDV-MCNC: 13 MG/DL (ref 7–20)
CALCIUM SERPL-MCNC: 9 MG/DL (ref 8.3–10.6)
CANNABINOID SCREEN URINE: NORMAL
CHLORIDE BLD-SCNC: 108 MMOL/L (ref 99–110)
CLARITY: ABNORMAL
CO2: 21 MMOL/L (ref 21–32)
COCAINE METABOLITE SCREEN URINE: NORMAL
COLOR: YELLOW
CREAT SERPL-MCNC: 0.6 MG/DL (ref 0.6–1.2)
EKG ATRIAL RATE: 87 BPM
EKG DIAGNOSIS: NORMAL
EKG P AXIS: 25 DEGREES
EKG P-R INTERVAL: 204 MS
EKG Q-T INTERVAL: 380 MS
EKG QRS DURATION: 86 MS
EKG QTC CALCULATION (BAZETT): 457 MS
EKG R AXIS: 22 DEGREES
EKG T AXIS: -6 DEGREES
EKG VENTRICULAR RATE: 87 BPM
EOSINOPHILS ABSOLUTE: 0.1 K/UL (ref 0–0.6)
EOSINOPHILS RELATIVE PERCENT: 0.8 %
EPITHELIAL CELLS, UA: 5 /HPF (ref 0–5)
GFR AFRICAN AMERICAN: >60
GFR NON-AFRICAN AMERICAN: >60
GLOBULIN: 3.6 G/DL
GLUCOSE BLD-MCNC: 164 MG/DL (ref 70–99)
GLUCOSE URINE: NEGATIVE MG/DL
HCT VFR BLD CALC: 44.8 % (ref 36–48)
HEMOGLOBIN: 15.4 G/DL (ref 12–16)
HYALINE CASTS: 1 /LPF (ref 0–8)
KETONES, URINE: NEGATIVE MG/DL
LEUKOCYTE ESTERASE, URINE: NEGATIVE
LYMPHOCYTES ABSOLUTE: 3.2 K/UL (ref 1–5.1)
LYMPHOCYTES RELATIVE PERCENT: 46.3 %
Lab: NORMAL
MCH RBC QN AUTO: 32.3 PG (ref 26–34)
MCHC RBC AUTO-ENTMCNC: 34.4 G/DL (ref 31–36)
MCV RBC AUTO: 93.9 FL (ref 80–100)
METHADONE SCREEN, URINE: NORMAL
MICROSCOPIC EXAMINATION: YES
MONOCYTES ABSOLUTE: 0.6 K/UL (ref 0–1.3)
MONOCYTES RELATIVE PERCENT: 8 %
NEUTROPHILS ABSOLUTE: 3.1 K/UL (ref 1.7–7.7)
NEUTROPHILS RELATIVE PERCENT: 44.3 %
NITRITE, URINE: NEGATIVE
OPIATE SCREEN URINE: NORMAL
OXYCODONE URINE: NORMAL
PDW BLD-RTO: 13.9 % (ref 12.4–15.4)
PH UA: 6
PH UA: 6 (ref 5–8)
PHENCYCLIDINE SCREEN URINE: NORMAL
PLATELET # BLD: 169 K/UL (ref 135–450)
PMV BLD AUTO: 8.9 FL (ref 5–10.5)
POTASSIUM REFLEX MAGNESIUM: 3.9 MMOL/L (ref 3.5–5.1)
PROPOXYPHENE SCREEN: NORMAL
PROTEIN UA: NEGATIVE MG/DL
RBC # BLD: 4.77 M/UL (ref 4–5.2)
RBC UA: 1 /HPF (ref 0–4)
SALICYLATE, SERUM: <0.3 MG/DL (ref 15–30)
SARS-COV-2, NAAT: NOT DETECTED
SODIUM BLD-SCNC: 143 MMOL/L (ref 136–145)
SPECIFIC GRAVITY UA: 1.01 (ref 1–1.03)
TOTAL PROTEIN: 7.8 G/DL (ref 6.4–8.2)
URINE REFLEX TO CULTURE: ABNORMAL
URINE TYPE: ABNORMAL
UROBILINOGEN, URINE: 0.2 E.U./DL
WBC # BLD: 7 K/UL (ref 4–11)
WBC UA: 1 /HPF (ref 0–5)

## 2021-04-04 PROCEDURE — 87635 SARS-COV-2 COVID-19 AMP PRB: CPT

## 2021-04-04 PROCEDURE — 36415 COLL VENOUS BLD VENIPUNCTURE: CPT

## 2021-04-04 PROCEDURE — 85025 COMPLETE CBC W/AUTO DIFF WBC: CPT

## 2021-04-04 PROCEDURE — 93010 ELECTROCARDIOGRAM REPORT: CPT | Performed by: INTERNAL MEDICINE

## 2021-04-04 PROCEDURE — 80053 COMPREHEN METABOLIC PANEL: CPT

## 2021-04-04 PROCEDURE — 80307 DRUG TEST PRSMV CHEM ANLYZR: CPT

## 2021-04-04 PROCEDURE — 80143 DRUG ASSAY ACETAMINOPHEN: CPT

## 2021-04-04 PROCEDURE — 93005 ELECTROCARDIOGRAM TRACING: CPT | Performed by: EMERGENCY MEDICINE

## 2021-04-04 PROCEDURE — 80179 DRUG ASSAY SALICYLATE: CPT

## 2021-04-04 PROCEDURE — 81001 URINALYSIS AUTO W/SCOPE: CPT

## 2021-04-04 PROCEDURE — 1240000000 HC EMOTIONAL WELLNESS R&B

## 2021-04-04 PROCEDURE — 99285 EMERGENCY DEPT VISIT HI MDM: CPT

## 2021-04-04 PROCEDURE — 6370000000 HC RX 637 (ALT 250 FOR IP): Performed by: EMERGENCY MEDICINE

## 2021-04-04 RX ORDER — LEVOTHYROXINE SODIUM 0.03 MG/1
50 TABLET ORAL ONCE
Status: COMPLETED | OUTPATIENT
Start: 2021-04-04 | End: 2021-04-04

## 2021-04-04 RX ORDER — ZIPRASIDONE HYDROCHLORIDE 40 MG/1
40 CAPSULE ORAL ONCE
Status: COMPLETED | OUTPATIENT
Start: 2021-04-04 | End: 2021-04-04

## 2021-04-04 RX ADMIN — ZIPRASIDONE HYDROCHLORIDE 40 MG: 40 CAPSULE ORAL at 17:22

## 2021-04-04 RX ADMIN — LEVOTHYROXINE SODIUM 50 MCG: 0.03 TABLET ORAL at 12:33

## 2021-04-04 NOTE — ED NOTES
Rosalba Dawkins called back at [phone]. Patient going to Room 2206, Bed 1. Call Nurse Report to [phone]. [facility] ETA 1900.        Winston Alejandra  04/04/21 1815

## 2021-04-04 NOTE — ED NOTES
Report to [facility] care EMS. Pt. Alert and VSS upon departure.       Nico Mendes RN  04/04/21 8585

## 2021-04-04 NOTE — ED NOTES
Dietary called to order tray for patient at this time.       Spenser Estrella, PATRICK  04/04/21 2304
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
## 2021-04-04 NOTE — ED NOTES
Bed: B-09  Expected date: 4/4/21  Expected time: 10:06 AM  Means of arrival: [facility]  Comments:  75F AMS/combative from [facility], RN  04/04/21 101

## 2021-04-04 NOTE — CARE COORDINATION
yelling and moving further into other people's spaces. The staff and residents felt in danger and the police were called to take patient to hospital. Shell Albert reported that today is not the first time they have had issues with patient. She also reported that patient is continually invading other people's spaces/going into rooms. Sw spoke with patient today and tried to have a dialogue with patient regarding events of today. Patient only wanted to talk about her 5th/3rd bank account, and how she worked in [employer] and does not want her assisted checks from [employer] deposited into 5th/3rd. Sw tried to redirect to the situation of this am. Patient only stated that the people there Cyndee Melgar) are strange and they sent me here with the police. She briefly mentioned that the last place she lived [facility] also were also strange and she felt Oleta Leslie had a separate  line\". Patient could not or did not elaborate on this when asked. She went back to her discussing her banking. In addition, this 1/21/2021 chart note reflects a telephone call from Isa Escobar at [place] [address] to Wallace CrisostomoDerby, [place] in [facility] office:    Wallace Child MA  1/21/21 10:33 AM  Note      FYI:      Matthew from Adult protective services called to let know that patient will be getting evicted due to failed apartment inspections/living condition. He states that the eviction process has been going on since September 2020. There was a court trial on 01/19 nothing was settled so it got moved to february. He states that she was going to be moving to [facility] but patient changed her mind. He wanted to know if she has any cognitive problems, because she seemed to be repeating her self a lot and it's like she didn't understand that she is getting evicted. He also mentioned that she has not had a shower in sometimes. Her hair was greasy and didn't look clean.   She would not give him any information on emergency contacts or names of people next of kin, so that he can reach out to them and seek help for her. He believes that she is seeing a different PCP because she is currently taking Levothyroxine and Seroquel that was prescribed by a Dr. Froy Kwon. He states that Jaja Couch does not remember seeing Dr. Froy Kwon, he states that he cant determine if she is just confused on a lot of things for just giving him the run around. Would like for her to be seen if he can convince her of coming into our office. Sabas informed attending of above information and requested an inpatient dameon psych eval. If patient goes to inpatient psych, she will need COVID test. Amol Tuttle at [facility] called to for update; Sabas informed her patient still in ED and  eval by psych requested.     Electronically signed by SOFIA Matta, SONIA, Case Management on 4/4/2021 at 4:36 PM  Duncan Baumann [phone]

## 2021-04-04 NOTE — ED NOTES
Report from PATRICK Guallpa. Pt. Extremely agitated at this time. Pt. Ambulating in hallway using bedside table as walker. When writer attempted to redirect patient to use cane rather than bedside table because it is not safe, patient started yelling at Rivera Rubio \"I will never forget you! You treat me so bad! \" Writer informed patient that [facility] has never met patient and is just trying to keep patient safe. Pt. Pointing at other staff and continues yelling. Pt stating \"I am going to wait outside for my ride! Pt redirected by writer and security back into room at this time and informed that her ride will come however it is not safe for patient to wait outside. Pt. Verbalized understanding. Pt. Continuing to talk to security at this time while sitting in room.       Elana Leyden, RN  04/04/21 8638

## 2021-04-05 PROCEDURE — 99221 1ST HOSP IP/OBS SF/LOW 40: CPT | Performed by: NURSE PRACTITIONER

## 2021-04-05 PROCEDURE — 6370000000 HC RX 637 (ALT 250 FOR IP): Performed by: PSYCHIATRY & NEUROLOGY

## 2021-04-05 PROCEDURE — 99223 1ST HOSP IP/OBS HIGH 75: CPT | Performed by: PSYCHIATRY & NEUROLOGY

## 2021-04-05 PROCEDURE — 1240000000 HC EMOTIONAL WELLNESS R&B

## 2021-04-05 RX ORDER — HALOPERIDOL 5 MG/ML
2 INJECTION INTRAMUSCULAR EVERY 6 HOURS PRN
Status: DISCONTINUED | OUTPATIENT
Start: 2021-04-05 | End: 2021-04-07 | Stop reason: HOSPADM

## 2021-04-05 RX ORDER — DONEPEZIL HYDROCHLORIDE 5 MG/1
5 TABLET, FILM COATED ORAL
Status: DISCONTINUED | OUTPATIENT
Start: 2021-04-06 | End: 2021-04-07 | Stop reason: HOSPADM

## 2021-04-05 RX ORDER — LEVOTHYROXINE SODIUM 0.05 MG/1
50 TABLET ORAL DAILY
Status: DISCONTINUED | OUTPATIENT
Start: 2021-04-05 | End: 2021-04-07 | Stop reason: HOSPADM

## 2021-04-05 RX ORDER — MAGNESIUM HYDROXIDE/ALUMINUM HYDROXICE/SIMETHICONE 120; 1200; 1200 MG/30ML; MG/30ML; MG/30ML
30 SUSPENSION ORAL EVERY 6 HOURS PRN
Status: DISCONTINUED | OUTPATIENT
Start: 2021-04-05 | End: 2021-04-07 | Stop reason: HOSPADM

## 2021-04-05 RX ORDER — TRAZODONE HYDROCHLORIDE 50 MG/1
25 TABLET ORAL NIGHTLY PRN
Status: DISCONTINUED | OUTPATIENT
Start: 2021-04-05 | End: 2021-04-07 | Stop reason: HOSPADM

## 2021-04-05 RX ORDER — LORAZEPAM 0.5 MG/1
0.5 TABLET ORAL EVERY 6 HOURS PRN
Status: DISCONTINUED | OUTPATIENT
Start: 2021-04-05 | End: 2021-04-07 | Stop reason: HOSPADM

## 2021-04-05 RX ORDER — ACETAMINOPHEN 325 MG/1
650 TABLET ORAL EVERY 4 HOURS PRN
Status: DISCONTINUED | OUTPATIENT
Start: 2021-04-05 | End: 2021-04-07 | Stop reason: HOSPADM

## 2021-04-05 RX ORDER — HALOPERIDOL 1 MG/1
2 TABLET ORAL EVERY 6 HOURS PRN
Status: DISCONTINUED | OUTPATIENT
Start: 2021-04-05 | End: 2021-04-07 | Stop reason: HOSPADM

## 2021-04-05 RX ORDER — BENZTROPINE MESYLATE 1 MG/ML
1 INJECTION INTRAMUSCULAR; INTRAVENOUS 2 TIMES DAILY PRN
Status: DISCONTINUED | OUTPATIENT
Start: 2021-04-05 | End: 2021-04-07 | Stop reason: HOSPADM

## 2021-04-05 RX ORDER — LORAZEPAM 2 MG/ML
1 INJECTION INTRAMUSCULAR EVERY 6 HOURS PRN
Status: DISCONTINUED | OUTPATIENT
Start: 2021-04-05 | End: 2021-04-07 | Stop reason: HOSPADM

## 2021-04-05 RX ADMIN — LEVOTHYROXINE SODIUM 50 MCG: 50 TABLET ORAL at 06:25

## 2021-04-05 ASSESSMENT — SLEEP AND FATIGUE QUESTIONNAIRES: DO YOU USE A SLEEP AID: NO

## 2021-04-05 NOTE — PLAN OF CARE
Patient visible on unit in DR most of morning. Patient becomes irritable when asked questions so unable to complete admission assessment. Patient continues to refuse skin assessment. Feet seen and skin very dry. No scheduled meds. Denies SI/HI. Did not answer about AVH however no RTIS noted. Patient resting in bed. Will continue to monitor.

## 2021-04-05 NOTE — FLOWSHEET NOTE
04/05/21 1052   Encounter Summary   Services provided to: Patient   Referral/Consult From: Rounding   Continue Visiting   (4/5 initial, listened and sppt, bible)   Complexity of Encounter Moderate   Length of Encounter 15 minutes   Spiritual Assessment Completed Yes

## 2021-04-05 NOTE — BH NOTE
Patient was provided with a mask to utilize on unit. Unfortunately, due to severe cognitive impairment, patient has demonstrated an inability to comply with mask use secondary to an inability to comprehend and/or consistently recall the need for consistent use. Multiple efforts to teach patient about necessity have proven ineffective, again due to severe underlying cognitive impairment. At this point in time it has become clinically apparent that efforts to force patient to utilize mask contribute only to increased behavioral disturbance. All staff will continue to utilize masks when interacting with patient.

## 2021-04-05 NOTE — BH NOTE
Patient is not able to demonstrate the ability to move from a reclining position to an upright position within the recliner due to pt would not participate in assessment.
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
## 2021-04-05 NOTE — BH NOTE
Dada received a phone call from RayV at [facility]. She stated that the pt has lived there since February 26, 2021. She stated that they pt refuses psych & meds. She explained they were trying to wait until the pt's guardianship hearing on April 15th  ( court appointed guardian); however the pt's behaviors got out of control so they sent her to the hospital. She stated that as a result they are not going to be able to accept the pt back. DADA inquired if the pt has received a 30 day notice and/or had been evicted and she stated that she has not. DADA explained that she is not in disagreement that the pt may need a higher level of care; however by law they are required to give the pt a 30 day eviction notice and cannot just discharge pt to the hospital and refuse to accept her back. Jennifer stated she would have Eileen CRISTINA, call DADA to discuss.           JENNY Martínez

## 2021-04-05 NOTE — BH NOTE
`[facility]  Admission Note     Admission Type:   Admission Type:  Involuntary    Reason for admission:  Reason for Admission: Neurocognitive disorder with behavioral disturbance    PATIENT STRENGTHS:  Strengths: Communication    Patient Strengths and Limitations:  Limitations: Tendency to isolate self    Addictive Behavior:   Addictive Behavior  In the past 3 months, have you felt or has someone told you that you have a problem with:  : (MATHIEU, pt would not participate in assessment)  Do you have a history of Chemical Use?: (MATHIEU, pt would not participate in assessment)  Do you have a history of Alcohol Use?: (MATHIEU, pt would not participate in assessment)  Do you have a history of Street Drug Abuse?: (MATHIEU, pt would not participate in assessment)  Histroy of Prescripton Drug Abuse?: (MATHIEU, pt would not participate in assessment)    Medical Problems:   Past Medical History:   Diagnosis Date    Anxiety     Hypothyroidism     Irritable bowel syndrome     Prediabetes        Status EXAM:  Status and Exam  Normal: No  Facial Expression: Avoids Gaze, Worried  Affect: Blunt  Level of Consciousness: Alert  Mood:Normal: No  Mood: Anxious  Motor Activity:Normal: No  Motor Activity: Decreased  Interview Behavior: Evasive  Preception: [place] to Person, [place] to Time  Attention:Normal: No  Attention: Distractible  Thought Processes: Blocking  Thought Content:Normal: (MATHIEU)  Hallucinations: Unable to assess(No RTIS noted)  Delusions: (MATHIEU)  Memory:Normal: (MATHIEU)  Insight and Judgment: (MATHIEU)  Present Suicidal Ideation: No  Present Homicidal Ideation: No    Tobacco Screening:  Practical Counseling, on admission, sheridan X, if applicable and completed (first 3 are required if patient doesn't refuse):            ( )  Recognizing danger situations (included triggers and roadblocks)                    ( )  Coping skills (new ways to manage stress, exercise, relaxation techniques, changing routine, distraction) ( )  Basic information about quitting (benefits of quitting, techniques in how to quit, available resources  ( ) Referral for counseling faxed to [facility]                                           ( ) Patient refused counseling  ( ) Patient has not smoked in the last 30 days    Metabolic Screening:    Lab Results   Component Value Date    LABA1C 6.2 02/26/2021       No results for input(s): CHOL, TRIG, HDL, LDLCALC, LABVLDL in the last 72 hours. There is no height or weight on file to calculate BMI. BP Readings from Last 2 Encounters:   04/04/21 (!) 145/68   04/04/21 (!) 162/83           Pt admitted with followings belongings:  Clothing: Shirt, Pants, Undergarments (Comment), Socks  Other Valuables: Cell phone     Valuables sent home with: Cell phone in safe. . Valuables placed in safe in security envelope, number: . Patient's home medications were N/A. Patient oriented to surroundings and program expectations and copy of patient rights given. Received admission packet: Yes.   Consents reviewed, signed: No. Refused: No. Patient verbalize understanding: No.    Patient education on precautions: Davian Mariscal RN

## 2021-04-05 NOTE — H&P
Hospital Medicine History & Physical      PCP: Juliet Samaniego MD    Date of Admission: 4/4/2021    Date of Service: Pt seen/examined on 4/5/2021     Chief Complaint:  Verbal altercation with staff at assisted living facility; agitation    History Of Present Illness: The patient is a 66 y.o. female with pre-diabetes, hypothyroidism, IBS, and anxiety who presented to [facility] from her assisted living faciltiy with complaint of verbal altercation with the staff. Patient was seen and evaluated in the ED by the ED medical provider, patient was medically cleared for admission to [facility]. This note serves as an admission medical H&P.    PCP: Juliet Samaniego MD  Tobacco use: never  ETOH use: denies  Illicit drug use: denies    Patient denies any symptoms/complaints. Past Medical History:        Diagnosis Date    Anxiety     Hypothyroidism     Irritable bowel syndrome     Prediabetes        Past Surgical History:        Procedure Laterality Date    HYSTERECTOMY         Medications Prior to Admission:    Prior to Admission medications    Medication Sig Start Date End Date Taking? Authorizing Provider   levothyroxine (SYNTHROID) 50 MCG tablet Take 1 tablet by mouth daily 2/26/21   Juliet Samaniego MD       Allergies:  Patient has no known allergies. Social History:     TOBACCO:   reports that she has never smoked. She has never used smokeless tobacco.  ETOH:   reports no history of alcohol use.       Family History:   Positive as follows:        Problem Relation Age of Onset    Thyroid Disease Sister     No Known Problems Sister     No Known Problems Son     No Known Problems Son     No Known Problems Daughter        REVIEW OF SYSTEMS:       Constitutional: Negative for fever   HENT: Negative for sore throat   Respiratory: Negative  for dyspnea, cough   Cardiovascular: Negative for chest pain   Gastrointestinal: Negative for vomiting, diarrhea Genitourinary: Negative for dysuria  Musculoskeletal: Negative for arthralgias   Skin: Negative for rash   Neurological: Negative for syncope   Psychiatric/Behavorial: per psychiatric evaluation    PHYSICAL EXAM:    BP (!) 154/88   Pulse 84   Temp 97.7 °F (36.5 °C) (Temporal)   Resp 16   SpO2 93%     Gen: No distress. Alert. Eyes: PERRL. No sclera icterus. No conjunctival injection. ENT: No discharge. Pharynx clear. Neck: No JVD. No Carotid Bruit. Trachea midline. Resp: No accessory muscle use. No crackles. No wheezes. No rhonchi. CV: Regular rate. Regular rhythm. No murmur. No rub. No edema. GI: Non-tender. Non-distended. Normal bowel sounds. Skin: Warm and dry. No nodule on exposed extremities. No rash on exposed extremities. M/S: No cyanosis. No joint deformity. No clubbing. Neuro: Awake. No focal neurologic deficit on exam.  Cranial nerves II through XII intact. Patient is able to ambulate without difficulty. Psych: Per psychiatry team evaluation       CBC:   Recent Labs     04/04/21  1434   WBC 7.0   HGB 15.4   HCT 44.8   MCV 93.9        BMP:   Recent Labs     04/04/21  1434      K 3.9      CO2 21   BUN 13   CREATININE 0.6     LIVER PROFILE:   Recent Labs     04/04/21  1434   AST 29   ALT 27   BILITOT 0.4   ALKPHOS 88     UA:  Recent Labs     04/04/21  1302   COLORU YELLOW   PHUR 6.0  6.0   WBCUA 1   RBCUA 1   BACTERIA 4+*   CLARITYU CLOUDY*   SPECGRAV 1.010   LEUKOCYTESUR Negative   UROBILINOGEN 0.2   BILIRUBINUR Negative   BLOODU Negative   GLUCOSEU Negative         EKG:  I have reviewed the EKG with the following interpretation:   NSR, t wave inversions      ASSESSMENT/PLAN:  Hyperglycemia   - HgbA1C pending    Hypothyroidism  - home dose of synthroid 50 mcg     T wave inversions on EKG  - repeat EKG. Neurocognitive disorder with behavioral disturbance  - management per psychiatry. Pt has no medical complaints at this time.  They were informed that should a

## 2021-04-05 NOTE — GROUP NOTE
Group Therapy Note    Date: 4/5/2021    Group Start Time: 6607  Group End Time: [address]  Group Topic: Recreational    [facility]        Group Therapy Note    Attendees: 3    Patient's Goal: to continue coloring decorations for flower pot and socializing to improve mood, engage in preferred leisure, and increase socialization. Notes:  Sai Rueda actively engaged in group throughout and achieved group goal. Pt socialized regarding [place] food and Christianity often. Status After Intervention:  Improved    Participation Level:  Active Listener and Interactive    Participation Quality: Appropriate and Attentive      Speech:  normal      Thought Process/Content: confused      Affective Functioning: Constricted/Restricted      Mood: anxious      Level of consciousness:  Alert and Attentive      Response to Learning: Able to verbalize current knowledge/experience and Progressing to goal      Endings: None Reported    Modes of Intervention: Support, Socialization and Activity      Discipline Responsible: Psychoeducational Specialist      Signature:  RAFAELA Peter

## 2021-04-05 NOTE — BH NOTE
[facility]  Treatment Team Note  Review Date & Time: 4/5/2021  942    Patient was not in treatment team      Status EXAM:   Status and Exam  Normal: No  Facial Expression: Avoids Gaze  Affect: Congruent  Level of Consciousness: Alert  Mood:Normal: No  Mood: Irritable  Motor Activity:Normal: Yes  Motor Activity: Decreased  Interview Behavior: Irritable  Preception: [place] to Person  Attention:Normal: No  Attention: Distractible  Thought Processes: Perseveration  Thought Content:Normal: No  Thought Content: Poverty of Content  Hallucinations: Unable to assess  Delusions: No  Memory:Normal: No  Memory: Confabulation  Insight and Judgment: No  Insight and Judgment: Poor Judgment, Poor Insight  Present Suicidal Ideation: No  Present Homicidal Ideation: No      Suicide Risk CSSR-S:  1) Within the past month, have you wished you were dead or wished you could go to sleep and not wake up? : (MATHIEU, pt would not participate in assessment)  2) Have you actually had any thoughts of killing yourself? : (MATHIEU, pt would not participate in assessment)  6) Have you ever done anything, started to do anything, or prepared to do anything to end your life?: (MATHIEU, pt would not participate in assessment)      PLAN/TREATMENT RECOMMENDATIONS UPDATE: Evaluate and treat          Ivy Carrillo RN

## 2021-04-05 NOTE — GROUP NOTE
Group Therapy Note    Date: 4/5/2021    Group Start Time: 7866  Group End Time: 7859  Group Topic: Recreational    [facility] [address]        Group Therapy Note    Attendees: 1    Patient's Goal: To actively engage in playing the game Totad to promote relationship building, reminiscing, increased self-esteem, practicing coping with anxiety, laughter, improved fine motor skills, practicing patience, and overall mood improvement. Notes: Harshal Wilson actively participated in group intervention. Harshal Wilson socialized about her culture, Yazidism background, and past memories. Pt demonstrated loose association, while socializing. Pt appears to be making positive progress. Status After Intervention:  Improved    Participation Level:  Active Listener and Interactive    Participation Quality: Appropriate, Attentive and Sharing      Speech:  normal      Thought Process/Content: Tangential      Affective Functioning: Congruent      Mood: anxious      Level of consciousness:  Alert and Attentive      Response to Learning: Capable of insight, Able to change behavior and Progressing to goal      Endings: None Reported    Modes of Intervention: Education, Support, Socialization, Exploration, Clarifying, Problem-solving and Activity      Discipline Responsible: Psychoeducational Specialist      Signature:  Skippy Eisenmenger, CTRS

## 2021-04-05 NOTE — BH NOTE
DADA received a call from Edna Cramer with senior services. She stated that guardianship paperwork hss been field and court is on April 19, 2021. ( [phone]) She stated that she doesn't have much history on pt. She stated that the APS  Lazara Solano  ([phone])  is whom helped place pt in [place] because she was being evicted from her previous home due to hoarding. She stated that the pt has started hoarding at [facility]. Murali Hanks explained that the pt has been making a lot of derogatory comments to black staff at [facility] bring staff to tears and resulting in them having to leave the facility. She stated the pt has reported having family in [place]; however they have never been able to get in touch with them  even though she has providing them with phone #'s. Murali Hanks stated she thinks the pt may be estranged from her family. Murali Hanks stated that she is concerned that pt's placement may be disrupted. DADA updated her on her conversation with [facility] and made aware that the pt has a legal right to return as she has not been provided a 30 days notice, etc. Murali Hanks stated she spoke with Ernestina at [facility] and was told they would have to come assess pt prior to allowing her to return. Murali Hanks provided DADA with the phone # to Ernestina  [phone].               JENNY Martínez

## 2021-04-05 NOTE — GROUP NOTE
Group Therapy Note    Date: 4/5/2021    Group Start Time: 1000  Group End Time: 6426  Group Topic: Recreational    [address]        Group Therapy Note    Attendees: 3    Patient's Goal: To practice self-care by engaging in gardening, decorating flower pots, and listening to music. To discuss the benefits of engaging in self-care and to identify self-care activities to participate in, to improve; overall health, well being, and quality of life. Notes: Julianne Apley was excused from approximately 25 minutes of group, due to meeting with MD. Pt actively engaged in gardening, reminiscing, and socializing with peers. Pt ran out of time to complete intervention. Pt was offered to complete group intervention in 1045 Recreational Group. Status After Intervention:  Improved    Participation Level:  Active Listener and Interactive    Participation Quality: Appropriate, Attentive and Sharing      Speech:  normal      Thought Process/Content: Tangential      Affective Functioning: Congruent      Mood: anxious      Level of consciousness:  Alert and Attentive      Response to Learning: Capable of insight, Able to change behavior and Progressing to goal      Endings: None Reported    Modes of Intervention: Education, Support, Socialization, Exploration, Clarifying, Problem-solving and Activity      Discipline Responsible: Psychoeducational Specialist      Signature:  Ansel Gowers, CTRS

## 2021-04-05 NOTE — FLOWSHEET NOTE
04/05/21 1004   Activities of Daily Living   Patient Requires assistance with daily self-care activities? Yes   Patient identified needing assistance with: Meal Planning;Money Management;Transportation;Grocery Shopping;Laundry; Food Preparation   Person Assisting  or Other professional caregiver   Person Assisting details ECF   Leisure Activity 1   3 Favorite Leisure Activities painting   Frequency   (MATHIEU due to cognitive status)   Last time   (MATHIEU due to cognitive status)   Barriers to participating    (MATHIEU due to cognitive status)   Leisure Activity 2   Favorite Leisure Activities  cooking and eating   Frequency    (MATHIEU due to cognitive status)   Last time    (MATHIEU due to cognitive status)   Barriers to participating    (MATHIEU due to cognitive status)   Leisure Activity 3   [address]  conversation about traveling   Frequency  <2 hours/day   Last time  today   Barriers to participating  social (ie. no one to do it with)   Social   Patient reports spending the majority of their free time alone   Patient verbalizes a preference for spending free time alone   Patients perception of support system less healthy   Patients perception of barriers to socializing with others include(s) no perceived barriers  (\"not very much\")   Social Details In reference to support system, pt stated Tomdunghamohinder, I am limited. \" Pt shared her family lives in [place] and she would like to go live with them, but it is too expensive.    Beliefs & Coping   Has difficulty dealing with feelings   No   Internalizes feelings/Keeps feelings in Yes   Externalizes feelings through aggressiveness or poor temper control  No   Feels uncomfortable around others  No   Has difficulty talking to others  No   Depends on others for direction or decisions No   Difficulty dealing with anger of others  No   Difficulty dealing with own anger  No   Difficulty managing stress No   Frequently has difficulty with relationships  No Has recently perceived/experienced loss, disappointment, humiliation or failure  NO   General perception about self   (\"I think God will use me\")   Attitude about abilities   (\"I was good in [facility], but not in [facility]\")   Locus of Control  most of the time   Belief about recovery Recovery is possible   Patient Identified Strengths  I just like a lot of things   Patient Identified Limitations  I don't have a lot of money   Perception of most stressful event prior to hospitalization When prompted to share what type of building this is, pt stated an \"international activity initiation group. \" per chart review, \"Sw received message back from [facility] ([phone]). She stated that she sent patient to the hospital for psych placement due to being a danger self and others. She reported patient was verbally abusive this am to staff and residents. When they tried to redirect her she started stopping her feet, yelling and moving further into other people's spaces. The staff and residents felt in danger and the police were called to take patient to hospital\"   Perception of changes needed \"to take a look\" - per chart review, behavior management and medication stabilization are key goals for hospitalization. Strengths and Limitations   Strengths Educated;Demonstrates basic social skills   Limitations Tendency to isolate self;Perceives need for assistance with self-care     RAFAELA met with pt to complete AT/OT Leisure Assessment.     RAFAELA Ramires

## 2021-04-06 PROCEDURE — 97166 OT EVAL MOD COMPLEX 45 MIN: CPT

## 2021-04-06 PROCEDURE — 97535 SELF CARE MNGMENT TRAINING: CPT

## 2021-04-06 PROCEDURE — 6370000000 HC RX 637 (ALT 250 FOR IP): Performed by: PSYCHIATRY & NEUROLOGY

## 2021-04-06 PROCEDURE — 99233 SBSQ HOSP IP/OBS HIGH 50: CPT | Performed by: PSYCHIATRY & NEUROLOGY

## 2021-04-06 PROCEDURE — 97530 THERAPEUTIC ACTIVITIES: CPT

## 2021-04-06 PROCEDURE — 1240000000 HC EMOTIONAL WELLNESS R&B

## 2021-04-06 RX ORDER — RISPERIDONE 0.5 MG/1
0.5 TABLET, ORALLY DISINTEGRATING ORAL 2 TIMES DAILY
Status: DISCONTINUED | OUTPATIENT
Start: 2021-04-06 | End: 2021-04-07 | Stop reason: HOSPADM

## 2021-04-06 RX ADMIN — LEVOTHYROXINE SODIUM 50 MCG: 50 TABLET ORAL at 06:26

## 2021-04-06 RX ADMIN — RISPERIDONE 0.5 MG: 0.5 TABLET, ORALLY DISINTEGRATING ORAL at 20:43

## 2021-04-06 NOTE — GROUP NOTE
Group Therapy Note    Date: 4/6/2021    Group Start Time: 5413  Group End Time: 1400  Group Topic: Psychoeducation    [facility]        Group Therapy Note    Attendees: 3    Patient's Goal: pts were encouraged to engage in coloring and practicing gratitude to improve mood, increase socialization, and increase identification of what gratitude does for mental health. Notes:  Janet Najera actively engaged in coloring and socializing throughout. Pt was redirected from inappropriate conversation at times. Janet Najera stated she is grateful for good food.      Status After Intervention:  Improved    Participation Level: Interactive and Monopolizing    Participation Quality: Attentive, Sharing and Inappropriate      Speech:  loud      Thought Process/Content: Perseverating      Affective Functioning: Constricted/Restricted      Mood: anxious      Level of consciousness:  Alert and Attentive      Response to Learning: Able to verbalize current knowledge/experience and Progressing to goal      Endings: None Reported    Modes of Intervention: Education, Support, Socialization, Clarifying, Activity and Media      Discipline Responsible: Psychoeducational Specialist      Signature:  RAFAELA Pryor

## 2021-04-06 NOTE — BH NOTE
DADA called the Alessandro Gould at [facility]. Keary Baumgarten stated the pt is very fixated on food and constantly stated that she hasn't been fed and/or provided with food. Keary Baumgarten stated that they have even purchased her a rice cooker for her room and buy her [facility] takeout hoping to please her. In addition, Keary Baumgarten stated that they came up with a system where they have her sign meal tickets when she is given food. Prior to being sent to the hospital she was upset about not being fed and when the nurse showed her the signed meal ticket she became upset stating it was not her signature  and followed the nurse around and entered various pt's room and was not able to be redirected. Keary Baumgarten stated she was yelling at a pt at that time for taking medications and name naming calling him & putting him down. Keary Baumgarten stated she even at one point  blocked the nurse in the nurses station. Eileen confirmed that the pt has even swung her fist at staff before; however has never made contact. Keary Baumgarten stated that the pt is very clear that she doesn't like black people and has directly told Keary Baumgarten herself not to speak to her because she doesn't like black people. Rubi Pérez stated that comments like that are something you have to just brush off in this profession. Robin Burr stated that the pt has been starting some hoarding behaviors, such as taking used gloves from the trash can to save and keeping several  milks. Keary Baumgarten stated that they feel the pt needs medications; however cannot get her to comply with any there without a guardian. Jeremiah Clifford stated if the pt continues to refuse medications then they will put her on a one to one.           JENNY Martínez

## 2021-04-06 NOTE — PROGRESS NOTES
Department of Psychiatry  Attending Progress Note    Admission Date:    4/4/2021    Chief complaint / Reason for Admission:  Behavioral disturbance    Patient's chart was reviewed, case was discussed with nursing/OT/RT staff, and collaborated with  about the treatment plan. SUBJECTIVE:   Over last 24 hours:  Behavioral outbursts: No   Non-aggressive behavioral disturbance: No  Medication compliant: No  Need for seclusion/restraints: No  Sleeping adequately:  Yes  Appetite adequate: Yes  Attending groups: Yes    Patient exhibited no significant behavioral disturbance yesterday. She did refuse to take first dose of donepezil, though she complied with her levothyroxine. Pt presented as somewhat elevated today relative to yesterday. Her speech was rapid, and frequently loud, though she was not yelling, moreso excitable. She perseverated on couple of themes over and over without any insight into the fact she had made identical statements repeatedly. Specifically, she was focused on frustrations with being an immigrant in the [facility]. Specifically, she was critical of [address] [place] [address] for generally speaking only one language, discussed feeling as though she is not understood culturally, and claimed several times that, while she could want to go back to [facility] [place], she was told that [facility] [place] does not want expats to return. She also made several statements that were grandiose. At one point she became focused on talking about Walter Aguirre and suggested that she has a personal relationship with him due to writing a letter to him. 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 You cannot be the leader. \"    She continues to refuse additional medications beyond levothyroxine stating that she thinks \"the [place]\" gives people too much medications which turn people into \"plants\" by which is seems she means \"vegetative\" or sedated/drugged.     Progressing overall: No change  Suicidal ideation: Denies  Homicidal ideation: Denies  Medication side effects: N/A    ROS: Patient has new complaints: no    Current Medications Ordered:   risperiDONE  0.5 mg Oral BID    levothyroxine  50 mcg Oral Daily    donepezil  5 mg Oral Daily with breakfast      PRN Meds: acetaminophen, magnesium hydroxide, aluminum & magnesium hydroxide-simethicone, LORazepam **OR** LORazepam, haloperidol lactate **OR** haloperidol, traZODone, benztropine mesylate     Objective:     PE:    /68   Pulse 80   Temp 98.6 °F (37 °C) (Temporal)   Resp 16   SpO2 92%       Motor / Gait: No PMA/PMR, no involuntary movements, gait non-ataxic, aided by cane     Mental Status Examination:    Appearance:  female, appears stated age, wearing casual clothing, good grooming and hygiene   Behavior/Attitude toward examiner:  Partially cooperative, at times argumentative  Speech: rapid rate, elevated volume, excessive/verbose amount, +pressured  Mood:  Anxious  Affect:  Elevated  Thought processes:  Highly perseverative  Thought Content: Denies SI, Denies HI, +grandiosity, +confabulation  Perceptions: Denies AVH, not RTIS  Attention: impaired  Abstraction: concrete  Cognition: Oriented to self, month, year, recall impaired  Insight: Impaired insight   Judgment: Impaired judgment      LAB: Reviewed labs from last 24 hours      Primary Psychiatric (DSM V) Diagnosis: Major neurocognitive disorder due to alzheimer's disease, moderate, with behavioral disturbance  Secondary Psychiatric (DSM V) Diagnoses: None  Chemical Dependency Diagnoses: None  Active Medical Diagnoses: Hypothyroidism     All conditions detailed above are being treated while patient is hospitalized.      Tx plan: Generally: prevent self injury/aggression, stabilize mood/anxiety/psychotic/behavioral disturbance, establish/maintain aftercare, increase coping mechanisms, improve medication compliance.  All conditions present on admission are being treated while pt is hospitalized.      Legal Status: Involuntary, day 2/3. Patient declined voluntary today.     Primary Psychiatric Issues:  1. Dementia with behavioral disturbance:  Patient has never been formally evaluated for dementia, but her history, clinical presentation and head imaging are consistent with this condition, most likely a mixed AD/vascular etiology given head CT findings.  -continue to offer donepezil 5 mg daily with breakfast  -Begin to offer risperidone 0.5 mg BID for behavioral disturbance, though I anticipate patient will likely decline these medications. Chemical Dependency Issues:  No issues     Function:  -Consulted physical therapy  -Consulted occupational therapy  -Falls precautions     Medical Problems:  Internal medicine has been consulted. Appreciate recs.     1. Hypothyroidism:  Continue home synthroid     Code Status: Full     Disposition:    -Housing: [place]  -Current outpatient follow-up: PCP, would benefit from at least neuology referral as well. Total face to face time with patient was 35 minutes and more than 50 % of that time was spent counseling the patient on their symptoms, treatment and expected goals.     Radha Fuentes MD  Staff Psychiatrist

## 2021-04-06 NOTE — PLAN OF CARE
Problem: Falls - Risk of:  Goal: Will remain free from falls  Description: Will remain free from falls  4/6/2021 0808 by Surinder Townsend RN  Outcome: Ongoing  Goal: Absence of physical injury  Description: Absence of physical injury  Outcome: Ongoing     Problem: Anger Management/Homicidal Ideation:  Goal: Able to display appropriate communication and problem solving  Description: Able to display appropriate communication and problem solving  Outcome: Ongoing  Goal: Ability to verbalize frustrations and anger appropriately will improve  Description: Ability to verbalize frustrations and anger appropriately will improve  4/6/2021 0808 by Surinder Townsend RN  Outcome: Ongoing  Goal: Absence of angry outbursts  Description: Absence of angry outbursts  Outcome: Ongoing  Goal: Absence of homicidal ideation  Description: Absence of homicidal ideation  Outcome: Ongoing  Goal: Participates in care planning  Description: Participates in care planning  Outcome: Ongoing  Goal: Patient specific goal  Description: Patient specific goal  Outcome: Ongoing     Problem: Altered Mood, Deterioration in Function:  Goal: Ability to perform activities of daily living will improve  Description: Ability to perform activities of daily living will improve  Outcome: Ongoing  Goal: Able to verbalize reality based thinking  Description: Able to verbalize reality based thinking  Outcome: Ongoing  Goal: Skin appearance normal  Description: Skin appearance normal  Outcome: Ongoing  Goal: Maintenance of adequate nutrition will improve  Description: Maintenance of adequate nutrition will improve  Outcome: Ongoing  Goal: Ability to tolerate increased activity will improve  Description: Ability to tolerate increased activity will improve  Outcome: Ongoing  Goal: Participates in care planning  Description: Participates in care planning  Outcome: Ongoing  Goal: Patient specific goal  Description: Patient specific goal  Outcome: Ongoing     Problem: Altered Mood, Psychotic Behavior:  Goal: Able to demonstrate trust by eating, participating in treatment and following staff's direction  Description: Able to demonstrate trust by eating, participating in treatment and following staff's direction  Outcome: Ongoing  Goal: Able to verbalize decrease in frequency and intensity of hallucinations  Description: Able to verbalize decrease in frequency and intensity of hallucinations  Outcome: Ongoing  Goal: Able to verbalize reality based thinking  Description: Able to verbalize reality based thinking  Outcome: Ongoing  Goal: Absence of self-harm  Description: Absence of self-harm  Outcome: Ongoing  Goal: Ability to achieve adequate nutritional intake will improve  Description: Ability to achieve adequate nutritional intake will improve  Outcome: Ongoing  Goal: Ability to interact with others will improve  Description: Ability to interact with others will improve  Outcome: Ongoing  Goal: Compliance with prescribed medication regimen will improve  Description: Compliance with prescribed medication regimen will improve  Outcome: Ongoing  Goal: Patient specific goal  Description: Patient specific goal  Outcome: Ongoing    Patient is interactive with staff. Patient preoccupied this morning about getting discharged today, and going back to [facility]. 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 \" Patient denies SI/HI/AVH. Patient Alert and Oriented x3 - Patient states, \"I'm in the hospital because of my PCP. \" She reports feeling \"tired. \" Patient noncompliant with medications, and refused vitals/assessment this AM. Patient is cooperative and irritable at times.

## 2021-04-06 NOTE — BH NOTE
Lauren Cintron was invited and encouraged to attend 1000 Recreation Group. Pt declined invitation, stating she was tired and waiting for the doctor. Pt did not attend.      Delvin Zelaya, MT-BC

## 2021-04-06 NOTE — PROGRESS NOTES
Inpatient Occupational Therapy  Evaluation and Treatment    Unit:   [facility]  Date:  4/6/2021  Patient Name:    Herlinda Quinonez  Admitting diagnosis:  Major neurocognitive disorder, due to multiple etiologies, with behavioral disturbance, severe ([address] [F02.81]  Admit Date:  4/4/2021  Precautions/Restrictions/WB Status/ Lines/ Wounds/ Oxygen:  Standard BHI Precautions  Fall Risk  History of Present Illness:  Per Dr. María Flores 4/5/21 note-Patient is a 66 y.o. female with no formal past psychiatric history who was transferred from [facility] for behavioral disturbance.     Per ED documentation:  Patient \"presents to the [facility] MEDICAL police officers from her senior living facility, [facility], after there was a verbal altercation with the staff. Curtis Sierra states that she was not given breakfast this morning and that she was also not given her levothyroxine which she supposed to take daily. Curtis Sierra states that she is not feel safe at her senior living facility and that when she confronted the staff that they refused to give her food or medication. \"     Further:  \"Patient has an [facility]  named Javy Adams who assisted her in moving from her [facility] to [facility] in February 2021 (where patient currently resides).       Sw called [employer] ([facility]) and spoke with [facility]. .. Ms. Lauren Yeh reported that Marioaileen Sorensonch worked with patient to relocate to [facility] after she was evicted from [facility] for going into other resident rooms unannounced and damaging rooms. She also noted that patient was informed several times of the eviction but did not seem to understand the eviction or the cause being her behaviors.  Kevin Lipscomb also reported that patient has been verbally abusive to outside agencies such as [facility], resulting in termination of services. Ms. Lauren Yeh reported that Javy Adams notes reflect that patient refused to go to MD and that patient most likely has Dementia but not a formal diagnosis.  Ms Lauren Yeh is in floor: No steps  Steps to second floor: N/A  Bathroom: walk in shower, grab bars and standard height commode  Equipment owned: rollator and quad cane    Preadmission Status:  Pt. Able to drive: No  Pt Fully independent with ADLs: No -staff attempted assist with shower but pt refused. Reports someone from [facility] was helping at times. Pt. Required assistance from staff for: Bathing and Cooking , reports she was doing her laundry  Pt. independent for transfers and gait and walked with Rollator  History of falls Yes-reports a fall when she moved into her current facility-states it was because the food was not up to par. Was unable to elaborate    Sleep Hygiene:  Reports no issues  Income: Reports she has savings. She had difficulty getting to the [facility] office so she does not receive any SSI or penitentiary. Financial Management:  Reports she pays bills herself  Leisure Interests:  Likes to watch ad[employer]age films  Medication Management: Facility manages  Health Management:  [facility] Ankit Clancy  Social Network: Has people at [facility] that \"do not help you\", although she stated earlier that someone assisted her with shower at times  Stressors:  Being here at the facility  Coping Skills: goes to [facility]    Pain   Yes  Rating:NA  Location: neck-\"thyroid area\"-not able to elaborate, reports it hurts because it is hard to communicate. Pt perseverated on difficulty communication throughout tx. Pain Medicine Status: No request made      Cognition    A&O to Person, Place and Situation and time generally  Able to follow:  2 step commands    Upper Extremity ROM:    WFL, pt able to perform all bed mobility, transfers, and gait without ROM limitation. Upper Extremity Strength:    WFL, pt able to perform all bed mobility, transfers, and gait without strength limitation. Upper Extremity Sensation:    No apparent deficits. Upper Extremity Proprioception:    No apparent deficits.     Skin Integrity:  No issues noted     Coordination and Tone:  No problem noted    Balance  Static Sitting:  Normal  Dynamic Sitting: Normal  Static Standing:  Good   Dynamic Standing: Good -     Bed mobility:    Supine to sit:   Not Tested  Sit to supine:   Not Tested  Scooting to head of bed: Not Tested   Scooting in sitting:  Independent  Rolling:   Not Tested  Bridging:   Not Tested    Transfers:    Sit to stand:   Independent  Stand to sit: Independent  Bed/Chair to/from chair: Supervision with platform cane. Declined transfer to toilet at this time. Self Care: Toileting: Not Tested  Grooming: Not Tested  Dressing: Independent with donning shoes    Exercise / Activities Initiated:   Pt. Educated on role of OT. Pt. Participated in:  [address] [place] [address]    Assessment of Deficits:   Pt demonstrated decreased activity tolerance, decreased balance,  decreased bed mobility, decreased transfer skills, and decreased ADL/IADL status, decreased coping skills, decreased cognition, limited education    Pt. Limited during evaluation by . . . Perseveration on going back to [facility], communication and transportation difficulties here in [facility]. At end of evaluation, pt. In gathering room. Goal(s) : To be met in 3 Visits:  1). Pt. To complete interest check list.   2). Pt will participate in ACLS assessment. To be met in 5 Visits:  1). Pt will verbalize 3 coping skills  2). Pt. To verbalize understanding of sleep hygiene education/handouts. Rehabilitation Potential:  Good for goals listed above. Strengths for achieving goals include:  PLOF and No significant illness present  Barriers to achieving goals include: Decreased coping skills, Decreased cognition and Limited education    Plan: To be seen 2-5x/week while in acute care setting for therapeutic exercises/act, ADL retraining, NMR and patient/caregiver ed/instruction.        Signature and License Number  Estevan Beyer MS, OTR/L  #[id number]           If patient discharges from this facility prior to next visit, this note will serve as the Discharge Summary

## 2021-04-06 NOTE — PLAN OF CARE
Problem: Falls - Risk of:  Goal: Will remain free from falls  Description: Will remain free from falls  Outcome: Ongoing     Problem: Anger Management/Homicidal Ideation:  Goal: Ability to verbalize frustrations and anger appropriately will improve  Description: Ability to verbalize frustrations and anger appropriately will improve  Outcome: Ongoing    Pt has been pleasant and cooperative with staff. Social with peers. She denies SI/HI/AVH and no RTIS noted. She was talking about her culture and her family. She said that she is at [place], it is April and the day after Easter. She accepted snacks and drinks. Denies needs at this time. Pt is currently resting in bed with fall precautions in place.

## 2021-04-07 VITALS
SYSTOLIC BLOOD PRESSURE: 138 MMHG | OXYGEN SATURATION: 96 % | TEMPERATURE: 98.2 F | DIASTOLIC BLOOD PRESSURE: 86 MMHG | HEART RATE: 71 BPM | RESPIRATION RATE: 18 BRPM

## 2021-04-07 PROBLEM — G30.9 MAJOR NEUROCOGNITIVE DISORDER DUE TO ALZHEIMER'S DISEASE, WITH BEHAVIORAL DISTURBANCE (HCC): Status: ACTIVE | Noted: 2021-04-04

## 2021-04-07 PROBLEM — F02.818 MAJOR NEUROCOGNITIVE DISORDER DUE TO ALZHEIMER'S DISEASE, WITH BEHAVIORAL DISTURBANCE (HCC): Status: ACTIVE | Noted: 2021-04-04

## 2021-04-07 PROCEDURE — 6370000000 HC RX 637 (ALT 250 FOR IP): Performed by: PSYCHIATRY & NEUROLOGY

## 2021-04-07 PROCEDURE — 5130000000 HC BRIDGE APPOINTMENT

## 2021-04-07 PROCEDURE — 99239 HOSP IP/OBS DSCHRG MGMT >30: CPT | Performed by: PSYCHIATRY & NEUROLOGY

## 2021-04-07 RX ORDER — RISPERIDONE 0.5 MG/1
0.5 TABLET, FILM COATED ORAL 2 TIMES DAILY
Qty: 60 TABLET | Refills: 0 | Status: SHIPPED | OUTPATIENT
Start: 2021-04-07 | End: 2021-12-27

## 2021-04-07 RX ORDER — DONEPEZIL HYDROCHLORIDE 5 MG/1
5 TABLET, FILM COATED ORAL
Qty: 30 TABLET | Refills: 0 | Status: SHIPPED | OUTPATIENT
Start: 2021-04-08

## 2021-04-07 RX ADMIN — DONEPEZIL HYDROCHLORIDE 5 MG: 5 TABLET, FILM COATED ORAL at 08:17

## 2021-04-07 RX ADMIN — LEVOTHYROXINE SODIUM 50 MCG: 50 TABLET ORAL at 06:09

## 2021-04-07 RX ADMIN — RISPERIDONE 0.5 MG: 0.5 TABLET, ORALLY DISINTEGRATING ORAL at 08:17

## 2021-04-07 NOTE — PLAN OF CARE
Patient relaxed,visible for group. Patient focused on the MD and getting out of here. Med compliant. Oriented x3 except date. Patient denies SI/HI Will continue to monitor. [address].

## 2021-04-07 NOTE — BH NOTE
Nura Swift declined invitation to [facility] due to waiting to meet with MD.    RAFAELA Ramires

## 2021-04-07 NOTE — DISCHARGE SUMMARY
Geriatric Psychiatry Discharge Summary     Admit Date: 4/4/2021     Discharge Date: 4/7/2021       Discharge Diagnoses:  Primary Psychiatric (DSM V) Diagnosis: Major neurocognitive disorder due to alzheimer's disease, moderate, with behavioral disturbance  Secondary Psychiatric (DSM V) Diagnoses: None  Chemical Dependency Diagnoses: None  Active Medical Diagnoses: Hypothyroidism    All psychiatric conditions and active medical problems above on were treated while patient was hospitalized. Disposition -  [facility]     Discharge Meds:       Medication List      START taking these medications    donepezil 5 MG tablet  Commonly known as: ARICEPT  Take 1 tablet by mouth daily (with breakfast)  Start taking on: April 8, 2021     risperiDONE 0.5 MG tablet  Commonly known as: RisperDAL  Take 1 tablet by mouth 2 times daily        CONTINUE taking these medications    levothyroxine 50 MCG tablet  Commonly known as: SYNTHROID  Take 1 tablet by mouth daily           Where to Get Your Medications      You can get these medications from any pharmacy    Bring a paper prescription for each of these medications  · donepezil 5 MG tablet  · risperiDONE 0.5 MG tablet         Multiple Neuroleptics? No    Reason for admission: Patient is an 66 y.o. female with no formal past psychiatric history who was admitted to the the older adult behavioral unit on 4/4/2021 for behavioral disturbance. Patient met with and was treated by an interdisciplinary treatment team that included social work, occupational therapy, recreational therapy, nursing, and psychiatry. Patient was admitted on a involuntary basis and subsequently discharged at the conclusion of her hold. [facility] Course:    1. Alzheimer's Dementia with Behavioral Disturbance:  Patient was sent to the ED from her assisted living facility, [facility] ([facility]) for behavioral disturbance.   She was placed on a psychiatric hold in the ED and transferred to our unit for further assessment and management. Initial history revealed that patient has no family in the [facility], no support system, and no previous known psychiatric history. She was evidently evicted from a senior apartment building at the beginning of the year related to hoarding, and has an active APS  who made arrangements for her admission to [facility] in February. Additionally, APS is currently pursuing guardianship and patient has a pending guardianship hearing on 4/19. Since admission to [facility], though, patient was reported to be intrusive, argumentative, and insulting to peers and staff. She got into a verbal altercation with staff the morning of her presentation. Unfortunately, we were never able to clarify, even after speaking directly with facility staff, what about that episode specifically led them to send her to the ED compared to previous episodes of argumentativeness. Finally, it was noted both by APS and facility that patient had not been formally diagnosed with dementia or any other psychiatric condition related to her refusal to engage in a psychiatric assessment. Unclear when or through whom this had been attempted. Assessment on our unit was consistent with dementia with behavioral disturbance, most likely Alzheimer's with a vascular component. Patient did complete a MOCA and scored in the dementia range at 12/30. A head CT revealed global atrophy and diffuse periventricular white matter disease. Patient objectively demonstrated a highly repetitive thought process, per social awareness of personal space, intermittent mood lability, and intermittent confabulation, and grandiosity. She could at times be quite argumentative and irritable. At no point, however, did she manifest verbal threats, no physical aggression. She consistently ate her meals, and tended to her ADLs.       Pt had poor insight into her symptoms and was resistant to taking medications, believing they were unnecessary. She eventually did take risperidone and donepezil which had been prescribed during admission, though it seemed she was under the impression she was taking levothyroxine (the one agent she did believe she needed to take). She had been rather irritable the day before she took these agents, and presented as notably calmer the day after taking them. Thus they appeared to be of benefit. Ultimately, though patient manifested cognitive impairment and behavioral disturbance, she did not meet criteria for probate at the conclusion of her hold as she did nothing dangerous on the unit during her admission. She was offered voluntary admission, but declined this. Patient was discharged back to her [facility] with new prescriptions for donepezil and risperidone.       PE: (reviewed) and labs (see medical H&PE)  VITALS:  /86   Pulse 71   Temp 98.2 °F (36.8 °C) (Temporal)   Resp 18   SpO2 96%     Motor / Gait: No PMA/PMR, no involuntary movements, gait non-ataxic, aided by cane     Mental Status Examination:    Appearance:  female, appears stated age, wearing casual clothing, good grooming and hygiene   Behavior/Attitude toward examiner:  Cooperative, but inattentive  Speech:  nml rate, nml volume, excessive/verbose amount, non pressured  Mood:  \"Very good\"  Affect:  Mildly elevated  Thought processes:  Tangential, and perseverative  Thought Content: Denies SI, Denies HI, no delusions voiced during interaction, +confabulation  Perceptions: Denies AVH, not RTIS  Attention: impaired  Abstraction: concrete  Cognition: Oriented to self, month, year, recall impaired  Insight: Impaired insight   Judgment: Impaired judgment      Risk factor analysis:    Patient does have several risk factors for future dangerousness from a psychiatric standpoint including: Dementia with behavioral disturbance, social support lacking, poor insight  Protective factors include: No SI, no HI, no history of self harm or violence, discharge to a [facility] environment with 24 hour supervision available  Stratification: Moderate    Condition on DC  Mood and affect are improved and pt is not suicidal, homicidal, or psychotic.     Follow Up:  See Discharge Instructions     Spent over 40 minutes with patient and staff on Maynor Woodson MD  Staff Psychiatrist

## 2021-04-07 NOTE — GROUP NOTE
Group Therapy Note    Date: 4/7/2021    Group Start Time: 1105  Group End Time: 5019  Group Topic: Cognitive Skills    Pebbles Ayon        Group Therapy Note    Attendees: 2    Patient's Goal: to actively participate in solving a jigsaw puzzle with peers to strengthen cognitive skills, strengthen memory, improve problem solving skills, improve teamwork skills, promote connecting with peers, and to relieve stress. Notes: Verl Ready actively engaged in listening to relaxation music and solving a jigsaw puzzle. Pt required limited field of choices and minor assistance from CTRS to solve the jigsaw puzzle. Pt demonstrated positive teamwork skills. Pt frequently reported she was hungry. Pt was easily redirectable to group intervention. Pt reported enjoyment of music utilized. Pt met group goal.     Status After Intervention:  Improved    Participation Level:  Active Listener and Interactive    Participation Quality: Appropriate, Attentive and Sharing      Speech:  normal      Thought Process/Content: Perseverating      Affective Functioning: Constricted/Restricted      Mood: anxious      Level of consciousness:  Alert      Response to Learning: Capable of insight, Able to change behavior and Progressing to goal      Endings: None Reported    Modes of Intervention: Education, Support, Socialization, Exploration, Clarifying, Problem-solving and Activity      Discipline Responsible: Psychoeducational Specialist      Signature:  COLBY Recinos

## 2021-04-07 NOTE — BH NOTE
[facility]  Discharge Note    Pt discharged with followings belongings:   Clothing: Shirt, Pants, Undergarments (Comment), Socks  Other Valuables: Cell phone   Valuables sent home with patient. Valuables retrieved from safe, Security envelope number:  yes and returned to patient. Patient education on aftercare instructions: patient unable ECF patient  Information faxed to ECF by LT Patient verbalize understanding of AVS:  Attempted to call report, nurse was unable to take. .    Status EXAM upon discharge:  Status and Exam  Normal: No  Facial Expression: Worried  Affect: Congruent  Level of Consciousness: Alert  Mood:Normal: No  Mood: Anxious, Irritable  Motor Activity:Normal: No  Motor Activity: Decreased  Interview Behavior: Evasive, Irritable  Preception: [place] to Person, [place] to Time, [place] to Place  Attention:Normal: No  Attention: Distractible  Thought Processes: Perseveration  Thought Content:Normal: No  Thought Content: Obsessions, Paranoia  Hallucinations: None  Delusions: Yes  Delusions: Grand[place]  Memory:Normal: No  Memory: Poor Recent  Insight and Judgment: No  Insight and Judgment: Poor Judgment, Poor Insight  Present Suicidal Ideation: No  Present Homicidal Ideation: No      Metabolic Screening:    Lab Results   Component Value Date    LABA1C 6.2 02/26/2021       Lab Results   Component Value Date    CHOL 162 01/31/2020    CHOL 187 10/18/2017     Lab Results   Component Value Date    TRIG 204 (H) 01/31/2020    TRIG 177 (H) 10/18/2017     Lab Results   Component Value Date    HDL 40 01/31/2020    HDL 41 10/18/2017     No components found for: [facility] EVALUATION AND TREATMENT [place]  Lab Results   Component Value Date    LABVLDL 41 01/31/2020    LABVLDL 35 10/18/2017     Bridge Appointment completed: Reviewed Discharge Instructions with patient. Patient verbalizes understanding and agreement with the discharge plan using the teachback method.      Referral for Outpatient Tobacco Cessation Counseling, upon discharge (sheridan X if applicable and completed):    ( )  Hospital staff assisted patient to call Quit Line or faxed referral                                   during hospitalization                  ( )  Recognizing danger situations (included triggers and roadblocks), if not completed on admission                    ( )  Coping skills (new ways to manage stress, exercise, relaxation techniques, changing routine, distraction), if not completed on admission                                                           ( )  Basic information about quitting (benefits of quitting, techniques in how to quit, available resources, if not completed on admission  ( ) Referral for counseling faxed to [facility]   ( ) Patient refused referral  ( ) Patient refused counseling  ( ) Patient refused smoking cessation medication upon discharge  Non smoker  Vaccinations (sheridan X if applicable and completed):  ( ) Patient states already received influenza vaccine elsewhere  ( ) Patient received influenza vaccine during this hospitalization  ( ) Patient refused influenza vaccine at this time    Not offered    Mayra Mcdonald RN

## 2021-04-07 NOTE — BH NOTE
DADA called Jaye Koo at [facility], and made her aware of discharge. DADA reviewed pt's medication and discussed f pt's behaviors; however explained the pt is not a safety risk to herself or others at this time. Chiki Patel stated that she will put pt on a one to one when she returns and they will have to evict pt if the behaviors continue. DADA called Caden Boyd whom has filed for guardianship of pt and made her aware that the pt will be discharging today. DADA reviewed pt's medication and discussed f pt's behaviors; however explained the pt is not a safety risk to herself or others at this time. Sw made her aware that Meño Abraham will be putting pt on a one to one when she returns and will evict her if needed. DADA called Apple Godinez at [place] [address] and provided update on discharge.             JENNY Martínez

## 2021-05-15 ENCOUNTER — HOSPITAL ENCOUNTER (EMERGENCY)
Age: 76
Discharge: HOME | End: 2021-05-15
Attending: EMERGENCY MEDICINE
Payer: MEDICARE

## 2021-05-15 ENCOUNTER — APPOINTMENT (OUTPATIENT)
Dept: CT IMAGING | Age: 76
End: 2021-05-15
Payer: MEDICARE

## 2021-05-15 ENCOUNTER — APPOINTMENT (OUTPATIENT)
Dept: GENERAL RADIOLOGY | Age: 76
End: 2021-05-15
Payer: MEDICARE

## 2021-05-15 VITALS
DIASTOLIC BLOOD PRESSURE: 74 MMHG | TEMPERATURE: 98.1 F | HEART RATE: 86 BPM | SYSTOLIC BLOOD PRESSURE: 136 MMHG | BODY MASS INDEX: 28.79 KG/M2 | OXYGEN SATURATION: 100 % | WEIGHT: 178.35 LBS | RESPIRATION RATE: 22 BRPM

## 2021-05-15 DIAGNOSIS — Z86.59 HISTORY OF DEMENTIA: ICD-10-CM
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
## 2021-05-15 PROCEDURE — 73560 X-RAY EXAM OF KNEE 1 OR 2: CPT

## 2021-05-15 PROCEDURE — 72125 CT NECK SPINE W/O DYE: CPT

## 2021-05-15 PROCEDURE — 6370000000 HC RX 637 (ALT 250 FOR IP): Performed by: EMERGENCY MEDICINE

## 2021-05-15 PROCEDURE — 70450 CT HEAD/BRAIN W/O DYE: CPT

## 2021-05-15 PROCEDURE — 99284 EMERGENCY DEPT VISIT MOD MDM: CPT

## 2021-05-15 RX ORDER — ACETAMINOPHEN 325 MG/1
650 TABLET ORAL ONCE
Status: COMPLETED | OUTPATIENT
Start: 2021-05-15 | End: 2021-05-15

## 2021-05-15 RX ADMIN — ACETAMINOPHEN 650 MG: 325 TABLET ORAL at 08:48

## 2021-05-15 ASSESSMENT — PAIN SCALES - GENERAL
PAINLEVEL_OUTOF10: 5
PAINLEVEL_OUTOF10: 5

## 2021-05-15 ASSESSMENT — PAIN DESCRIPTION - LOCATION: LOCATION: LEG

## 2021-05-15 NOTE — ED NOTES
[facility] [address] stating that the patient fell in the middle of the night and when the day shift nurse came on, the patient requested to come to the emergency room. She states that the patient did not have any injury or any mental status changes prior or after the fall.   This was notified to ED MD.      Ciara Fong RN  05/15/21 7609

## 2021-05-15 NOTE — ED TRIAGE NOTES
Pt here via EMS from a locked dementia unit at [address] [place] [address]. States she had a fall this morning and has left leg pain.

## 2021-05-15 NOTE — ED NOTES
Bed: B-34  Expected date: 5/15/21  Expected time: 6:42 AM  Means of arrival:   Comments:  [address] 36, RN  05/15/21 9973

## 2021-05-15 NOTE — ED PROVIDER NOTES
[address] [facility]      Pt Name: Flor Land  MRN: [id number]  [place] 1945  Date of evaluation: 5/15/2021  Provider: Lindsay Porter MD    CHIEF COMPLAINT       Chief Complaint   Patient presents with    Fall     HISTORY OF PRESENT ILLNESS  (Location/Symptom, Timing/Onset,Context/Setting, Quality, Duration, Modifying Factors, Severity). Note limiting factors. Flor Land is a 66 y.o. female who presents to the emergency department from [address] where she resides in a locked dementia unit due to reported concern for a fall. Per EMS they are not sure if she has been altered or not. It is unclear if the fall was witnessed. On arrival here she will open her eyes to voice. She tells the nurse she is here because they are not feeding her at the nursing home. She is unable to provide any significant history. Past medical history noted below, significant for anxiety, dementia, hypothyroid, IBS, pre diabetes. Does not smoke. Aside from what is stated above denies any other symptoms or modifying factors. Nursing Notes reviewed. REVIEW OF SYSTEMS  (2-9 systems for level 4, 10 or more for level 5)   Review of Systems unable to assess secondary to baseline dementia  PAST MEDICAL HISTORY     Past Medical History:   Diagnosis Date    Anxiety     Dementia ([facility] [address]     Hypothyroidism     Irritable bowel syndrome     Prediabetes        SURGICALHISTORY       Past Surgical History:   Procedure Laterality Date    HYSTERECTOMY       CURRENT MEDICATIONS       Previous Medications    DONEPEZIL (ARICEPT) 5 MG TABLET    Take 1 tablet by mouth daily (with breakfast)    LEVOTHYROXINE (SYNTHROID) 50 MCG TABLET    Take 1 tablet by mouth daily    RISPERIDONE (RISPERDAL) 0.5 MG TABLET    Take 1 tablet by mouth 2 times daily      ALLERGIES     Patient has no known allergies.   FAMILY HISTORY       Family History   Problem Relation Age of Onset  Thyroid Disease Sister     No Known Problems Sister     No Known Problems Son     No Known Problems Son     No Known Problems Daughter      SOCIAL HISTORY       Social History     Socioeconomic History    Marital status: Single     Spouse name: None    Number of children: None    Years of education: None    Highest education level: None   Occupational History    None   Tobacco Use    Smoking status: Never Smoker    Smokeless tobacco: Never Used   Substance and Sexual Activity    Alcohol use: No    Drug use: Never    Sexual activity: Not Currently   Other Topics Concern    None   Social History Narrative    None     Social Determinants of Health     Financial Resource Strain:     Difficulty of Paying Living Expenses:    Food Insecurity:     Worried About Running Out of Food in the Last Year:     Ran Out of Food in the Last Year:    Transportation Needs:     Lack of Transportation (Medical):  Lack of Transportation (Non-Medical):    Physical Activity:     Days of Exercise per Week:     Minutes of Exercise per Session:    Stress:     Feeling of Stress :    Social Connections:     Frequency of Communication with Friends and Family:     Frequency of Social Gatherings with Friends and Family:     Attends Worship Services:     Active Member of Clubs or Organizations:     Attends Club or Organization Meetings:     Marital Status:    Intimate Partner Violence:     Fear of Current or Ex-Partner:     Emotionally Abused:     Physically Abused:     Sexually Abused:      SCREENINGS    Josie Coma Scale  Eye Opening: Spontaneous  Best Verbal Response: Confused  Best Motor Response: Obeys commands  Josie Coma Scale Score: 14    PHYSICAL EXAM  (up to 7 for level 4, 8 or more for level 5)   INITIAL VITALS: BP: 120/66, Temp: 98.2 °F (36.8 °C), Pulse: 96, Resp: 19, SpO2: 100 %   Physical Exam  Vitals reviewed. Constitutional:       Appearance: She is not toxic-appearing.    HENT: Head: Normocephalic and atraumatic. Right Ear: External ear normal.      Left Ear: External ear normal.      Nose: Nose normal.   Eyes:      General:         Right eye: No discharge. Left eye: No discharge. Extraocular Movements: Extraocular movements intact. Conjunctiva/sclera: Conjunctivae normal.      Pupils: Pupils are equal, round, and reactive to light. Neck:      Trachea: No tracheal deviation. Cardiovascular:      Rate and Rhythm: Normal rate. Pulses: Normal pulses. Pulmonary:      Effort: Pulmonary effort is normal. No respiratory distress. Abdominal:      General: There is no distension. Tenderness: There is no abdominal tenderness. There is no right CVA tenderness, left CVA tenderness or guarding. Musculoskeletal:         General: No tenderness or deformity. Cervical back: No rigidity. Right lower leg: No edema. Left lower leg: No edema. Skin:     General: Skin is warm and dry. Capillary Refill: Capillary refill takes less than 2 seconds. Neurological:      General: No focal deficit present. Psychiatric:         Attention and Perception: She is inattentive. Speech: Speech is tangential (minimally conversant). DIAGNOSTIC RESULTS     RADIOLOGY:   Interpretation per Radiologist below, if available at the time of this note:  XR KNEE RIGHT (1-2 VIEWS)   Final Result   No acute finding of the right knee. CT HEAD WO CONTRAST   Final Result   No acute intracranial abnormality. CT CERVICAL SPINE WO CONTRAST   Final Result   No acute abnormality of the cervical spine. LABS:  Labs Reviewed - No data to display    All other labs were within normal range or not returned as of this dictation.     EMERGENCY DEPARTMENT COURSE and DIFFERENTIAL DIAGNOSIS/MDM:   Patient was given the following medications:  Orders Placed This Encounter   Medications    acetaminophen (TYLENOL) tablet 650 mg CONSULTS:  None    INITIAL VITALS: BP: 120/66, Temp: 98.2 °F (36.8 °C), Pulse: 96, Resp: 19, SpO2: 100 %   RECENT VITALS:  BP: 120/66,Temp: 98.2 °F (36.8 °C), Pulse: 96, Resp: 19, SpO2: 100 %     Lala Tellez is a 66 y.o. female who presents to the emergency department from her locked dementia unit. As patient has a history of dementia and is unable to provide significant history we did call the nursing home for further collateral.  Nursing home reports patient fell overnight, it was a witness, no known injury. This morning she requested to come to the emergency department. They deny any altered mental status. Our morning nurse is also familiar with the patient, she reports previously when she was here patient had similar complaint of not being fed enough. It is noted from her past visit she has gained 20 pounds. She does not appear edematous. On arrival her vitals are hemodynamically stable and within normal limits. On exam her primary exam is intact. Secondary exam she has no obvious bruising or signs of trauma. Negative log roll bilaterally. No abdominal tenderness palpation. Lungs are clear. She can follow very simple commands but not two-step commands. She will answer some questions though does not answer the question asked. CT head and cervical spine are ordered. She was ordered Tylenol. Imaging showed no acute abnormalities. As she does walk at baseline (with a walker) after imaging returned negative we attempted to ambulate the patient. At that time she reported she had some pain in her right knee. On re-assessment of extremities there continues to be no findings of trauma, no swelling, no skin changes, able to range without issues. X-ray imaging was ordered, this was negative. After this she was amenable to attempting to ambulate. She does use a walker at baseline and here had assistanace. There were no new findings for discomfort, pain, gait abnormalities.   She will be transferred back to the nursing home in stable condition and the nursing facility was updated as well. FINAL IMPRESSION      1. Fall, initial encounter    2.  History of dementia        DISPOSITION/PLAN   DISPOSITION Decision To Discharge 05/15/2021 09:18:08 AM      PATIENT REFERRED TO:  Risa Schaeffer MD  [address]  [phone]    Schedule an appointment as soon as possible for a visit   For follow up appointment      DISCHARGE MEDICATIONS:  New Prescriptions    No medications on file            (Please note that portions of this note were completed with a voice recognition program. Efforts were made to edit the dictations but occasionally words are mis-transcribed.)    Imani Zhao MD (electronically signed)  Attending Emergency Physician      Imani Zhao MD  05/15/21 0321

## 2021-12-27 ENCOUNTER — APPOINTMENT (OUTPATIENT)
Dept: GENERAL RADIOLOGY | Age: 76
DRG: 640 | End: 2021-12-27
Payer: MEDICARE

## 2021-12-27 ENCOUNTER — HOSPITAL ENCOUNTER (INPATIENT)
Age: 76
LOS: 2 days | Discharge: HOSPICE | DRG: 640 | End: 2021-12-29
Attending: EMERGENCY MEDICINE | Admitting: INTERNAL MEDICINE
Payer: MEDICARE

## 2021-12-27 ENCOUNTER — APPOINTMENT (OUTPATIENT)
Dept: CT IMAGING | Age: 76
DRG: 640 | End: 2021-12-27
Payer: MEDICARE

## 2021-12-27 DIAGNOSIS — R65.20 SEPSIS WITH ACUTE ORGAN DYSFUNCTION WITHOUT SEPTIC SHOCK, DUE TO UNSPECIFIED ORGANISM, UNSPECIFIED TYPE (HCC): Primary | ICD-10-CM

## 2021-12-27 DIAGNOSIS — A41.9 SEPSIS WITH ACUTE ORGAN DYSFUNCTION WITHOUT SEPTIC SHOCK, DUE TO UNSPECIFIED ORGANISM, UNSPECIFIED TYPE (HCC): Primary | ICD-10-CM

## 2021-12-27 DIAGNOSIS — E87.0 HYPERNATREMIA: ICD-10-CM

## 2021-12-27 LAB
A/G RATIO: 0.8 (ref 1.1–2.2)
ALBUMIN SERPL-MCNC: 3 G/DL (ref 3.4–5)
ALP BLD-CCNC: 55 U/L (ref 40–129)
ALT SERPL-CCNC: 13 U/L (ref 10–40)
AMORPHOUS: ABNORMAL /HPF
ANION GAP SERPL CALCULATED.3IONS-SCNC: 15 MMOL/L (ref 3–16)
ANION GAP SERPL CALCULATED.3IONS-SCNC: 15 MMOL/L (ref 3–16)
AST SERPL-CCNC: 29 U/L (ref 15–37)
BACTERIA: ABNORMAL /HPF
BASE EXCESS VENOUS: -5 MMOL/L (ref -2–3)
BASOPHILS ABSOLUTE: 0 K/UL (ref 0–0.2)
BASOPHILS RELATIVE PERCENT: 0.2 %
BILIRUB SERPL-MCNC: 0.9 MG/DL (ref 0–1)
BILIRUBIN URINE: ABNORMAL
BLOOD, URINE: NEGATIVE
BUN BLDV-MCNC: 83 MG/DL (ref 7–20)
BUN BLDV-MCNC: 87 MG/DL (ref 7–20)
CALCIUM SERPL-MCNC: 7.8 MG/DL (ref 8.3–10.6)
CALCIUM SERPL-MCNC: 8.1 MG/DL (ref 8.3–10.6)
CARBOXYHEMOGLOBIN: 1.1 % (ref 0–1.5)
CHLORIDE BLD-SCNC: 133 MMOL/L (ref 99–110)
CHLORIDE BLD-SCNC: 137 MMOL/L (ref 99–110)
CLARITY: CLEAR
CO2: 16 MMOL/L (ref 21–32)
CO2: 19 MMOL/L (ref 21–32)
COLOR: YELLOW
CREAT SERPL-MCNC: 1.2 MG/DL (ref 0.6–1.2)
CREAT SERPL-MCNC: 1.2 MG/DL (ref 0.6–1.2)
EKG ATRIAL RATE: 106 BPM
EKG DIAGNOSIS: NORMAL
EKG P AXIS: 37 DEGREES
EKG P-R INTERVAL: 140 MS
EKG Q-T INTERVAL: 344 MS
EKG QRS DURATION: 78 MS
EKG QTC CALCULATION (BAZETT): 456 MS
EKG R AXIS: 9 DEGREES
EKG T AXIS: 85 DEGREES
EKG VENTRICULAR RATE: 106 BPM
EOSINOPHILS ABSOLUTE: 0 K/UL (ref 0–0.6)
EOSINOPHILS RELATIVE PERCENT: 0 %
EPITHELIAL CELLS, UA: ABNORMAL /HPF (ref 0–5)
GFR AFRICAN AMERICAN: 53
GFR AFRICAN AMERICAN: 53
GFR NON-AFRICAN AMERICAN: 43
GFR NON-AFRICAN AMERICAN: 43
GLUCOSE BLD-MCNC: 171 MG/DL (ref 70–99)
GLUCOSE BLD-MCNC: 210 MG/DL (ref 70–99)
GLUCOSE URINE: NEGATIVE MG/DL
HCO3 VENOUS: 18.1 MMOL/L (ref 24–28)
HCT VFR BLD CALC: 42.6 % (ref 36–48)
HEMOGLOBIN: 13.2 G/DL (ref 12–16)
HYALINE CASTS: ABNORMAL /LPF (ref 0–2)
KETONES, URINE: NEGATIVE MG/DL
LACTIC ACID, SEPSIS: 4 MMOL/L (ref 0.4–1.9)
LACTIC ACID, SEPSIS: 4.1 MMOL/L (ref 0.4–1.9)
LACTIC ACID: 3.8 MMOL/L (ref 0.4–2)
LEUKOCYTE ESTERASE, URINE: NEGATIVE
LYMPHOCYTES ABSOLUTE: 1.7 K/UL (ref 1–5.1)
LYMPHOCYTES RELATIVE PERCENT: 18.4 %
MCH RBC QN AUTO: 32 PG (ref 26–34)
MCHC RBC AUTO-ENTMCNC: 31 G/DL (ref 31–36)
MCV RBC AUTO: 103.2 FL (ref 80–100)
METHEMOGLOBIN VENOUS: 0.3 % (ref 0–1.5)
MICROSCOPIC EXAMINATION: YES
MONOCYTES ABSOLUTE: 0.9 K/UL (ref 0–1.3)
MONOCYTES RELATIVE PERCENT: 10 %
MUCUS: ABNORMAL /LPF
NEUTROPHILS ABSOLUTE: 6.5 K/UL (ref 1.7–7.7)
NEUTROPHILS RELATIVE PERCENT: 71.4 %
NITRITE, URINE: NEGATIVE
O2 SAT, VEN: 100 %
PCO2, VEN: 28 MMHG (ref 41–51)
PDW BLD-RTO: 15.6 % (ref 12.4–15.4)
PH UA: 5.5 (ref 5–8)
PH VENOUS: 7.42 (ref 7.35–7.45)
PLATELET # BLD: 117 K/UL (ref 135–450)
PMV BLD AUTO: 10.4 FL (ref 5–10.5)
PO2, VEN: 179 MMHG (ref 25–40)
POTASSIUM REFLEX MAGNESIUM: 4.8 MMOL/L (ref 3.5–5.1)
POTASSIUM REFLEX MAGNESIUM: 5.6 MMOL/L (ref 3.5–5.1)
PROCALCITONIN: 0.24 NG/ML (ref 0–0.15)
PROTEIN UA: 100 MG/DL
RBC # BLD: 4.13 M/UL (ref 4–5.2)
RBC UA: ABNORMAL /HPF (ref 0–4)
SODIUM BLD-SCNC: 167 MMOL/L (ref 136–145)
SODIUM BLD-SCNC: 168 MMOL/L (ref 136–145)
SPECIFIC GRAVITY UA: >=1.03 (ref 1–1.03)
T4 FREE: 1 NG/DL (ref 0.9–1.8)
TCO2 CALC VENOUS: 19 MMOL/L
TOTAL PROTEIN: 6.9 G/DL (ref 6.4–8.2)
TROPONIN: 0.08 NG/ML
TROPONIN: 0.11 NG/ML
TSH SERPL DL<=0.05 MIU/L-ACNC: 0.43 UIU/ML (ref 0.27–4.2)
URINE REFLEX TO CULTURE: ABNORMAL
URINE TYPE: ABNORMAL
UROBILINOGEN, URINE: 0.2 E.U./DL
WBC # BLD: 9.1 K/UL (ref 4–11)
WBC UA: ABNORMAL /HPF (ref 0–5)

## 2021-12-27 PROCEDURE — 81001 URINALYSIS AUTO W/SCOPE: CPT

## 2021-12-27 PROCEDURE — 84145 PROCALCITONIN (PCT): CPT

## 2021-12-27 PROCEDURE — 2580000003 HC RX 258: Performed by: INTERNAL MEDICINE

## 2021-12-27 PROCEDURE — 93005 ELECTROCARDIOGRAM TRACING: CPT | Performed by: EMERGENCY MEDICINE

## 2021-12-27 PROCEDURE — 2580000003 HC RX 258: Performed by: EMERGENCY MEDICINE

## 2021-12-27 PROCEDURE — 85025 COMPLETE CBC W/AUTO DIFF WBC: CPT

## 2021-12-27 PROCEDURE — 96365 THER/PROPH/DIAG IV INF INIT: CPT

## 2021-12-27 PROCEDURE — 80053 COMPREHEN METABOLIC PANEL: CPT

## 2021-12-27 PROCEDURE — 84443 ASSAY THYROID STIM HORMONE: CPT

## 2021-12-27 PROCEDURE — 96367 TX/PROPH/DG ADDL SEQ IV INF: CPT

## 2021-12-27 PROCEDURE — 87040 BLOOD CULTURE FOR BACTERIA: CPT

## 2021-12-27 PROCEDURE — 99283 EMERGENCY DEPT VISIT LOW MDM: CPT

## 2021-12-27 PROCEDURE — 83605 ASSAY OF LACTIC ACID: CPT

## 2021-12-27 PROCEDURE — 87150 DNA/RNA AMPLIFIED PROBE: CPT

## 2021-12-27 PROCEDURE — 84439 ASSAY OF FREE THYROXINE: CPT

## 2021-12-27 PROCEDURE — 70450 CT HEAD/BRAIN W/O DYE: CPT

## 2021-12-27 PROCEDURE — 82803 BLOOD GASES ANY COMBINATION: CPT

## 2021-12-27 PROCEDURE — 36415 COLL VENOUS BLD VENIPUNCTURE: CPT

## 2021-12-27 PROCEDURE — 6360000002 HC RX W HCPCS: Performed by: EMERGENCY MEDICINE

## 2021-12-27 PROCEDURE — 84484 ASSAY OF TROPONIN QUANT: CPT

## 2021-12-27 PROCEDURE — 71045 X-RAY EXAM CHEST 1 VIEW: CPT

## 2021-12-27 PROCEDURE — 2060000000 HC ICU INTERMEDIATE R&B

## 2021-12-27 PROCEDURE — 96361 HYDRATE IV INFUSION ADD-ON: CPT

## 2021-12-27 RX ORDER — LORAZEPAM 0.5 MG/1
0.5 TABLET ORAL 2 TIMES DAILY
COMMUNITY

## 2021-12-27 RX ORDER — SODIUM CHLORIDE, SODIUM LACTATE, POTASSIUM CHLORIDE, AND CALCIUM CHLORIDE .6; .31; .03; .02 G/100ML; G/100ML; G/100ML; G/100ML
30 INJECTION, SOLUTION INTRAVENOUS ONCE
Status: COMPLETED | OUTPATIENT
Start: 2021-12-27 | End: 2021-12-27

## 2021-12-27 RX ORDER — OLANZAPINE 5 MG/1
5 TABLET ORAL 2 TIMES DAILY
COMMUNITY

## 2021-12-27 RX ORDER — LEVOTHYROXINE SODIUM 0.05 MG/1
50 TABLET ORAL
Status: DISCONTINUED | OUTPATIENT
Start: 2021-12-28 | End: 2021-12-29 | Stop reason: HOSPADM

## 2021-12-27 RX ORDER — POTASSIUM CHLORIDE 20 MEQ/1
20 TABLET, EXTENDED RELEASE ORAL EVERY OTHER DAY
COMMUNITY

## 2021-12-27 RX ORDER — INSULIN LISPRO 100 [IU]/ML
0-3 INJECTION, SOLUTION INTRAVENOUS; SUBCUTANEOUS NIGHTLY
Status: DISCONTINUED | OUTPATIENT
Start: 2021-12-27 | End: 2021-12-29 | Stop reason: HOSPADM

## 2021-12-27 RX ORDER — ACETAMINOPHEN 325 MG/1
650 TABLET ORAL EVERY 6 HOURS PRN
Status: DISCONTINUED | OUTPATIENT
Start: 2021-12-27 | End: 2021-12-29 | Stop reason: HOSPADM

## 2021-12-27 RX ORDER — ACETAMINOPHEN 325 MG/1
650 TABLET ORAL EVERY 6 HOURS PRN
COMMUNITY

## 2021-12-27 RX ORDER — SODIUM CHLORIDE 9 MG/ML
25 INJECTION, SOLUTION INTRAVENOUS PRN
Status: DISCONTINUED | OUTPATIENT
Start: 2021-12-27 | End: 2021-12-29 | Stop reason: HOSPADM

## 2021-12-27 RX ORDER — INSULIN LISPRO 100 [IU]/ML
0-6 INJECTION, SOLUTION INTRAVENOUS; SUBCUTANEOUS
Status: DISCONTINUED | OUTPATIENT
Start: 2021-12-28 | End: 2021-12-29 | Stop reason: HOSPADM

## 2021-12-27 RX ORDER — QUETIAPINE FUMARATE 50 MG/1
50 TABLET, FILM COATED ORAL DAILY
COMMUNITY

## 2021-12-27 RX ORDER — ONDANSETRON 2 MG/ML
4 INJECTION INTRAMUSCULAR; INTRAVENOUS EVERY 6 HOURS PRN
Status: DISCONTINUED | OUTPATIENT
Start: 2021-12-27 | End: 2021-12-29 | Stop reason: HOSPADM

## 2021-12-27 RX ORDER — DIVALPROEX SODIUM 250 MG/1
250 TABLET, DELAYED RELEASE ORAL 2 TIMES DAILY
COMMUNITY

## 2021-12-27 RX ORDER — DEXTROSE MONOHYDRATE 50 MG/ML
INJECTION, SOLUTION INTRAVENOUS CONTINUOUS
Status: DISCONTINUED | OUTPATIENT
Start: 2021-12-27 | End: 2021-12-29 | Stop reason: HOSPADM

## 2021-12-27 RX ORDER — MAGNESIUM HYDROXIDE/ALUMINUM HYDROXICE/SIMETHICONE 120; 1200; 1200 MG/30ML; MG/30ML; MG/30ML
30 SUSPENSION ORAL EVERY 4 HOURS PRN
COMMUNITY

## 2021-12-27 RX ORDER — NICOTINE POLACRILEX 4 MG
15 LOZENGE BUCCAL PRN
Status: DISCONTINUED | OUTPATIENT
Start: 2021-12-27 | End: 2021-12-29 | Stop reason: HOSPADM

## 2021-12-27 RX ORDER — ACETAMINOPHEN 650 MG/1
650 SUPPOSITORY RECTAL EVERY 6 HOURS PRN
Status: DISCONTINUED | OUTPATIENT
Start: 2021-12-27 | End: 2021-12-29 | Stop reason: HOSPADM

## 2021-12-27 RX ORDER — QUETIAPINE FUMARATE 200 MG/1
200 TABLET, FILM COATED ORAL NIGHTLY
COMMUNITY

## 2021-12-27 RX ORDER — SODIUM CHLORIDE 0.9 % (FLUSH) 0.9 %
5-40 SYRINGE (ML) INJECTION EVERY 12 HOURS SCHEDULED
Status: DISCONTINUED | OUTPATIENT
Start: 2021-12-27 | End: 2021-12-29 | Stop reason: HOSPADM

## 2021-12-27 RX ORDER — ONDANSETRON 4 MG/1
4 TABLET, ORALLY DISINTEGRATING ORAL EVERY 8 HOURS PRN
Status: DISCONTINUED | OUTPATIENT
Start: 2021-12-27 | End: 2021-12-29 | Stop reason: HOSPADM

## 2021-12-27 RX ORDER — SODIUM CHLORIDE 0.9 % (FLUSH) 0.9 %
5-40 SYRINGE (ML) INJECTION PRN
Status: DISCONTINUED | OUTPATIENT
Start: 2021-12-27 | End: 2021-12-29 | Stop reason: HOSPADM

## 2021-12-27 RX ORDER — AMMONIUM LACTATE 12 G/100G
LOTION TOPICAL DAILY PRN
COMMUNITY

## 2021-12-27 RX ORDER — POLYETHYLENE GLYCOL 3350 17 G/17G
17 POWDER, FOR SOLUTION ORAL DAILY PRN
Status: DISCONTINUED | OUTPATIENT
Start: 2021-12-27 | End: 2021-12-29 | Stop reason: HOSPADM

## 2021-12-27 RX ORDER — DEXTROSE MONOHYDRATE 25 G/50ML
12.5 INJECTION, SOLUTION INTRAVENOUS PRN
Status: DISCONTINUED | OUTPATIENT
Start: 2021-12-27 | End: 2021-12-29 | Stop reason: HOSPADM

## 2021-12-27 RX ORDER — DEXTROSE MONOHYDRATE 50 MG/ML
100 INJECTION, SOLUTION INTRAVENOUS PRN
Status: DISCONTINUED | OUTPATIENT
Start: 2021-12-27 | End: 2021-12-29 | Stop reason: HOSPADM

## 2021-12-27 RX ADMIN — VANCOMYCIN HYDROCHLORIDE 1250 MG: 10 INJECTION, POWDER, LYOPHILIZED, FOR SOLUTION INTRAVENOUS at 17:28

## 2021-12-27 RX ADMIN — CEFEPIME HYDROCHLORIDE 2000 MG: 2 INJECTION, POWDER, FOR SOLUTION INTRAVENOUS at 16:38

## 2021-12-27 RX ADMIN — SODIUM CHLORIDE, SODIUM LACTATE, POTASSIUM CHLORIDE, AND CALCIUM CHLORIDE 1437 ML: .6; .31; .03; .02 INJECTION, SOLUTION INTRAVENOUS at 14:30

## 2021-12-27 RX ADMIN — CALCIUM GLUCONATE 2000 MG: 98 INJECTION, SOLUTION INTRAVENOUS at 18:21

## 2021-12-27 RX ADMIN — DEXTROSE MONOHYDRATE: 5 INJECTION, SOLUTION INTRAVENOUS at 20:46

## 2021-12-27 ASSESSMENT — PAIN SCALES - PAIN ASSESSMENT IN ADVANCED DEMENTIA (PAINAD)
CONSOLABILITY: 0
TOTALSCORE: 1
BREATHING: 0
BODYLANGUAGE: 0
NEGVOCALIZATION: 1
FACIALEXPRESSION: 0

## 2021-12-27 ASSESSMENT — PAIN SCALES - GENERAL: PAINLEVEL_OUTOF10: 0

## 2021-12-27 NOTE — ED NOTES
Bed: 1TR-01  Expected date:   Expected time:   Means of arrival:   Comments:  1530 PATRICK Marie  12/27/21 1400

## 2021-12-27 NOTE — ED PROVIDER NOTES
[facility]          ATTENDING PHYSICIAN NOTE       Date of evaluation: 12/27/2021    Chief Complaint     Altered Mental Status (from SNF that had recent [address]. Unknown last known normal. )      History of Present Illness     Shayy Pastor is a 64 y.o. female with a history of severe dementia, Diabetes mellitus type 2, and hypertension who presents from [facility] unresponsive. The agency nurse that began taking care of her today has never seen her before and does not know her baseline but found her today to be lethargic and unresponsive. She notes that she just got out of their Covid unit. The patient's baseline is not known to that nurse and no one else is aware of her baseline at this time. The patient herself cannot give any history. Prehospital blood sugar was in the 200s and oxygen saturation was low but is now at 96% on 4 L. It is not known whether she wears oxygen at baseline. She is DNR CC but nursing home could not print off paperwork. Paperwork here shows that she has a court appointed guardian. Last known normal unknown. The patient was admitted to [facility] from 12 9-12 16 for hypoxic respiratory failure secondary to Covid pneumonia. Baseline mental status from [place] notes indicates that she probably is awake at baseline and able to communicate to some degree. Court appointed guardian during her last admission made her DNR CC. Review of Systems     Review of Systems   Unable to perform ROS: Patient unresponsive       Past Medical, Surgical, Family, and Social History     She has a past medical history of Anxiety, Dementia ([address] Hypothyroidism, Irritable bowel syndrome, and Prediabetes. She has a past surgical history that includes Hysterectomy. Her family history includes No Known Problems in her daughter, sister, son, and son; Thyroid Disease in her sister. She reports that she has never smoked.  She has never used smokeless tobacco. She reports that she does not drink alcohol and does not use drugs. Medications     Previous Medications    DONEPEZIL (ARICEPT) 5 MG TABLET    Take 1 tablet by mouth daily (with breakfast)    LEVOTHYROXINE (SYNTHROID) 50 MCG TABLET    Take 1 tablet by mouth daily    RISPERIDONE (RISPERDAL) 0.5 MG TABLET    Take 1 tablet by mouth 2 times daily       Allergies     She has No Known Allergies. Physical Exam     INITIAL VITALS: BP: 81/68, Temp: 98 °F (36.7 °C), Pulse: 120, Resp: 21, SpO2: 95 %   Physical Exam  Vitals and nursing note reviewed. Constitutional:       General: She is not in acute distress. Appearance: She is cachectic. She is not diaphoretic. HENT:      Head: Normocephalic. Eyes:      Pupils: Pupils are equal, round, and reactive to light. Cardiovascular:      Rate and Rhythm: Regular rhythm. Tachycardia present. Pulmonary:      Effort: Pulmonary effort is normal.      Breath sounds: Normal breath sounds. Abdominal:      General: Bowel sounds are normal. There is no distension. Palpations: Abdomen is soft. Tenderness: There is no abdominal tenderness. Skin:     General: Skin is warm and dry. Neurological:      Mental Status: She is lethargic. Diagnostic Results     EKG   EKG Interpretation    Interpreted by emergency department physician    Rhythm: sinus tachycardia  Rate:  116  Axis: normal  Ectopy: none  Conduction: normal  ST Segments: nonspecific changes  T Waves: non specific changes  Q Waves: none    Clinical Impression: sinus tachycardia    Eleuterio Fine MD      RADIOLOGY:  CT HEAD WO CONTRAST   Final Result      No acute intracranial abnormality. Age-related atrophy and sequelae of chronic microvascular ischemic change. XR CHEST PORTABLE    (Results Pending)       LABS:   No results found for this visit on 12/27/21.      RECENT VITALS:  BP: (!) 74/61,Temp: 98 °F (36.7 °C), Pulse: 114, Resp: (!) 36, SpO2: 93 %        ED Course     Nursing Notes, Past Medical Hx, Past Surgical Hx, Social Hx,Allergies, and Family Hx were reviewed. patient was given the following medications:  Orders Placed This Encounter   Medications    lactated ringers bolus       CONSULTS:  [facility] DECISIONMAKING / ASSESSMENT / Miriam Sindhu is a 64 y.o. female resenting unresponsive from her nursing home after recent Covid infection. According to notes from [facility], the patient is DNR CC and is under the care of a legal guardian appointed by the court. Evaluation is pending at this time and palliative care consult was ordered and care will be turned over to the oncoming provider to follow-up results and determine disposition.        Reji Calderon MD  12/31/21 0407

## 2021-12-27 NOTE — ED PROVIDER NOTES
[address] [facility] ENCOUNTER          ATTENDING PHYSICIAN NOTE       Date of evaluation: 12/27/2021    ADDENDUM:      Care of this patient was assumed from Dr. Kia Baltazar. The patient was seen for Altered Mental Status (from SNF that had recent CoVid. Unknown last known normal. )  . The patient's initial evaluation and plan have been discussed with the prior provider who initially evaluated the patient. Please see the original HPI and MDM for full details. Nursing Notes, Past Medical Hx, Past Surgical Hx, Social Hx, Allergies, and Family Hx were all reviewed. Diagnostic Results       RADIOLOGY:  XR CHEST PORTABLE   Final Result   Impression: Elevation of the right hemidiaphragm, new from prior. No acute pulmonary abnormality. CT HEAD WO CONTRAST   Final Result      No acute intracranial abnormality. Age-related atrophy and sequelae of chronic microvascular ischemic change.           LABS:   Results for orders placed or performed during the hospital encounter of 12/27/21   CBC Auto Differential   Result Value Ref Range    WBC 9.1 4.0 - 11.0 K/uL    RBC 4.13 4.00 - 5.20 M/uL    Hemoglobin 13.2 12.0 - 16.0 g/dL    Hematocrit 42.6 36.0 - 48.0 %    .2 (H) 80.0 - 100.0 fL    MCH 32.0 26.0 - 34.0 pg    MCHC 31.0 31.0 - 36.0 g/dL    RDW 15.6 (H) 12.4 - 15.4 %    Platelets 908 (L) 776 - 450 K/uL    MPV 10.4 5.0 - 10.5 fL    Neutrophils % 71.4 %    Lymphocytes % 18.4 %    Monocytes % 10.0 %    Eosinophils % 0.0 %    Basophils % 0.2 %    Neutrophils Absolute 6.5 1.7 - 7.7 K/uL    Lymphocytes Absolute 1.7 1.0 - 5.1 K/uL    Monocytes Absolute 0.9 0.0 - 1.3 K/uL    Eosinophils Absolute 0.0 0.0 - 0.6 K/uL    Basophils Absolute 0.0 0.0 - 0.2 K/uL   Comprehensive Metabolic Panel w/ Reflex to MG   Result Value Ref Range    Sodium 167 (HH) 136 - 145 mmol/L    Potassium reflex Magnesium 5.6 (H) 3.5 - 5.1 mmol/L    Chloride 133 (H) 99 - 110 mmol/L    CO2 19 (L) 21 - 32 mmol/L    Anion Gap 15 3 - 16    Glucose 210 (H) 70 - 99 mg/dL    BUN 83 (HH) 7 - 20 mg/dL    CREATININE 1.2 0.6 - 1.2 mg/dL    GFR Non- 43 (A) >60    GFR  53 (A) >60    Calcium 8.1 (L) 8.3 - 10.6 mg/dL    Total Protein 6.9 6.4 - 8.2 g/dL    Albumin 3.0 (L) 3.4 - 5.0 g/dL    Albumin/Globulin Ratio 0.8 (L) 1.1 - 2.2    Total Bilirubin 0.9 0.0 - 1.0 mg/dL    Alkaline Phosphatase 55 40 - 129 U/L    ALT 13 10 - 40 U/L    AST 29 15 - 37 U/L   Troponin   Result Value Ref Range    Troponin 0.11 (H) <0.01 ng/mL   Lactate, Sepsis   Result Value Ref Range    Lactic Acid, Sepsis 4.0 (HH) 0.4 - 1.9 mmol/L   Lactate, Sepsis   Result Value Ref Range    Lactic Acid, Sepsis 4.1 (HH) 0.4 - 1.9 mmol/L   Urinalysis Reflex to Culture    Specimen: Urine, clean catch   Result Value Ref Range    Urine Type NotGiven    Blood Gas, Venous   Result Value Ref Range    pH, Tommy 7.419 7.350 - 7.450    pCO2, Tommy 28.0 (L) 41.0 - 51.0 mmHg    pO2, Tommy 179.0 (H) 25.0 - 40.0 mmHg    HCO3, Venous 18.1 (L) 24.0 - 28.0 mmol/L    Base Excess, Tommy -5.0 (L) -2.0 - 3.0 mmol/L    O2 Sat, Tommy 100 Not established %    Carboxyhemoglobin 1.1 0.0 - 1.5 %    MetHgb, Tommy 0.3 0.0 - 1.5 %    TC02 (Calc), Tommy 19 mmol/L   Basic Metabolic Panel w/ Reflex to MG   Result Value Ref Range    Sodium 168 (HH) 136 - 145 mmol/L    Potassium reflex Magnesium 4.8 3.5 - 5.1 mmol/L    Chloride 137 (H) 99 - 110 mmol/L    CO2 16 (L) 21 - 32 mmol/L    Anion Gap 15 3 - 16    Glucose 171 (H) 70 - 99 mg/dL    BUN 87 (HH) 7 - 20 mg/dL    CREATININE 1.2 0.6 - 1.2 mg/dL    GFR Non- 43 (A) >60    GFR  53 (A) >60    Calcium 7.8 (L) 8.3 - 10.6 mg/dL   Troponin   Result Value Ref Range    Troponin 0.08 (H) <0.01 ng/mL   EKG 12 Lead   Result Value Ref Range    Ventricular Rate 106 BPM    Atrial Rate 106 BPM    P-R Interval 140 ms    QRS Duration 78 ms    Q-T Interval 344 ms    QTc Calculation (Bazett) 456 ms    P Axis 37 degrees    R Axis 9 degrees    T Axis 85 degrees    Diagnosis       EKG performed in ER and to be interpreted by ER physician. Confirmed by MD, ER ([id number]),  [address] [place] [address] S,  [address] ([id number]) on 12/27/2021 4:50:33 PM       RECENT VITALS:  BP: 103/76, Temp: 98 °F (36.7 °C), Pulse: 91, Resp: 19, SpO2: 98 %     Procedures   Procedures    ED Course     The patient was given the following medications:  Orders Placed This Encounter   Medications    lactated ringers bolus    cefepime (MAXIPIME) 2000 mg IVPB minibag     Order Specific Question:   Antimicrobial Indications     Answer:   Sepsis of Unknown Etiology    vancomycin (VANCOCIN) 1,250 mg in dextrose 5 % 250 mL IVPB     Order Specific Question:   Antimicrobial Indications     Answer:   Sepsis of Unknown Etiology    calcium gluconate 2,000 mg in dextrose 5 % 100 mL IVPB       CONSULTS:  IP CONSULT TO PALLIATIVE CARE  IP CONSULT TO HOSPITALIST    MEDICAL DECISION MAKING / ASSESSMENT / Robycain Garcia is a 64 y.o. female who initially presented for altered mental status. Please see the original HPI and MDM for full details. At time of signout, patient was pending ongoing laboratory work-up, resuscitation with IV fluids, and attempts at discussion of the patient's care with her legal guardian. Patient was noted to have severe hypernatremia and uremia with FLOR. She has an elevated lactate. This may all be due to severe dehydration, but is also concerning for severe sepsis her blood pressure improved after 30 mL/KG IV fluid bolus that was ordered by the previous provider. Her mental status was also improved and she was slightly more alert and interactive, but still quite confused. Unfortunately multiple attempts to reach her guardian to discuss details of care have been unsuccessful. She is listed as DNR-CC. We will not perform any heroic measures to resuscitate her, however we have obtain blood cultures, initiated broad-spectrum IV antibiotics.   Would not insert a central line or perform other invasive procedures based on her CODE STATUS, and at this time her maps are borderline but acceptable at greater than 65. Will discuss with the hospitalist for further care management. Critical Care:  Due to the immediate potential for life-threatening deterioration due to severe electrolyte disturbances, severe sepsis, I spent 40 minutes providing critical care. This time excludes time spent performing procedures but includes time spent on direct patient care, history retrieval, review of the chart, and discussions with patient, family, and consultant(s). Clinical Impression     1. Sepsis with acute organ dysfunction without septic shock, due to unspecified organism, unspecified type ([facility] [address]    2. Hypernatremia        Disposition     PATIENT REFERRED TO:  No follow-up provider specified.     DISCHARGE MEDICATIONS:  New Prescriptions    No medications on file       DISPOSITION Decision To Admit 12/27/2021 07:22:22 PM       Xiomara Bay MD  12/27/21 6012

## 2021-12-28 ENCOUNTER — APPOINTMENT (OUTPATIENT)
Dept: GENERAL RADIOLOGY | Age: 76
DRG: 640 | End: 2021-12-28
Payer: MEDICARE

## 2021-12-28 PROBLEM — N17.9 AKI (ACUTE KIDNEY INJURY) (HCC): Status: ACTIVE | Noted: 2021-12-28

## 2021-12-28 PROBLEM — R62.7 FAILURE TO THRIVE IN ADULT: Status: ACTIVE | Noted: 2021-12-28

## 2021-12-28 PROBLEM — E87.0 HYPERNATREMIA: Status: ACTIVE | Noted: 2021-12-28

## 2021-12-28 LAB
ALBUMIN SERPL-MCNC: 2.6 G/DL (ref 3.4–5)
ANION GAP SERPL CALCULATED.3IONS-SCNC: 10 MMOL/L (ref 3–16)
BASOPHILS ABSOLUTE: 0 K/UL (ref 0–0.2)
BASOPHILS RELATIVE PERCENT: 0.2 %
BUN BLDV-MCNC: 74 MG/DL (ref 7–20)
CALCIUM SERPL-MCNC: 8.4 MG/DL (ref 8.3–10.6)
CHLORIDE BLD-SCNC: 129 MMOL/L (ref 99–110)
CO2: 20 MMOL/L (ref 21–32)
CREAT SERPL-MCNC: 0.8 MG/DL (ref 0.6–1.2)
EOSINOPHILS ABSOLUTE: 0 K/UL (ref 0–0.6)
EOSINOPHILS RELATIVE PERCENT: 0.2 %
GFR AFRICAN AMERICAN: >60
GFR NON-AFRICAN AMERICAN: >60
GLUCOSE BLD-MCNC: 128 MG/DL (ref 70–99)
GLUCOSE BLD-MCNC: 177 MG/DL (ref 70–99)
GLUCOSE BLD-MCNC: 199 MG/DL (ref 70–99)
GLUCOSE BLD-MCNC: 200 MG/DL (ref 70–99)
GLUCOSE BLD-MCNC: 238 MG/DL (ref 70–99)
GLUCOSE BLD-MCNC: 253 MG/DL (ref 70–99)
HCT VFR BLD CALC: 39.2 % (ref 36–48)
HEMOGLOBIN: 12.6 G/DL (ref 12–16)
LACTIC ACID: 2.8 MMOL/L (ref 0.4–2)
LACTIC ACID: 2.9 MMOL/L (ref 0.4–2)
LACTIC ACID: 3.2 MMOL/L (ref 0.4–2)
LACTIC ACID: 3.9 MMOL/L (ref 0.4–2)
LACTIC ACID: 4.2 MMOL/L (ref 0.4–2)
LACTIC ACID: 4.6 MMOL/L (ref 0.4–2)
LYMPHOCYTES ABSOLUTE: 2.4 K/UL (ref 1–5.1)
LYMPHOCYTES RELATIVE PERCENT: 26.8 %
MAGNESIUM: 2.7 MG/DL (ref 1.8–2.4)
MCH RBC QN AUTO: 32.5 PG (ref 26–34)
MCHC RBC AUTO-ENTMCNC: 32.1 G/DL (ref 31–36)
MCV RBC AUTO: 101.1 FL (ref 80–100)
MONOCYTES ABSOLUTE: 0.5 K/UL (ref 0–1.3)
MONOCYTES RELATIVE PERCENT: 5.9 %
NEUTROPHILS ABSOLUTE: 6 K/UL (ref 1.7–7.7)
NEUTROPHILS RELATIVE PERCENT: 66.9 %
PDW BLD-RTO: 14.9 % (ref 12.4–15.4)
PERFORMED ON: ABNORMAL
PHOSPHORUS: 2.8 MG/DL (ref 2.5–4.9)
PLATELET # BLD: 86 K/UL (ref 135–450)
PMV BLD AUTO: 10.8 FL (ref 5–10.5)
POTASSIUM SERPL-SCNC: 3.7 MMOL/L (ref 3.5–5.1)
RBC # BLD: 3.87 M/UL (ref 4–5.2)
SODIUM BLD-SCNC: 159 MMOL/L (ref 136–145)
SODIUM BLD-SCNC: 161 MMOL/L (ref 136–145)
SODIUM BLD-SCNC: 165 MMOL/L (ref 136–145)
VALPROIC ACID LEVEL: 6.2 UG/ML (ref 50–100)
WBC # BLD: 9 K/UL (ref 4–11)

## 2021-12-28 PROCEDURE — 80069 RENAL FUNCTION PANEL: CPT

## 2021-12-28 PROCEDURE — 83605 ASSAY OF LACTIC ACID: CPT

## 2021-12-28 PROCEDURE — 6360000002 HC RX W HCPCS: Performed by: STUDENT IN AN ORGANIZED HEALTH CARE EDUCATION/TRAINING PROGRAM

## 2021-12-28 PROCEDURE — 92611 MOTION FLUOROSCOPY/SWALLOW: CPT

## 2021-12-28 PROCEDURE — 84295 ASSAY OF SERUM SODIUM: CPT

## 2021-12-28 PROCEDURE — 2060000000 HC ICU INTERMEDIATE R&B

## 2021-12-28 PROCEDURE — 2580000003 HC RX 258: Performed by: INTERNAL MEDICINE

## 2021-12-28 PROCEDURE — 83735 ASSAY OF MAGNESIUM: CPT

## 2021-12-28 PROCEDURE — 99221 1ST HOSP IP/OBS SF/LOW 40: CPT | Performed by: NURSE PRACTITIONER

## 2021-12-28 PROCEDURE — 36415 COLL VENOUS BLD VENIPUNCTURE: CPT

## 2021-12-28 PROCEDURE — 6370000000 HC RX 637 (ALT 250 FOR IP): Performed by: STUDENT IN AN ORGANIZED HEALTH CARE EDUCATION/TRAINING PROGRAM

## 2021-12-28 PROCEDURE — 80164 ASSAY DIPROPYLACETIC ACD TOT: CPT

## 2021-12-28 PROCEDURE — 94761 N-INVAS EAR/PLS OXIMETRY MLT: CPT

## 2021-12-28 PROCEDURE — 92526 ORAL FUNCTION THERAPY: CPT

## 2021-12-28 PROCEDURE — 2580000003 HC RX 258: Performed by: STUDENT IN AN ORGANIZED HEALTH CARE EDUCATION/TRAINING PROGRAM

## 2021-12-28 PROCEDURE — 99223 1ST HOSP IP/OBS HIGH 75: CPT | Performed by: INTERNAL MEDICINE

## 2021-12-28 PROCEDURE — 6360000002 HC RX W HCPCS: Performed by: INTERNAL MEDICINE

## 2021-12-28 PROCEDURE — 85025 COMPLETE CBC W/AUTO DIFF WBC: CPT

## 2021-12-28 PROCEDURE — 2700000000 HC OXYGEN THERAPY PER DAY

## 2021-12-28 PROCEDURE — 74230 X-RAY XM SWLNG FUNCJ C+: CPT

## 2021-12-28 PROCEDURE — 51798 US URINE CAPACITY MEASURE: CPT

## 2021-12-28 RX ADMIN — DEXTROSE MONOHYDRATE: 5 INJECTION, SOLUTION INTRAVENOUS at 13:17

## 2021-12-28 RX ADMIN — INSULIN LISPRO 1 UNITS: 100 INJECTION, SOLUTION INTRAVENOUS; SUBCUTANEOUS at 20:52

## 2021-12-28 RX ADMIN — DEXTROSE MONOHYDRATE: 5 INJECTION, SOLUTION INTRAVENOUS at 23:05

## 2021-12-28 RX ADMIN — INSULIN LISPRO 1 UNITS: 100 INJECTION, SOLUTION INTRAVENOUS; SUBCUTANEOUS at 17:10

## 2021-12-28 RX ADMIN — THIAMINE HYDROCHLORIDE 100 MG: 100 INJECTION, SOLUTION INTRAMUSCULAR; INTRAVENOUS at 10:23

## 2021-12-28 RX ADMIN — THIAMINE HYDROCHLORIDE 100 MG: 100 INJECTION, SOLUTION INTRAMUSCULAR; INTRAVENOUS at 21:51

## 2021-12-28 RX ADMIN — SODIUM CHLORIDE 25 ML: 9 INJECTION, SOLUTION INTRAVENOUS at 21:51

## 2021-12-28 RX ADMIN — ENOXAPARIN SODIUM 30 MG: 100 INJECTION SUBCUTANEOUS at 09:00

## 2021-12-28 RX ADMIN — INSULIN LISPRO 2 UNITS: 100 INJECTION, SOLUTION INTRAVENOUS; SUBCUTANEOUS at 09:01

## 2021-12-28 RX ADMIN — Medication 10 ML: at 09:00

## 2021-12-28 ASSESSMENT — PAIN SCALES - PAIN ASSESSMENT IN ADVANCED DEMENTIA (PAINAD)
TOTALSCORE: 1
TOTALSCORE: 1
BODYLANGUAGE: 0
TOTALSCORE: 0
BREATHING: 0
TOTALSCORE: 1
BREATHING: 0
CONSOLABILITY: 0
FACIALEXPRESSION: 0
BREATHING: 0
TOTALSCORE: 1
NEGVOCALIZATION: 0
CONSOLABILITY: 0
BODYLANGUAGE: 0
BODYLANGUAGE: 0
FACIALEXPRESSION: 0
BODYLANGUAGE: 0
CONSOLABILITY: 0
BODYLANGUAGE: 0
NEGVOCALIZATION: 1
TOTALSCORE: 1
FACIALEXPRESSION: 0
TOTALSCORE: 1
NEGVOCALIZATION: 1
BODYLANGUAGE: 0
TOTALSCORE: 1
BODYLANGUAGE: 0
BODYLANGUAGE: 0
FACIALEXPRESSION: 0
BREATHING: 0
BREATHING: 0
CONSOLABILITY: 0
NEGVOCALIZATION: 0
TOTALSCORE: 1
NEGVOCALIZATION: 1
NEGVOCALIZATION: 1
CONSOLABILITY: 0
FACIALEXPRESSION: 0
CONSOLABILITY: 0
BREATHING: 0
CONSOLABILITY: 0
FACIALEXPRESSION: 0
FACIALEXPRESSION: 0
TOTALSCORE: 1
CONSOLABILITY: 0
CONSOLABILITY: 0
NEGVOCALIZATION: 1
TOTALSCORE: 1
NEGVOCALIZATION: 1
BREATHING: 0
BODYLANGUAGE: 0
NEGVOCALIZATION: 1
BODYLANGUAGE: 0
TOTALSCORE: 0
CONSOLABILITY: 0
NEGVOCALIZATION: 1
FACIALEXPRESSION: 0
FACIALEXPRESSION: 0
BREATHING: 0
FACIALEXPRESSION: 0
BODYLANGUAGE: 0
BREATHING: 0
BREATHING: 0
NEGVOCALIZATION: 1
TOTALSCORE: 1
NEGVOCALIZATION: 1
BREATHING: 0
BODYLANGUAGE: 0
NEGVOCALIZATION: 1
CONSOLABILITY: 0
FACIALEXPRESSION: 0
BREATHING: 0
BODYLANGUAGE: 0
BREATHING: 0

## 2021-12-28 ASSESSMENT — PAIN SCALES - GENERAL
PAINLEVEL_OUTOF10: 0

## 2021-12-28 NOTE — PLAN OF CARE
SLP evaluation completed. Please refer to EMR.     Betsy Luna MA CCC-SLP; [id number]  Speech-Language Pathologist

## 2021-12-28 NOTE — PROGRESS NOTES
Progress Note    Admit Date: 12/27/2021  Day: 1  Diet: Diet NPO    CC: AMS    Interval history: Pt afebrile,  to 74, Hr 120 to 91 since pt arrived to hospital. Pt is able to softly state her name but is not oriented to place or time. Lactic Acid 4.2  Na 159  Cr 0.8   mL      Medications:     Scheduled Meds:   thiamine (VITAMIN B1) IVPB  100 mg IntraVENous BID    sodium chloride flush  5-40 mL IntraVENous 2 times per day    enoxaparin  30 mg SubCUTAneous Daily    insulin lispro  0-6 Units SubCUTAneous TID WC    insulin lispro  0-3 Units SubCUTAneous Nightly    levothyroxine  50 mcg Oral QAM AC     Continuous Infusions:   [Held by provider] dextrose Stopped (12/28/21 0855)    sodium chloride      dextrose       PRN Meds:sodium chloride flush, sodium chloride, ondansetron **OR** ondansetron, polyethylene glycol, acetaminophen **OR** acetaminophen, glucose, dextrose, glucagon (rDNA), dextrose    Objective:   Vitals:   T-max:  Patient Vitals for the past 8 hrs:   BP Temp Temp src Pulse Resp SpO2   12/28/21 0854 113/66 96.3 °F (35.7 °C) Axillary 96 18 --   12/28/21 0815 -- -- -- -- -- 98 %   12/28/21 0410 (!) 95/57 96.5 °F (35.8 °C) Axillary 99 20 98 %       Intake/Output Summary (Last 24 hours) at 12/28/2021 1029  Last data filed at 12/28/2021 0417  Gross per 24 hour   Intake --   Output 400 ml   Net -400 ml       Review of Systems   Unable to perform ROS: Mental status change       Physical Exam  Constitutional:       General: She is not in acute distress. Appearance: She is obese. She is ill-appearing and toxic-appearing. She is not diaphoretic. HENT:      Head: Normocephalic and atraumatic. Eyes:      Extraocular Movements: Extraocular movements intact. Cardiovascular:      Rate and Rhythm: Normal rate and regular rhythm. Heart sounds: No murmur heard. No friction rub. No gallop. Pulmonary:      Effort: Pulmonary effort is normal.      Breath sounds:  No wheezing, rhonchi or rales.   Abdominal:      Tenderness: There is no abdominal tenderness. There is no guarding or rebound. Musculoskeletal:      Right lower leg: No edema. Left lower leg: No edema. Neurological:      Mental Status: She is alert. She is disoriented. LABS:    CBC:   Recent Labs     12/27/21  1430 12/28/21  0523   WBC 9.1 9.0   HGB 13.2 12.6   HCT 42.6 39.2   * 86*   .2* 101.1*     Renal:    Recent Labs     12/27/21  1430 12/27/21  1430 12/27/21  1649 12/28/21  0013 12/28/21  0524   *   < > 168* 165* 159*   K 5.6*  --  4.8  --  3.7   *  --  137*  --  129*   CO2 19*  --  16*  --  20*   BUN 83*  --  87*  --  74*   CREATININE 1.2  --  1.2  --  0.8   GLUCOSE 210*  --  171*  --  253*   CALCIUM 8.1*  --  7.8*  --  8.4   MG  --   --   --   --  2.70*   PHOS  --   --   --   --  2.8   ANIONGAP 15  --  15  --  10    < > = values in this interval not displayed. Hepatic:   Recent Labs     12/27/21  1430 12/28/21  0524   AST 29  --    ALT 13  --    BILITOT 0.9  --    PROT 6.9  --    LABALBU 3.0* 2.6*   ALKPHOS 55  --      Troponin:   Recent Labs     12/27/21  1430 12/27/21  1649   TROPONINI 0.11* 0.08*     BNP: No results for input(s): BNP in the last 72 hours. Lipids: No results for input(s): CHOL, HDL in the last 72 hours. Invalid input(s): LDLCALCU, TRIGLYCERIDE  ABGs:  No results for input(s): PHART, UEJ7RKO, PO2ART, ARD9IMY, BEART, THGBART, O5RALVOA, ALJ8VAO in the last 72 hours. INR: No results for input(s): INR in the last 72 hours. Lactate: No results for input(s): LACTATE in the last 72 hours. Cultures:  -----------------------------------------------------------------  RAD:   XR CHEST PORTABLE   Final Result   Impression: Elevation of the right hemidiaphragm, new from prior. No acute pulmonary abnormality. CT HEAD WO CONTRAST   Final Result      No acute intracranial abnormality. Age-related atrophy and sequelae of chronic microvascular ischemic change. FL MODIFIED BARIUM SWALLOW W VIDEO    (Results Pending)       Assessment/Plan:     Acute metabolic encephalopathy, suspected 2/2 Hypernatremia  Pt Ox1, Lactic acid 3.2. On admission pt's Na 167-168, and Free water deficit 4.3L. -Goal Na 150 for the next 24 hours  - D5W continuous at 100 mL/Hr  - Na q12H  - Nephrology following    FLOR  Uremia  On admission Cr at 1.2, and BUN 87. Pt's Baseline Cr 0.5 to 0.6.    - IVF  - avoid nephrotoxic agents  - Nephrology following    Diabetes Mellitus Type 2  Last Hgb A1c 6.2 (2/2021). Holding home DM medications. - LDSSI  - Hypoglycemia protocol  - POCT glucose check q6hr AC/HS    Essential Hypertension  Not on any home medication      Dementia with Behavioral Disturbance  Holding home dopenzil, ativan 0.5 BID, seroquel 250 QD, depakote 250mg BID, Trazodone 25qHS, olanzapine 5mg BID as pt is NPO.              - Valproic Acid level    Hypothyroidism  On admission TSH 0.43 and free T4 1.0.    - Levothyroxine 50mcg QD     Code Status: DNR-CC  FEN: Diet NPO  PPX: Lovenox  DISPO: KEITH Stewart DO, PGY-1  12/28/21  10:29 AM    This patient has been staffed and discussed with Lotus Marin MD.     Patient seen and examined, labs and imaging studies reviewed, agree with assessment and plan as outlined above. Continue with current care and plan. Discussed case with patients nurse, discussed case with care team, discussed plan.       Lotus Marin MD [address]

## 2021-12-28 NOTE — PROGRESS NOTES
Notified MD resident about patient not voiding since martinez removal. Pt bladder scan showed 157. No new orders. RN also requested diet per speech recs. No new orders at this time.

## 2021-12-28 NOTE — PROGRESS NOTES
List of Home Medications the patient is currently taking is complete. Home Medication list in EPIC updated to reflect changes noted below. Source of medications in list is patient MAR from [address]. Please note:   Called facility for medication last doses, no answer. Will try back later. The following medications were added to admission medication list:  Acetaminophen 650 mg  Maalox susp. 30 mL  Ammonium lactate PRN  Divalproex 250 mg  Enoxaparin 40 mg  Lorazepam 0.5 mg  MgOH susp. 30 mL  Olanzapine 5 mg  KCl 20 mEq  Pyrithione 1% shampoo  Quetiapine 50 mg and 200 mg  Trazodone 25 mg    The following medications were removed from admission medication list:  Risperidone 0.5 mg BID    The following medication instructions/doses were adjusted to reflect how patient is taking:  Donepezil 5 mg QHS - changed from QAM      Please call with questions. Mary Babcock - PharmD Candidate [id number] [facility] [id number]  12/27/2021 8:46 PM      Updated medication list 12/27/2021  Medication Sig    ammonium lactate (LAC-HYDRIN) 12 % lotion Apply topically daily as needed for Dry Skin To face/dry skin    aluminum & magnesium hydroxide-simethicone (MAALOX) 200-200-20 MG/5ML SUSP suspension Take 30 mLs by mouth every 4 hours as needed for Indigestion    LORazepam (ATIVAN) 0.5 MG tablet Take 0.5 mg by mouth 2 times daily.     divalproex (DEPAKOTE) 250 MG DR tablet Take 250 mg by mouth 2 times daily    potassium chloride (KLOR-CON M) 20 MEQ extended release tablet Take 20 mEq by mouth every other day    magnesium hydroxide (MILK OF MAGNESIA) 400 MG/5ML suspension Take 30 mLs by mouth daily as needed for Constipation    enoxaparin (LOVENOX) 40 MG/0.4ML injection Inject into the skin every evening    OLANZapine (ZYPREXA) 5 MG tablet Take 5 mg by mouth 2 times daily    pyrithione zinc (HEAD AND SHOULDERS) 1 % shampoo Apply topically On Friday and Sunday    QUEtiapine (SEROQUEL) 200 MG tablet Take 200 mg by mouth nightly  QUEtiapine (SEROQUEL) 50 MG tablet Take 50 mg by mouth daily    traZODone (DESYREL) 25 mg TABS Take 25 mg by mouth nightly as needed    acetaminophen (TYLENOL) 325 MG tablet Take 650 mg by mouth every 6 hours as needed for Pain    donepezil (ARICEPT) 5 MG tablet Take 1 tablet by mouth daily (with breakfast) (Patient taking differently: Take 5 mg by mouth nightly )    levothyroxine (SYNTHROID) 50 MCG tablet Take 1 tablet by mouth daily

## 2021-12-28 NOTE — PROGRESS NOTES
Speech Language Pathology  Facility/Department: [place] [address] [facility]   CLINICAL BEDSIDE SWALLOW EVALUATION    NAME: Everlean Lesches  : 1945  MRN: [id number]    ADMISSION DATE: 2021  ADMITTING DIAGNOSIS: has Hypothyroidism (acquired); Prediabetes; Anxiety; Postmenopausal; Essential hypertension; Bacterial urinary tract infection; Hypokalemia; Encephalopathy due to infection; Acute metabolic encephalopathy; Fall (on) (from) other stairs and steps, initial encounter; Major neurocognitive disorder due to Alzheimer's disease, with behavioral disturbance ([facility] Hypernatremia; FLOR (acute kidney injury) ([facility] and Failure to thrive in adult on their problem list.  ONSET DATE: 21    Recent Chest Xray/CT of Chest: (21)  Impression: Elevation of the right hemidiaphragm, new from prior.       No acute pulmonary abnormality. Date of Eval: 2021  Evaluating Therapist: JONG Pavon    Current Diet level:  Current Diet : NPO  Current Liquid Diet : NPO      Primary Complaint  Patient Complaint: unable to state    Pain:  None indicated    Reason for Referral  Everlean Lesches was referred for a bedside swallow evaluation to assess the efficiency of her swallow function, identify signs and symptoms of aspiration and make recommendations regarding safe dietary consistencies, effective compensatory strategies, and safe eating environment. Impression  Dysphagia Diagnosis: Suspected needs further assessment  Dysphagia Impression : Pt presents with indicators of oropharyngeal dysphagia. Pt with hyperflexed chin t/o session w/o stimulability to reposition - suspect immediate posterior loss of thin liquids. Pt without initiation to take liquids via straw or bite off solid initially but then after delay demonstrated adequate labial seal + mandibular movement to take in PO. Slowed posterior transit of ice chips and solids.  Pt with impaired mastication of soft solid with diffuse residue and majority of bolus remaining, requiring liquid rinses to partially clear. Attempted use of swab to clear significant lingual residue but limited success as pt biting down on toothette. With all trials, pt with multiple swallows and audible gurgling even with single ice chip or tsp thin concerning for pharyngeal residue. With thin liquids via straw taken sequentially, pt with severe reactive coughing with audible gurgling and concern for brief airway obstruction which eventually cleared. Pt with hypoxia following episode and drop in O2 sats to 81%. PO trials d/c'd and pt eventually returned to >95% O2 saturation. MBS would be indicated for full evaluation of swallow. POA in agreement for procedure at this time to assist with making informed decision re: diet consistency. Dysphagia Outcome Severity Scale: Level 2: Moderate Severe dysphagia- Maximum assistance or maximum use of strategies with partial PO only     Treatment Plan  Requires SLP Intervention: Yes  Duration/Frequency of Treatment: 1x f/u for MBS; further recs TBD s/p MBS  D/C Recommendations: To be determined       Recommended Diet and Intervention  Diet Solids Recommendation:  (TBD s/p MBS)  Liquid Consistency Recommendation:  (TBD s/p MBS)  Recommended Form of Meds:  (TBD s/p MBS)  Recommendations: Modified barium swallow study       Compensatory Swallowing Strategies  Compensatory Swallowing Strategies:  (TBD s/p MBS)    Treatment/Goals  Dysphagia Goals: The patient will tolerate instrumental swallowing procedure    Pt goal: unable to state    General  Chart Reviewed: Yes  Comments: Pt admitted 12/27/21 s/p being found unresponsive at nursing facility - w/u in ED indicated hypernatremia / dehydration. PMHx significant for severe dementia, schizophrenia, and recent hospitalization at OSH for [facility].   Subjective  Subjective: Pt in bed upon entry, perseverative on \"I try my best.\" Cooperative although does not follow any directions and oriented to self only via Y / N questions. Behavior/Cognition: Alert; Cooperative;Pleasant mood; Doesn't follow directions  Temperature Spikes Noted: No (afebrile)  Respiratory Status: O2 via nasual cannula  Breath Sounds: Diminished (per RN documentation)  O2 Device: Nasal cannula  Liters of Oxygen: 3 L  Communication Observation:  (perseverative, limited output)  Follows Directions: None  Dentition: Poor dental/oral hygeine; Adequate  Patient Positioning: Upright in bed  Baseline Vocal Quality: Normal  Prior Dysphagia History: SLP evaluated during admission at OSH - per evaluation 12/10/21: \"High suspicion for aspiration. POA/Rachna reports patient would not want thickened liquids nor feeding tube. Declined Modified Barium Swallow. Recommend Dysphagia 3 and thin liquids for quality of life. No straws. Strict aspiration precautions: upright for all intake, small bites/sips, slow rate and remain upright for 30-60 minutes after meals. Patient requires full assistance for feeding. \" Per documentation received from nursing home, pt on dysphagia advanced + mildly thick (nectar thick) liquids with note that if pt has inadequate intake of fluids, may be allowed thins per POA. Consistencies Administered: Thin - straw; Thin - teaspoon; Ice Chips (soft regular; gelatin)           Vision/Hearing  Vision  Vision: Within Functional Limits (for tasks presented)  Hearing  Hearing: Within functional limits (for tasks presented)    Oral Motor Deficits  Oral/Motor  Oral Motor: Exceptions to [address] (MATHIEU - pt did not follow directions)    Oral Phase Dysfunction  Oral Phase  Oral Phase: Exceptions  Oral Phase Dysfunction  Impaired Mastication: Soft Solid  Decreased Anterior to Posterior Transit: Soft solid  Lingual/Palatal Residue: Soft solid     Indicators of Pharyngeal Phase Dysfunction   Pharyngeal Phase  Pharyngeal Phase: Exceptions  Indicators of Pharyngeal Phase Dysfunction  Cough - Immediate: Thin - straw  Change in Vital Signs:  Thin - straw    Prognosis  Prognosis  Prognosis for safe diet advancement: fair  Barriers to reach goals: cognitive deficits;severity of dysphagia  Individuals consulted  Consulted and agree with results and recommendations: Patient;RN (POA)    Education  Patient Education: Attempted to educate pt to role of SLP and recommendations. D/W POA via phone after session - educated to role of SLP, rationale for BSE, MBS procedure, rationale for MBS, concerns for dehydration, and when MBS would not be indicated (ie not change POC / if POA wants to elect diet for QOL regardless of risks). POA endorsed comprehension of all information and identified that pt may be hospice appropriate soon but would like to proceed with MBS at this time to obtain information on pt's current swallow function to make decision re: PO.  Patient Education Response: No evidence of learning  Safety Devices in place: Yes  Type of devices: All fall risk precautions in place       Therapy Time  SLP Individual Minutes  Time In: 0745  Time Out: 0800  Minutes: 15  SLP Co-Treatment Minutes  Time In: 0000  Time Out: 0000  Minutes: 0  SLP Total Treatment Time  Timed Code Treatment Minutes: 0 Minutes  Total Treatment Time: DIEGO Mirza CCC-SLP; [id number]  Speech-Language Pathologist  Pager # [phone]  Phone # [phone]  Office # [phone]    If patient is discharged prior to next session, this note will serve as discharge summary.

## 2021-12-28 NOTE — PROCEDURES
INSTRUMENTAL SWALLOW REPORT  MODIFIED BARIUM SWALLOW/treat    NAME: Radha Bedoya   : 1945  MRN: [id number]       Date of Eval: 2021     Ordering Physician: Dr. Natasha Houston  Radiologist: Dr. Tori Quezada     Referring Diagnosis(es): Referring Diagnosis: hypernatremia    Past Medical History:  has a past medical history of Anxiety, Dementia ([address] Hypothyroidism, Irritable bowel syndrome, and Prediabetes. Past Surgical History:  has a past surgical history that includes Hysterectomy. Current Diet Solid Consistency: NPO  Current Diet Liquid Consistency: NPO    Type of Study: Repeat MBS      Recent Chest Xray/CT of Chest: (21)  Impression: Elevation of the right hemidiaphragm, new from prior.       No acute pulmonary abnormality.       Patient Complaints/Reason for Referral:  Radha Bedoya was referred for a MBS to assess the efficiency of his/her swallow function, assess for aspiration, and to make recommendations regarding safe dietary consistencies, effective compensatory strategies, and safe eating environment. Onset of problem:   Date of Onset: 21     Behavior/Cognition/Vision/Hearing:  Behavior/Cognition: Alert; Cooperative;Confused  Vision: Within Functional Limits  Hearing: Within functional limits    Impressions:  Pt exhibiting mod oropharyngeal dysphagia. (study limited due to equipment malfunction at end of study). It should also be noted that pt was able to be positioned upright with head in more neutral position than during bedside eval.  Oral phase characterized by reduced lingual manipulation /propulsion of bolus posteriorly in oral cavity, more so with puree and nectar liquids. Premature loss of bolus to pyriforms occurs with all consistencies. Reduced hyolaryngeal mechanics and reduced tongue base retraction result in residue in valleculae and pyriform sinus cavities with all consistencies, more so with thicker texture.  Thin liquid bolus via tsp/cup spill over from [email or web address] into laryngeal vestibule, with mod penetration that was not self clearing. No throat clear or cough noted, however not aspiration was identified. Pt consumed nectar thick liquids via cup and thin liquid via straw with no penetration or aspiration, and reduction of residue. Vocal quality remained clear throughout study (gurgly vocal quality noted during bedside evaluation). Pt not able to follow directions for compensatory strategies due to cognitive impairment. Was not able to assess solids or additional trials of thin, due to equipment malfunction. Treatment Dx and ICD 10: oropharyngeal dysphagia   Patient Position: Lateral and   Consistencies Administered: Dysphagia Pureed (Dysphagia I); Thin straw; Thin teaspoon; Thin cup;Nectar  teaspoon    Dysphagia Outcome Severity Scale: Level 3: Moderate dysphagia- Total assisstance, supervision or strategies. Two or more diet consistencies restricted  Penetration-Aspiration Scale (PAS): 3 - Material enters the airway, remains above the vocal folds, and is not ejected from airway    Recommended Diet:  Solid consistency: Dysphagia Pureed (Dysphagia I)  Liquid consistency: Thin  Liquid administration via: Straw    Medication administration: Meds in puree (crushed)    Safe Swallow Protocol: 1:1 assistance with meals  Upright as possible for all oral intake  Use STRAW for liquids - single sips  Eat/Feed slowly  Alternate solids and liquids  Remain upright for 30-45 minutes after meals  Check for pocketing of food   Small bites/sips    Recommendations/Treatment  Requires SLP Intervention: Yes  D/C Recommendations: To be determined     Recommended Exercises:    Therapeutic Interventions: Diet tolerance monitoring;Oral care; Patient/Family education    Education: Images and recommendations were reviewed with pt following this exam.   Patient Education: Pt educated to results of MBS.    Patient Education Response: No evidence of learning (per pt)    Prognosis for safe diet advancement: fair  Barriers to reach goals: cognitive deficits  Duration/Frequency of Treatment  Duration/Frequency of Treatment: 2-3 x      Goals:    1- Pt will participate in [address]  12/28: goal met  2- The patient will tolerate recommended diet without observed clinical signs of aspiration    3- The patient Sal[place] Dong will verbalize/demonstrate understanding of dysphagia recommendations  12/28: Results of MBS communicated to POA via phone. Explained rationale for diet recommendation. POA stated understanding and appreciative of update  Goal met, cont as appropriate    Oral Preparation / Oral Phase  Oral Phase: Impaired  Oral Phase - Major Contributing Deficits  Weak Lingual Manipulation: Puree  Reduced Posterior Propulsion: Puree  Premature Bolus Loss to Pyriforms: Thin cup; Thin straw    Pharyngeal Phase  Pharyngeal Phase: Impaired  Pharyngeal Phase - Major Contributing Deficits  Premature Spillage to Pyriform: All  Mod Penetration After: Thin cup/tsp  Pharyngeal Residue - valleculae/Pyriform: Puree;Nectar teaspoon; Thin cup; Thin teaspoon    Esophageal Phase  Slow clearance of UES    Pain   Patient Currently in Pain: No    Therapy Time:   Individual Concurrent Group Co-treatment   Time In 1145 0000 0000 0000   Time Out 1200 0000 0000 0000   Minutes 15 0 0 0     Plan:  Diet recommendation: Puree/thin liquids via straw (single sips) - will assess pt at bedside to determine ability to upgrade texture  Dc recommendation: ongoing treatment indicated  Taylor Gilliam M.S./[facility]-SLP #[id number]  Pg. # [phone]  Needs met prior to leaving radiology.  Results d/w  Lebron Braswell, SLP, 12/28/2021, 12:40 PM

## 2021-12-28 NOTE — PROGRESS NOTES
Pt admitted to room 4322. Vitals, assessment, and 4-eye skin assessment completed (see below). Pt repositioned and cleaned as needed. Thick, sticky secretions noted in mouth; mouth care performed. 4 Eyes Admission Assessment     I agree as the admission nurse that 2 RN's have performed a thorough Head to Toe Skin Assessment on the patient. ALL assessment sites listed below have been assessed on admission. Areas assessed by both nurses:   [x]   Head, Face, and Ears     Knot mid forehead  [x]   Shoulders, Back, and Chest   [x]   Arms, Elbows, and Hands    BUE Scattered bruising/abrasions   Callous L elbow  [x]   Coccyx, Sacrum, and Ischum   Coccyx stage 2; blanchable redness  [x]   Legs, Feet, and Heels   Bilateral ankles and heels blanchable redness; R 5th toe blanchable redness; Outside of R foot blanchable redness and bruising/DTI    Does the Patient have Skin Breakdown?   Yes a wound was noted on the Admission Assessment and an LDA was Initiated documentation include the Kimber-wound, Wound Assessment, Measurements, Dressing Treatment, Drainage, and Color\",         Shaji Prevention initiated:  Yes   Wound Care Orders initiated:  Yes      [id number] nurse consulted for Pressure Injury (Stage 3,4, Unstageable, DTI, NWPT, and Complex wounds):  No      Nurse 1 eSignature: Electronically signed by Zaria Hawk RN on 12/28/21 at 12:20 AM EST    **SHARE this note so that the co-signing nurse is able to place an eSignature**    Nurse 2 eSignature: Electronically signed by Bud Gonzales RN on 12/47/45 at 1:58 AM EST

## 2021-12-28 NOTE — PROGRESS NOTES
Critical lactic 4.21 called this AM. Resident notified. Awaiting further orders.  Electronically signed by Carmelita Tomlin RN on 12/28/2021 at 6:22 AM

## 2021-12-28 NOTE — H&P
smoked. She has never used smokeless tobacco.  · ETOH:   reports no history of alcohol use. · DRUGS : unknown  · Patient currently lives Nursing home  ·   Family History:       Problem Relation Age of Onset    Thyroid Disease Sister     No Known Problems Sister     No Known Problems Son     No Known Problems Son     No Known Problems Daughter        Review of Systems   Unable to perform ROS: Patient unresponsive      ROS: A 10 point review of systems was conducted, significant findings as noted in HPI. Physical Exam  Constitutional:       General: She is not in acute distress. Appearance: She is ill-appearing. She is not toxic-appearing or diaphoretic. Comments: Alert but confused and does not answer appropriately or follow commands   HENT:      Head: Normocephalic and atraumatic. Right Ear: External ear normal.      Left Ear: External ear normal.      Nose: Nose normal.      Mouth/Throat:      Mouth: Mucous membranes are dry. Pharynx: Oropharynx is clear. Eyes:      Extraocular Movements: Extraocular movements intact. Pupils: Pupils are equal, round, and reactive to light. Cardiovascular:      Rate and Rhythm: Regular rhythm. Tachycardia present. Heart sounds: Normal heart sounds. No murmur heard. No friction rub. No gallop. Pulmonary:      Effort: Pulmonary effort is normal. No respiratory distress. Breath sounds: No stridor. Rales (mild basilar) present. No wheezing or rhonchi. Comments: On 5L O2  Abdominal:      General: Bowel sounds are normal. There is no distension. Palpations: Abdomen is soft. There is no mass. Tenderness: There is no abdominal tenderness. There is no guarding or rebound. Hernia: No hernia is present. Musculoskeletal:      Right lower leg: No edema. Left lower leg: No edema. Skin:     General: Skin is dry. Neurological:      Mental Status: She is disoriented.        Physical exam:       Vitals:    12/27/21 2115 BP: 99/75   Pulse: 97   Resp: 21   Temp:    SpO2: 95%       DATA:    Labs:  CBC:   Recent Labs     12/27/21  1430   WBC 9.1   HGB 13.2   HCT 42.6   *       BMP:   Recent Labs     12/27/21  1430 12/27/21  1649   * 168*   K 5.6* 4.8   * 137*   CO2 19* 16*   BUN 83* 87*   CREATININE 1.2 1.2   GLUCOSE 210* 171*     LFT's:   Recent Labs     12/27/21  1430   AST 29   ALT 13   BILITOT 0.9   ALKPHOS 55     Troponin:   Recent Labs     12/27/21  1430 12/27/21  1649   TROPONINI 0.11* 0.08*     BNP:No results for input(s): BNP in the last 72 hours. ABGs: No results for input(s): PHART, IJT8VAN, PO2ART in the last 72 hours. INR: No results for input(s): INR in the last 72 hours. U/A:  Recent Labs     12/27/21  1515   COLORU Yellow   PHUR 5.5   WBCUA 3-5   RBCUA 0-2   MUCUS 2+*   BACTERIA 1+*   CLARITYU Clear   SPECGRAV >=1.030   LEUKOCYTESUR Negative   UROBILINOGEN 0.2   BILIRUBINUR SMALL*   BLOODU Negative   GLUCOSEU Negative   AMORPHOUS 1+       XR CHEST PORTABLE   Final Result   Impression: Elevation of the right hemidiaphragm, new from prior. No acute pulmonary abnormality. CT HEAD WO CONTRAST   Final Result      No acute intracranial abnormality. Age-related atrophy and sequelae of chronic microvascular ischemic change. ASSESSMENT AND PLAN:  Rigo Rodriguez is a 64 y.o. female, who presented with AMS.     Acute metabolic encephalopathy 2/2 electrolyte derangement (Hypernatremia)/dehydration/lower suspicion for sepsis  Lactic acid at 4.1 on admission, Received sepsis LR 30ml/kg on admission ~1.5L   SIRS 2/4 tachypnea, tachycardia  WBC WNL Recent COVID  UA low suspicion for infection  11-20 hyaline casts, 2+ mucus, 1+ bacteria  CT Head reports no acute intracranial abnormality  CXR reports No acute pulmonary abnormality  - IVF; D5 100ml/hr for hypernatremia  - holding abx d/t lower suspicion for infection   - Vanc and Cefepime in ED  Hypernatremia  Na at 167-168 on admission. Free water deficit at 4.3L  -on IVF D5 infusion, can slow rate if correction is too quick  -q4hr Na checks  - Nephrology consulted, Appreciate recs  FLOR;  Uremia  Cr at 1.2 on admission, BUN at 87  Baseline Cr 0.5-0.6  - IVF as above  - avoid nephrotoxic agents  - Consult nephrology    Diabetes Mellitus Type 2  Holding home DM medications  - LDSSI  - Hypoglycemia protocol  - POCT glucose check q6hr AC/HS    Essential Hypertension  - Not on any home medication    Dementia  - holding home Donepezil NPO    Anxiety  - holding home ativan 0.5 BID, seroquel 250 QD, depakote 250mg BID, Trazodone 25qHS, olanzapine 5mg BID as pt is NPO   - Resume when appropriate  - Obtain valproic acid level    Hypothyroidism  - Continue levothyroxine 50mcg QD      Will discuss with attending physician Pamella Peraza MD    Code Status:DNR-CC  FEN: Diet NPO  PPX: lovenox  DISPO: KEITH Lepe DO PGY-1  12/27/2021,  10:52 PM

## 2021-12-28 NOTE — PLAN OF CARE
Problem: Skin Integrity:  Goal: Will show no infection signs and symptoms  Description: Will show no infection signs and symptoms  Outcome: Ongoing     Problem: Skin Integrity:  Goal: Absence of new skin breakdown  Description: Absence of new skin breakdown  12/28/2021 1042 by Kenneth Scott RN  Outcome: Ongoing  Note: No new evidence of skin breakdown noted. Wound nurse consulted for pt skin issues. Will continue to turn pt Q2H     Problem: Falls - Risk of:  Goal: Will remain free from falls  Description: Will remain free from falls  12/28/2021 1042 by Kenneth Scott RN  Outcome: Ongoing  Note: Pt remaining free from falls thus far this shift.      Problem: Mental Status - Impaired:  Goal: Mental status will improve  Description: Mental status will improve  12/28/2021 1042 by Kenneth Scott RN  Outcome: Ongoing

## 2021-12-28 NOTE — PLAN OF CARE
Problem: Skin Integrity:  Goal: Absence of new skin breakdown  Description: Absence of new skin breakdown  Outcome: Ongoing  Note: Pt has wounds (see flowsheets). Pt turned and repositioned q2 hours and juliet care performed as needed. No new skin breakdown this shift. Problem: Falls - Risk of:  Goal: Will remain free from falls  Description: Will remain free from falls  Outcome: Ongoing  Note: Pt is a Fall Risk. See Navarro Dadds Fall Risk Score. Pt bed in low position and side rails up. Call light and belongings in reach. Pt encouraged to call for assistance. Will continue with hourly rounds for PO intake, pain needs, toileting, and repositioning as needed. Problem: Mental Status - Impaired:  Goal: Mental status will improve  Description: Mental status will improve  Outcome: Ongoing  Note: Pt remains minimally responsive tonight. Responsive to voice and pain only.

## 2021-12-28 NOTE — CONSULTS
100 mL IVPB, BID  [Held by provider] dextrose 5 % solution, Continuous  sodium chloride flush 0.9 % injection 5-40 mL, 2 times per day  sodium chloride flush 0.9 % injection 5-40 mL, PRN  0.9 % sodium chloride infusion, PRN  enoxaparin (LOVENOX) injection 30 mg, Daily  ondansetron (ZOFRAN-ODT) disintegrating tablet 4 mg, Q8H PRN   Or  ondansetron (ZOFRAN) injection 4 mg, Q6H PRN  polyethylene glycol (GLYCOLAX) packet 17 g, Daily PRN  acetaminophen (TYLENOL) tablet 650 mg, Q6H PRN   Or  acetaminophen (TYLENOL) suppository 650 mg, Q6H PRN  glucose (GLUTOSE) 40 % oral gel 15 g, PRN  dextrose 50 % IV solution, PRN  glucagon (rDNA) injection 1 mg, PRN  dextrose 5 % solution, PRN  insulin lispro (1 Unit Dial) 0-6 Units, TID WC  insulin lispro (1 Unit Dial) 0-3 Units, Nightly  levothyroxine (SYNTHROID) tablet 50 mcg, QAM AC        Review of Systems:   AMS     Physical exam:     Vitals:  /66   Pulse 96   Temp 96.3 °F (35.7 °C) (Axillary)   Resp 18   Wt 105 lb 9.6 oz (47.9 kg)   SpO2 98%   BMI 17.04 kg/m²   Constitutional:  Disoriented. NAD. Appears weak and ill  Skin: no rash, turgor wnl  Heent:  eomi, mmm  Neck: no bruits or jvd noted  Cardiovascular:  S1, S2 without m/r/g  Respiratory: CTA B without w/r/r  Abdomen:  +bs, soft, nt, nd  Ext: no lower extremity edema  Psychiatric: mood and affect appropriate  Musculoskeletal:  Rom, muscular strength intact    Data:   Labs:  CBC:   Recent Labs     12/27/21  1430 12/28/21  0523   WBC 9.1 9.0   HGB 13.2 12.6   * 86*     BMP:    Recent Labs     12/27/21  1430 12/27/21  1430 12/27/21  1649 12/28/21  0013 12/28/21  0524   *   < > 168* 165* 159*   K 5.6*  --  4.8  --  3.7   *  --  137*  --  129*   CO2 19*  --  16*  --  20*   BUN 83*  --  87*  --  74*   CREATININE 1.2  --  1.2  --  0.8   GLUCOSE 210*  --  171*  --  253*    < > = values in this interval not displayed.      Ca/Mg/Phos:   Recent Labs     12/27/21  1430 12/27/21  1649 12/28/21  0524 FLOR likely in setting of dehydration  -1.2 on admission now 0.8.  -UA with elevated specific gravity c/w dehydration    4. Acid- base/ Electrolyte imbalance   -optimyze lytes and replace as needed    Avoid nephrotoxic agents  Monitor I&Os  Optimize lytes      Thank you for allowing us to participate in care of Temo Kelley      staffed with Dr. Marie MD  Feel free to contact me   [facility] [address]  Office : [phone]  Fax :[phone]    Agree with plan above   Replace free water  Ur studies reviewed     Geronimo Massey MD

## 2021-12-28 NOTE — CARE COORDINATION
ADDENDUM: Hospice RN Pasha Shields called SW, she will be her within the next hour to see Pt. Pt will DC trudy at 11:30 am via hospice. Case Management Note    SW spoke w/palli care, Pt's guardian is wanting Pt to return to [facility] w/hospice. SW spoke w/Jose L at [facility], [address] is private pay LTC there and will be able to return w/hospice. They use [facility]. SW will follow up w/jose l in admissions when Pt is ready for DC. SW sent referral to [facility], they will contact SW w/meeting time once it is scheduled. SW following for DCP.     Verenice Sue, MSW, LSW  Social Work/Case Management   ICU/PCU - [facility]   Ph: [phone]

## 2021-12-28 NOTE — CONSULTS
[facility]  Palliative Medicine Consultation Note      Date Of Admission:12/27/2021  Date of consult: 12/28/21  Seen by [facility] in the past:  No    Recommendations:        Pt is alert and answers \"yes\" to all my questions. She is unable to answer orientation questions or follow commands. She does not seem to be in any distress. Called her court appointed guardian Tinoandrea Kaminski and gave her an update on pt's status. We discussed the plan for MBS. If pt required PEG vs hospice, Ms Angeles Aguayo would opt for hospice and feeding her by mouth for quality of life with the acceptance of aspiration risk. The pt has been nonambulatory for a long time and has required total care since she got Covid. She was able to engage with other residents and nursing home staff more before she got Covid. Her family has not been involved, and live in [place], but Ms Angeles Aguayo will attempt to contact them to notify them of pt's condition (previous attempts to contact them have been unsuccessful). Ms Angeles Aguayo is familiar with hospice from other clients and would use whichever hospice [facility] typically uses. D/w RN and CM. 1. Goals of Care/Advanced Care planning/Code status: DNR-CC, confirmed with guardian. Her guardian would like to proceed with MBS, and would opt for hospice rather than PEG. 2. Pain: pt is unable to report pain due to her dementia. She does not appear to be in any acute distress. 3. SOB: breathing comfortably on 3 L oxygen. She apparently desated when drinking water. Recent Covid infection and was hospitalized 12/9-12/12 for Covid. She's now out of isolation. 4. Dysphagia: worked with SLP and plan for MBS today. Guardian would opt for hospice rather than PEG if she fails MBS.   5. Disposition: d/c to [facility], possibly with hospice    Reason for Consult:         [x]  Goals of Care  [x]  Code Status Discussion/Advanced Care Planning   []  Psychosocial/Family Support  []  Symptom Management  []  Other (Specify)    Requesting Physician: Dr. Mervin Thompson    CHIEF COMPLAINT:  AMS    History Obtained From:  non-family caregiver - legal guardian, electronic medical record    History of Present Illness:         Sofia Gerard is a 64 y.o. female with PMH of dementia, DMII, HTN who presented with altered mental status from [address] [place] [address]. Pt was reportedly found lethargic by her nurse, baseline unclear. Of note, pt was recently admitted at [facility] from 12/9-12/12 for hypoxic respiratory failure 2/2 COVID19 PNA. Pt was reportedly a [facility] on her last admission. She has a court appointed guardian. In the ED pt was Patient was noted to have severe hypernatremia and uremia with FLOR. Vilma Rowell has an elevated lactate 4.0>4.1.  This may all be due to severe dehydration, but is also concerning for severe sepsis her blood pressure improved after 30 mL/KG IV fluid bolus.  Her mental status was also improved and she was slightly more alert and interactive, but still quite confused. Subjective:         Past Medical History:        Diagnosis Date    Anxiety     Dementia ([address]     Hypothyroidism     Irritable bowel syndrome     Prediabetes        Past Surgical History:        Procedure Laterality Date    HYSTERECTOMY         Current Medications:    Medications Prior to Admission: ammonium lactate (LAC-HYDRIN) 12 % lotion, Apply topically daily as needed for Dry Skin To face/dry skin  aluminum & magnesium hydroxide-simethicone (MAALOX) 200-200-20 MG/5ML SUSP suspension, Take 30 mLs by mouth every 4 hours as needed for Indigestion  LORazepam (ATIVAN) 0.5 MG tablet, Take 0.5 mg by mouth 2 times daily.   divalproex (DEPAKOTE) 250 MG DR tablet, Take 250 mg by mouth 2 times daily  potassium chloride (KLOR-CON M) 20 MEQ extended release tablet, Take 20 mEq by mouth every other day  magnesium hydroxide (MILK OF MAGNESIA) 400 MG/5ML suspension, Take 30 mLs by mouth daily as needed for Constipation  enoxaparin (LOVENOX) 40 MG/0.4ML injection, Inject into the skin every evening  OLANZapine (ZYPREXA) 5 MG tablet, Take 5 mg by mouth 2 times daily  pyrithione zinc (HEAD AND SHOULDERS) 1 % shampoo, Apply topically On Friday and Sunday  QUEtiapine (SEROQUEL) 200 MG tablet, Take 200 mg by mouth nightly  QUEtiapine (SEROQUEL) 50 MG tablet, Take 50 mg by mouth daily  traZODone (DESYREL) 25 mg TABS, Take 25 mg by mouth nightly as needed  acetaminophen (TYLENOL) 325 MG tablet, Take 650 mg by mouth every 6 hours as needed for Pain  donepezil (ARICEPT) 5 MG tablet, Take 1 tablet by mouth daily (with breakfast) (Patient taking differently: Take 5 mg by mouth nightly )  levothyroxine (SYNTHROID) 50 MCG tablet, Take 1 tablet by mouth daily    Allergies:  Patient has no known allergies. Social History:    · TOBACCO: reports that she has never smoked. She has never used smokeless tobacco.  · ETOH:   reports no history of alcohol use. · Patient currently lives in a nursing home    Review of Systems -   Review of Systems: Unable to obtain due to advanced dementia    Objective:        Physical Exam  Constitutional:       General: She is not in acute distress. HENT:      Head: Normocephalic and atraumatic. Cardiovascular:      Rate and Rhythm: Normal rate and regular rhythm. Pulmonary:      Effort: Pulmonary effort is normal.      Breath sounds: Normal breath sounds. Abdominal:      General: Bowel sounds are normal.      Palpations: Abdomen is soft. Musculoskeletal:         General: No swelling. Skin:     General: Skin is warm and dry. Comments: Dressings to bilateral feet   Neurological:      Mental Status: She is alert. Comments: Unable to answer any orientation questions or follow commands. Answers 'yes' to all questions.           Palliative Performance Scale:  [] 60% Ambulation reduced; Significant disease; Can't do hobbies/housework; intake normal or reduced; occasional assist; LOC full/confusion  [] 50% Mainly sit/lie; Extensive disease; Can't do any work; Considerable assist; intake normal  Or reduced; LOC full/confusion  [] 40% Mainly in bed; Extensive disease; Mainly assist; intake normal or reduced; occasional assist; LOC full/confusion  [x] 30% Bed Bound; Extensive disease; Total care; intake reduced; LOC full/confusion  [] 20% Bed Bound; Extensive disease; Total care; intake minimal; Drowsy/coma  [] 10% Bed Bound; Extensive disease; Total care; Mouth care only; Drowsy/coma  [] 0% Death      Vitals:    BP (!) 95/57   Pulse 99   Temp 96.5 °F (35.8 °C) (Axillary)   Resp 20   Wt 105 lb 9.6 oz (47.9 kg)   SpO2 98%   BMI 17.04 kg/m²     Labs:    BMP:   Recent Labs     12/27/21  1430 12/27/21  1430 12/27/21  1649 12/28/21  0013 12/28/21  0524   *   < > 168* 165* 159*   K 5.6*  --  4.8  --  3.7   *  --  137*  --  129*   CO2 19*  --  16*  --  20*   BUN 83*  --  87*  --  74*   CREATININE 1.2  --  1.2  --  0.8   GLUCOSE 210*  --  171*  --  253*    < > = values in this interval not displayed. CBC:   Recent Labs     12/27/21  1430 12/28/21  0523   WBC 9.1 9.0   HGB 13.2 12.6   HCT 42.6 39.2   * 86*       LFT's:   Recent Labs     12/27/21  1430   AST 29   ALT 13   BILITOT 0.9   ALKPHOS 55     Troponin:   Recent Labs     12/27/21  1430 12/27/21  1649   TROPONINI 0.11* 0.08*     BNP: No results for input(s): BNP in the last 72 hours. ABGs: No results for input(s): PHART, PZS4RRH, PO2ART in the last 72 hours. INR: No results for input(s): INR in the last 72 hours. U/A:  Recent Labs     12/27/21  1515   COLORU Yellow   PHUR 5.5   WBCUA 3-5   RBCUA 0-2   MUCUS 2+*   BACTERIA 1+*   CLARITYU Clear   SPECGRAV >=1.030   LEUKOCYTESUR Negative   UROBILINOGEN 0.2   BILIRUBINUR SMALL*   BLOODU Negative   GLUCOSEU Negative   AMORPHOUS 1+       XR CHEST PORTABLE   Final Result   Impression: Elevation of the right hemidiaphragm, new from prior. No acute pulmonary abnormality.       CT HEAD WO CONTRAST   Final Result No acute intracranial abnormality. Age-related atrophy and sequelae of chronic microvascular ischemic change. Conclusion/Time spent:         Recommendations see above    Pt [id number], Guardian [id number]  Time spent with patient and/or family: 15 min  Time reviewing records: 10 min   Time communicating with staff: 10 min     A total of 35 minutes spent with the patient and family on unit greater than 50% in counseling regarding palliative care and in goals of care for the patient. Thank you to Dr. Aretha Saucedo for this consultation. We will continue to follow Ms. [facility] as needed.     Gwyn Rowland, AMRIT  [address]

## 2021-12-28 NOTE — PROGRESS NOTES
Attempted to Ness Bleacher (guardian) this morning. Voicemail left regarding admission to floor. Unit number left.      Electronically signed by Velasquez Hemphill RN on 12/28/2021 at 6:51 AM

## 2021-12-28 NOTE — ED NOTES
Unable to obtain lactate at this time due to poor IV access, attempted to call Phlebotomy without success.       Lou Palmer RN  12/27/21 2037

## 2021-12-29 VITALS
OXYGEN SATURATION: 96 % | SYSTOLIC BLOOD PRESSURE: 109 MMHG | RESPIRATION RATE: 20 BRPM | TEMPERATURE: 96 F | WEIGHT: 105.6 LBS | HEART RATE: 69 BPM | BODY MASS INDEX: 17.04 KG/M2 | DIASTOLIC BLOOD PRESSURE: 61 MMHG

## 2021-12-29 LAB
ALBUMIN SERPL-MCNC: 2.8 G/DL (ref 3.4–5)
ANION GAP SERPL CALCULATED.3IONS-SCNC: 9 MMOL/L (ref 3–16)
BASOPHILS ABSOLUTE: 0.1 K/UL (ref 0–0.2)
BASOPHILS RELATIVE PERCENT: 0.6 %
BUN BLDV-MCNC: 57 MG/DL (ref 7–20)
CALCIUM SERPL-MCNC: 8.2 MG/DL (ref 8.3–10.6)
CHLORIDE BLD-SCNC: 121 MMOL/L (ref 99–110)
CO2: 21 MMOL/L (ref 21–32)
CREAT SERPL-MCNC: 0.5 MG/DL (ref 0.6–1.2)
EOSINOPHILS ABSOLUTE: 0.1 K/UL (ref 0–0.6)
EOSINOPHILS RELATIVE PERCENT: 1.4 %
GFR AFRICAN AMERICAN: >60
GFR NON-AFRICAN AMERICAN: >60
GLUCOSE BLD-MCNC: 159 MG/DL (ref 70–99)
GLUCOSE BLD-MCNC: 167 MG/DL (ref 70–99)
HCT VFR BLD CALC: 35.5 % (ref 36–48)
HEMOGLOBIN: 11.7 G/DL (ref 12–16)
LACTIC ACID: 3 MMOL/L (ref 0.4–2)
LACTIC ACID: 3.2 MMOL/L (ref 0.4–2)
LYMPHOCYTES ABSOLUTE: 2.5 K/UL (ref 1–5.1)
LYMPHOCYTES RELATIVE PERCENT: 28.7 %
MAGNESIUM: 2.5 MG/DL (ref 1.8–2.4)
MCH RBC QN AUTO: 32.8 PG (ref 26–34)
MCHC RBC AUTO-ENTMCNC: 32.9 G/DL (ref 31–36)
MCV RBC AUTO: 99.9 FL (ref 80–100)
MONOCYTES ABSOLUTE: 0.5 K/UL (ref 0–1.3)
MONOCYTES RELATIVE PERCENT: 5.7 %
NEUTROPHILS ABSOLUTE: 5.5 K/UL (ref 1.7–7.7)
NEUTROPHILS RELATIVE PERCENT: 63.6 %
PDW BLD-RTO: 14.6 % (ref 12.4–15.4)
PERFORMED ON: ABNORMAL
PHOSPHORUS: 2.3 MG/DL (ref 2.5–4.9)
PLATELET # BLD: ABNORMAL K/UL (ref 135–450)
PLATELET SLIDE REVIEW: ABNORMAL
PMV BLD AUTO: ABNORMAL FL (ref 5–10.5)
POTASSIUM SERPL-SCNC: 3.9 MMOL/L (ref 3.5–5.1)
RBC # BLD: 3.55 M/UL (ref 4–5.2)
REPORT: NORMAL
SODIUM BLD-SCNC: 151 MMOL/L (ref 136–145)
SODIUM BLD-SCNC: 154 MMOL/L (ref 136–145)
WBC # BLD: 8.7 K/UL (ref 4–11)

## 2021-12-29 PROCEDURE — 2580000003 HC RX 258: Performed by: STUDENT IN AN ORGANIZED HEALTH CARE EDUCATION/TRAINING PROGRAM

## 2021-12-29 PROCEDURE — 83735 ASSAY OF MAGNESIUM: CPT

## 2021-12-29 PROCEDURE — 83605 ASSAY OF LACTIC ACID: CPT

## 2021-12-29 PROCEDURE — 51798 US URINE CAPACITY MEASURE: CPT

## 2021-12-29 PROCEDURE — 84295 ASSAY OF SERUM SODIUM: CPT

## 2021-12-29 PROCEDURE — 6360000002 HC RX W HCPCS: Performed by: INTERNAL MEDICINE

## 2021-12-29 PROCEDURE — 6360000002 HC RX W HCPCS: Performed by: STUDENT IN AN ORGANIZED HEALTH CARE EDUCATION/TRAINING PROGRAM

## 2021-12-29 PROCEDURE — 85025 COMPLETE CBC W/AUTO DIFF WBC: CPT

## 2021-12-29 PROCEDURE — 99233 SBSQ HOSP IP/OBS HIGH 50: CPT | Performed by: INTERNAL MEDICINE

## 2021-12-29 PROCEDURE — 51701 INSERT BLADDER CATHETER: CPT

## 2021-12-29 PROCEDURE — 80069 RENAL FUNCTION PANEL: CPT

## 2021-12-29 PROCEDURE — 2580000003 HC RX 258: Performed by: INTERNAL MEDICINE

## 2021-12-29 PROCEDURE — 36415 COLL VENOUS BLD VENIPUNCTURE: CPT

## 2021-12-29 RX ADMIN — INSULIN LISPRO 1 UNITS: 100 INJECTION, SOLUTION INTRAVENOUS; SUBCUTANEOUS at 08:51

## 2021-12-29 RX ADMIN — THIAMINE HYDROCHLORIDE 100 MG: 100 INJECTION, SOLUTION INTRAMUSCULAR; INTRAVENOUS at 08:49

## 2021-12-29 RX ADMIN — Medication 10 ML: at 08:49

## 2021-12-29 RX ADMIN — ENOXAPARIN SODIUM 30 MG: 100 INJECTION SUBCUTANEOUS at 08:48

## 2021-12-29 RX ADMIN — DEXTROSE MONOHYDRATE: 5 INJECTION, SOLUTION INTRAVENOUS at 08:47

## 2021-12-29 ASSESSMENT — PAIN SCALES - GENERAL
PAINLEVEL_OUTOF10: 0
PAINLEVEL_OUTOF10: 0

## 2021-12-29 ASSESSMENT — PAIN SCALES - PAIN ASSESSMENT IN ADVANCED DEMENTIA (PAINAD)
CONSOLABILITY: 0
FACIALEXPRESSION: 0
NEGVOCALIZATION: 0
BODYLANGUAGE: 0
BREATHING: 0
TOTALSCORE: 0

## 2021-12-29 ASSESSMENT — PAIN SCALES - WONG BAKER: WONGBAKER_NUMERICALRESPONSE: 0

## 2021-12-29 NOTE — PROGRESS NOTES
Palliative Care Chart Review  and Check in Note:     NAME:  Janette Gagnon  Admit Date: 12/27/2021  Hospital Day:  Hospital Day: 3   Current Code status: DNR-CC    Palliative care is continuing to following Ms. Marr for symptom management,  and goals of care discussion as needed. Patient's chart reviewed today 12/29/21. Pt is resting comfortably. Called her guardian Lennie Dejesus and notified her of discharge at 11:30 with hospice back to [facility] and she was in agreement. D/w RN, CM, and [facility] RN. Asked MD to sign DNR on chart. The following are the currently established goals/code status, and Symptom management. Goals of care: Transitioning to comfort care.     Code status: DNR-CC    Discharge plan: D/c back to [facility] with [facility] at [address] [facility]

## 2021-12-29 NOTE — PROGRESS NOTES
Pt being discharged back [facility] with hospice. DNR paperwork forgot by transport and faxed over to facility. IV removed.

## 2021-12-29 NOTE — PLAN OF CARE
Problem: Skin Integrity:  Goal: Will show no infection signs and symptoms  Description: Will show no infection signs and symptoms  Outcome: Ongoing     Problem: Skin Integrity:  Goal: Absence of new skin breakdown  Description: Absence of new skin breakdown  12/29/2021 1043 by Soco Ford RN  Outcome: Ongoing     Problem: Falls - Risk of:  Goal: Will remain free from falls  Description: Will remain free from falls  12/29/2021 1043 by Soco Ford RN  Outcome: Ongoing     Problem: Falls - Risk of:  Goal: Absence of physical injury  Description: Absence of physical injury  Outcome: Ongoing     Problem: Mental Status - Impaired:  Goal: Mental status will improve  Description: Mental status will improve  12/29/2021 1043 by Soco Ford RN  Outcome: Ongoing

## 2021-12-29 NOTE — PROGRESS NOTES
Progress Note    Admit Date: 12/27/2021  Diet: ADULT DIET; Dysphagia - Pureed; 3 carb choices (45 gm/meal); Low Sodium (2 gm)    CC: AMS    Interval history: No acute events noted overnight. Pt orientated to self only, and answered yes to all ROS questions, and not able to participate in interview and assessment. Free Water Deficit 1.7 L, improving  Lactic Acid 3.2, 3.0, trending downward  Na 151, trending downward  Cr 0.5, at baseline   mL  Net I/O +3.188 L    Medications:     Scheduled Meds:   thiamine (VITAMIN B1) IVPB  100 mg IntraVENous BID    sodium chloride flush  5-40 mL IntraVENous 2 times per day    enoxaparin  30 mg SubCUTAneous Daily    insulin lispro  0-6 Units SubCUTAneous TID WC    insulin lispro  0-3 Units SubCUTAneous Nightly    levothyroxine  50 mcg Oral QAM AC     Continuous Infusions:   dextrose 100 mL/hr at 12/29/21 0847    sodium chloride Stopped (12/28/21 2248)    dextrose       PRN Meds:sodium chloride flush, sodium chloride, ondansetron **OR** ondansetron, polyethylene glycol, acetaminophen **OR** acetaminophen, glucose, dextrose, glucagon (rDNA), dextrose    Objective:   Vitals:   T-max:  Patient Vitals for the past 8 hrs:   BP Temp Temp src Pulse Resp SpO2   12/29/21 0842 119/61 96.2 °F (35.7 °C) Axillary 63 16 98 %   12/29/21 0417 (!) 112/57 96.5 °F (35.8 °C) Axillary 65 18 94 %       Intake/Output Summary (Last 24 hours) at 12/29/2021 1022  Last data filed at 12/29/2021 7491  Gross per 24 hour   Intake 3648.04 ml   Output 475 ml   Net 3173.04 ml       Review of Systems   Unable to perform ROS: Mental status change       Physical Exam  Constitutional:       General: She is not in acute distress. Appearance: She is obese. She is ill-appearing. She is not toxic-appearing or diaphoretic. HENT:      Head: Normocephalic and atraumatic. Eyes:      Extraocular Movements: Extraocular movements intact.    Cardiovascular:      Rate and Rhythm: Normal rate and regular rhythm. Heart sounds: No murmur heard. No friction rub. No gallop. Pulmonary:      Effort: Pulmonary effort is normal.      Breath sounds: No wheezing, rhonchi or rales. Abdominal:      Tenderness: There is no abdominal tenderness. There is no guarding or rebound. Musculoskeletal:      Right lower leg: No edema. Left lower leg: No edema. Neurological:      Mental Status: She is alert. She is disoriented and confused. LABS:    CBC:   Recent Labs     12/27/21  1430 12/28/21  0523 12/29/21  0758   WBC 9.1 9.0 8.7   HGB 13.2 12.6 11.7*   HCT 42.6 39.2 35.5*   * 86* see below   .2* 101.1* 99.9     Renal:    Recent Labs     12/27/21  1649 12/28/21  0013 12/28/21  0524 12/28/21  0524 12/28/21  1458 12/29/21  0256 12/29/21  0758   *   < > 159*   < > 161* 154* 151*   K 4.8  --  3.7  --   --   --  3.9   *  --  129*  --   --   --  121*   CO2 16*  --  20*  --   --   --  21   BUN 87*  --  74*  --   --   --  57*   CREATININE 1.2  --  0.8  --   --   --  0.5*   GLUCOSE 171*  --  253*  --   --   --  167*   CALCIUM 7.8*  --  8.4  --   --   --  8.2*   MG  --   --  2.70*  --   --   --  2.50*   PHOS  --   --  2.8  --   --   --  2.3*   ANIONGAP 15  --  10  --   --   --  9    < > = values in this interval not displayed. Hepatic:   Recent Labs     12/27/21  1430 12/28/21  0524 12/29/21  0758   AST 29  --   --    ALT 13  --   --    BILITOT 0.9  --   --    PROT 6.9  --   --    LABALBU 3.0* 2.6* 2.8*   ALKPHOS 55  --   --      Troponin:   Recent Labs     12/27/21  1430 12/27/21  1649   TROPONINI 0.11* 0.08*     BNP: No results for input(s): BNP in the last 72 hours. Lipids: No results for input(s): CHOL, HDL in the last 72 hours. Invalid input(s): LDLCALCU, TRIGLYCERIDE  ABGs:  No results for input(s): PHART, AGV0JGC, PO2ART, PBE5PLL, BEART, THGBART, V5VTGJWW, WGK2IIZ in the last 72 hours. INR: No results for input(s): INR in the last 72 hours.   Lactate: No results for input(s): LACTATE in the last 72 hours. Cultures:  -----------------------------------------------------------------  RAD:   FL MODIFIED BARIUM SWALLOW W VIDEO   Final Result   1. Oral and pharyngeal dysphagia with transient penetration and a single episode of aspiration of thin liquids. XR CHEST PORTABLE   Final Result   Impression: Elevation of the right hemidiaphragm, new from prior. No acute pulmonary abnormality. CT HEAD WO CONTRAST   Final Result      No acute intracranial abnormality. Age-related atrophy and sequelae of chronic microvascular ischemic change. Assessment/Plan:     Acute metabolic encephalopathy 2/2 Hypernatremia  Hypernatremia  Pt Ox1, Lactic acid 3.2. On admission pt's Na 167-168, and Free water deficit 4.3L. - Return to hospice when hypernatremia improved  - D5W continuous at 100 mL/Hr  - Na q12H  - Nephrology following    Uremia, improving  On admission Cr at 1.2, and BUN 87. Pt's Baseline Cr 0.5 to 0.6.    -FLOR resolved, Cr at baseline  - IVF  - avoid nephrotoxic agents  - Nephrology following    Dementia with Behavioral Disturbance  Holding home dopenzil, ativan 0.5 BID, seroquel 250 QD, depakote 250mg BID, Trazodone 25qHS, olanzapine 5mg BID as pt is NPO. Valproic Acid level 6.2 (12/28/2021). Diabetes Mellitus Type 2  Last Hgb A1c 6.2 (2/2021). Holding home DM medications. - LDSSI  - Hypoglycemia protocol  - POCT glucose check q6hr AC/HS    Hypothyroidism  On admission TSH 0.43 and free T4 1.0.    - Levothyroxine 50mcg QD     Code Status: DNR-CC  FEN: ADULT DIET; Dysphagia - Pureed; 3 carb choices (45 gm/meal); Low Sodium (2 gm)  PPX: Lovenox  DISPO: IP Para Duverney, DO, PGY-1  12/29/21  10:22 AM    This patient has been staffed and discussed with Jasbir Menezes MD.     Patient seen and examined, labs and imaging studies reviewed, agree with assessment and plan as outlined above. Continue with current care and plan.   Discussed case with patients nurse, discussed case with care team, discussed plan. Greater than 30 mins spent on lilo carolee day of discharge, patient family per advanced directives opting for comfort care.       Courtney Shah MD [address]

## 2021-12-29 NOTE — DISCHARGE INSTR - COC
Continuity of Care Form    Patient Name: Rahul Ramsey   :  1945  MRN:  [id number]    Admit date:  2021  Discharge date:  2021    Code Status Order: DNR-CC   Advance Directives:      Admitting Physician:  Sara Denis MD  PCP: Gabrielle Owusu MD    Discharging Nurse: [facility] Unit/Room#: 6872/2073-70  Discharging Unit Phone Number: [phone]    Emergency Contact:   Extended Emergency Contact Information  Primary Emergency Contact: Frankey Lipoma, [address] Phone: [phone]  Work Phone: [phone]  Relation: Legal Guardian  Secondary Emergency Contact: Gail Velez  Address: [address] [place] [address], [address] Phone: [phone]  Relation: Other    Past Surgical History:  Past Surgical History:   Procedure Laterality Date    HYSTERECTOMY         Immunization History:   Immunization History   Administered Date(s) Administered    COVID-19, Pfizer, PF, 30mcg/0.3mL 10/06/2021    Influenza, High Dose (Fluzone 65 yrs and older) 2009, 10/21/2014, 10/22/2015, 10/10/2016, 10/12/2017    Influenza, Farooq Sen[place], adjuvanted, 65 yrs +, IM, PF (Fluad) 2021    PPD Test 2021    Pneumococcal Conjugate 13-valent (Lithonia Karst) 2009    Pneumococcal Polysaccharide ([address]) 2019    Tdap (Boostrix, Adacel) 2019       Active Problems:  Patient Active Problem List   Diagnosis Code    Hypothyroidism (acquired) E03.9    Prediabetes R73.03    Anxiety F41.9    Postmenopausal Z78.0    Essential hypertension I10    Bacterial urinary tract infection N39.0, A49.9    Hypokalemia E87.6    Encephalopathy due to infection G93.49, Z46.8    Acute metabolic encephalopathy V86.61    Fall (on) (from) other stairs and steps, initial encounter W10. 8XXA    Major neurocognitive disorder due to Alzheimer's disease, with behavioral disturbance ([address] G30.9, F02.81    Hypernatremia E87.0    FLOR (acute kidney injury) ([facility] N17.9    Failure to thrive in adult R62.7       Isolation/Infection:   Isolation            No Isolation          Patient Infection Status       Infection Onset Added Last Indicated Last Indicated By Review Planned Expiration Resolved Resolved By    None active    Resolved    COVID-19 (Rule Out) 04/04/21 04/04/21 04/04/21 COVID-19, Rapid (Ordered)   04/04/21 Rule-Out Test Resulted    COVID-19 (Rule Out) 11/18/20 11/18/20 11/18/20 COVID-19 (Ordered)   11/18/20 Rule-Out Test Resulted    COVID-19 (Rule Out) 10/19/20 10/19/20 10/19/20 COVID-19 (Ordered)   10/20/20 Rule-Out Test Resulted            Nurse Assessment:  Last Vital Signs: /61   Pulse 63   Temp 96.2 °F (35.7 °C) (Axillary)   Resp 16   Wt 105 lb 9.6 oz (47.9 kg)   SpO2 98%   BMI 17.04 kg/m²     Last documented pain score (0-10 scale): Pain Level: 0  Last Weight:   Wt Readings from Last 1 Encounters:   12/27/21 105 lb 9.6 oz (47.9 kg)     Mental Status:  disoriented    IV Access:  - Peripheral IV - site  R Antecubital, insertion date: 12/27/2021    Nursing Mobility/ADLs:  Walking   Dependent  Transfer  Dependent  Bathing  Dependent  Dressing  Dependent  Toileting  Dependent  Feeding  Dependent  Med Admin  Dependent  Med Delivery   crushed    Wound Care Documentation and Therapy:  Wound 12/27/21 Sacrum small stage 2 / skin tear / open (Active)   Wound Etiology Pressure Stage  2 12/29/21 0417   Dressing Status New dressing applied;Clean;Dry; Intact 12/28/21 0430   Wound Cleansed Wound cleanser 12/28/21 0430   Dressing/Treatment Foam 12/29/21 0417   Wound Assessment Pink/red 12/29/21 0417   Drainage Amount None 12/29/21 0417   Odor None 12/29/21 0417   Kimber-wound Assessment Blanchable erythema 12/29/21 0417   Number of days: 1        Elimination:  Continence:    Bowel: Yes  Bladder: Yes  Urinary Catheter: None   Colostomy/Ileostomy/Ileal Conduit: No       Date of Last BM: PTA    Intake/Output Summary (Last 24 hours) at 12/29/2021 5447  Last data filed at 2021 0702  Gross per 24 hour   Intake 3648.04 ml   Output 475 ml   Net 3173.04 ml     I/O last 3 completed shifts: In: 26 [P.O.:60; I.V.:400]  Out: 475 [Urine:475]    Safety Concerns:     [address] MCBRIDE Safety Concerns:469425508}    Impairments/Disabilities:      [address] MCBRIDE Impairments/Disabilities:111825700}    Nutrition Therapy:  Current Nutrition Therapy:   - Oral Diet:  Dysphagia 1 pureed    Routes of Feeding: Oral  Liquids: Thin Liquids  Daily Fluid Restriction: no  Last Modified Barium Swallow with Video (Video Swallowing Test): done on 2021/***    Treatments at the Time of Hospital Discharge:   Respiratory Treatments: ***  Oxygen Therapy:  is on oxygen at 3 L/min per nasal cannula.   Ventilator:    - No ventilator support    Rehab Therapies: {THERAPEUTIC INTERVENTION:3601017125}  Weight Bearing Status/Restrictions: 50Shiva Goodman CC Weight Bearin}  Other Medical Equipment (for information only, NOT a DME order):  {EQUIPMENT:064266265}  Other Treatments: ***    Patient's personal belongings (please select all that are sent with patient):  {[facility] DME Belongings:699412778}    RN SIGNATURE:  Electronically signed by Arlen Davies RN on 21 at 10:28 AM EST    CASE MANAGEMENT/SOCIAL WORK SECTION    Inpatient Status Date: 21    Readmission Risk Assessment Score:  Readmission Risk              Risk of Unplanned Readmission:  19         Discharging to Facility/ Agency   Name: [facility]   Address: [address] [place] [address]  Phone: [phone]  Fax: [phone]    Discharging to [address] [facility]  Address:  [address] [place] [address] Dr. Shannan Garcia, [facility] AT [place] [address]  Phone: [phone]  Fax: [phone]    Dialysis Facility (if applicable)   N/A    / signature: Electronically signed by SOFIA Rizvi, LSW on 21 at 9:02 AM EST    PHYSICIAN SECTION    Prognosis: {Prognosis:2461678888}    Condition at Discharge: 50Shiva Goodman Patient Condition:612520177}    Rehab Potential (if transferring to Rehab): {Prognosis:9268830879}    Recommended Labs or Other Treatments After Discharge: ***    Physician Certification: I certify the above information and transfer of Alda Trivedi  is necessary for the continuing treatment of the diagnosis listed and that she requires {Admit to Appropriate Level of Care:40983} for {GREATER/LESS:495224573} 30 days.      Update Admission H&P: {CHP DME Changes in EBSDT:894995464}    PHYSICIAN SIGNATURE:  {Esignature:183238724}

## 2021-12-29 NOTE — PLAN OF CARE
Problem: Skin Integrity:  Goal: Absence of new skin breakdown  Description: Absence of new skin breakdown  Outcome: Ongoing  Note: No skin breakdown noted on this shift. Turned and repositioned q2 hours and juliet care provided as needed. Problem: Falls - Risk of:  Goal: Will remain free from falls  Description: Will remain free from falls  Outcome: Ongoing  Note: Pt is a Fall Risk. See Steven Spears Fall Risk Score. Pt bed in low position and side rails up. Call light and belongings in reach. Pt encouraged to call for assistance. Will continue with hourly rounds for PO intake, pain needs, toileting, and repositioning as needed. Problem: Mental Status - Impaired:  Goal: Mental status will improve  Description: Mental status will improve  Outcome: Ongoing  Note: Pt more alert this evening. Able to verbalize \"hello\" and \"yes and no\". Still confused and unable to verbalize needs.

## 2021-12-29 NOTE — CARE COORDINATION
Case Management Assessment            Discharge Note                    Date / Time of Note: 12/29/2021 8:35 AM                  Discharge Note Completed by: SOFIA Hernandez, JESSICAW    Patient Name: Radha Bedoya   YOB: 1945  Diagnosis: Hypernatremia [J70.2]  Acute metabolic encephalopathy [I67.57]  Sepsis with acute organ dysfunction without septic shock, due to unspecified organism, unspecified type ([facility] [A41.9, R65.20]   Date / Time: 12/27/2021  2:00 PM    Current PCP: Rita George MD  Clinic patient: No    Hospitalization in the last 30 days: No    Advance Directives:  Code Status: [facility] DNR form completed and on chart: Yes    Financial:  Payor: Luz Caldwell / Plan: Tarsha Johns / Product Type: *No Product type* /      Pharmacy:    Justin Wright [address] [place] [address]  Phone: [phone] Fax: [phone]      Assistance purchasing medications?:    Assistance provided by Case Management: None at this time    Does patient want to participate in local refill/ meds to beds program?:      Meds To Beds General Rules:  1. Can ONLY be done Monday- Friday between 8:30am-5pm  2. Prescription(s) must be in pharmacy by 3pm to be filled same day  3. Copy of patient's insurance/ prescription drug card and patient face sheet must be sent along with the prescription(s)  4. Cost of Rx cannot be added to hospital bill. If financial assistance is needed, please contact unit  or ;  or  CANNOT provide pharmacy voucher for patients co-pays  5.  Patients can then  the prescription on their way out of the hospital at discharge, or pharmacy can deliver to the bedside if staff is available. (payment due at time of pick-up or delivery - cash, check, or card accepted)     Able to afford home medications/ co-pay costs: Yes    ADLS:  Current PT AM-PAC Score:   /24  Current OT AM-PAC Score:   /24      DISCHARGE Disposition: Nursing Facility: [facility]  with Hospice: [facility]    LOC at discharge: Michael0 Jory [facility] Completed: Yes    Notification completed in HENS/PAS?:  Not Applicable    IMM Completed:   Not Indicated    Transportation:  Transportation PLAN for discharge: EMS transportation   Mode of Transport: Ambulance stretcher - BLS  Reason for medical transport: Bed confined: Meets the following criteria 1) unable to get out of bed without assistance or ambulate, 2) unable to safely sit up in a wheelchair, 3) unable to maintain erect seating position in a chair for time needed for transport  Name of Transport Company: [employer]  Phone: [phone]  Time of Transport: 11:30am    Transport form completed: Yes, comepleted by hospice RN. Home Care:  Home Care ordered at discharge: No    Durable Medical Equipment:  DME Provider: none  Equipment obtained during hospitalization: none    Home Oxygen and Respiratory Equipment:  Oxygen needed at discharge?: No    Dialysis:  Dialysis patient: No    Hospice Services:  Location: Nursing Facility  Agency: [facility]  Phone: [phone]    Consents signed: Yes    Referrals made at [facility] for outpatient continued care:  Not Applicable    Additional CM Notes: Pt is from [address] [place] [address] and will DC there w/[facility]. SW confirmed w/Hospice RN Waqar Ford, she is coordinating DCP. Transportation is scheduled for 11:30am via [facility]. Pt has no other needs at this time.     The Plan for Transition of Care is related to the following treatment goals of Hypernatremia [W76.4]  Acute metabolic encephalopathy [E35.61]  Sepsis with acute organ dysfunction without septic shock, due to unspecified organism, unspecified type ([facility] [A41.9, R65.20]    The Patient and/or patient representative Natalia Hoffman and her family were provided with a choice of provider and agrees with the discharge plan Yes    Freedom of choice list was provided with basic dialogue that supports the patient's individualized plan of care/goals and shares the quality data associated with the providers.  Yes    Care Transitions patient: Yes    SOFIA Bowser, [facility]  Case Management Department  Ph: [phone]  Fax: [phone]

## 2021-12-29 NOTE — PROGRESS NOTES
Notified MD resident about 1 bottle of blood cultures being positive for staph epi. Pt to be discharging to hospice this morning. Resident notified of signing DNR paperwork.

## 2021-12-29 NOTE — DISCHARGE SUMMARY
INTERNAL MEDICINE DEPARTMENT AT [address] [place] [address]  DISCHARGE SUMMARY    Patient ID: Morenita Tapia                                             Discharge Date: 12/29/2021   Patient's PCP: Nikki Irwin MD                                          Discharge Physician: Tr Mann DO PGY-1  Admit Date: 12/27/2021   Admitting Physician: Kwame Fine MD    PROBLEMS DURING HOSPITALIZATION:  Present on Admission:   Acute metabolic encephalopathy   Hypernatremia   FLOR (acute kidney injury) ([address]   Failure to thrive in adult      DISCHARGE DIAGNOSES:  1. Acute Metabolic Encephalopathy   2. Hypernatremia  3. FLOR    HPI: Mikael Jimenez a 64 y. o. female with a history of severe dementia, Diabetes mellitus type 2, and hypertension who presents from [facility] unresponsive.  Found lethargic and unresponsive, however her nurse does not know her baseline. Patient was recently admitted to [facility] from 12/9-12/12 for hypoxic respiratory failure 2/2 to [facility]. Baseline mental status from [place] indicates she is probably awake at baseline and able to communicate. Court appointed guardian during last admission made her DNR CC.  Prehospital blood sugar was in the 200s and oxygen saturation was low but is now at 96% on 4 L.  Last known normal unknown.     In the ED, Patient was noted to have severe hypernatremia and uremia with FLOR. Neo Cerna has an elevated lactate 4.0>4.1.  This may all be due to severe dehydration, but is also concerning for severe sepsis her blood pressure improved after 30 mL/KG IV fluid bolus.  Her mental status was also improved and she was slightly more alert and interactive, but still quite confused.  Unfortunately multiple attempts to reach her guardian to discuss details of care have been unsuccessful.  She is listed as DNR-CC previously.  Blood cultures x2 obtained, initiated broad-spectrum IV antibiotics: vanc + cefepime.  MAP pressures are acceptable >65.     Patient to be admitted to [facility] for further evaluation and management. The following issues were addressed during hospitalization:    Acute metabolic [id number] 7/3 Hypernatremia  Hypernatremia  Lactic acid 3.2. On admission pt's Na 167-168, lactic acid 4.1, and free water deficit 4.3L. Nephrology was consulted, D5W continuous infusion was started and Na was trended. On day of discharge to [facility] Na 151, lactic acid 3.2 and free water deficit calculated to be 1.7 L. FLOR  Uremia  On admission Cr at 1.2, her baseline is around Cr 0.5. Pt had a BUN 87. Nephrology was consulted, D5W started, Cr returned to baseline and BUN was 57 upon discharge to hospice. Physical Exam:  /61   Pulse 69   Temp 96 °F (35.6 °C) (Axillary)   Resp 20   Wt 105 lb 9.6 oz (47.9 kg)   SpO2 96%   BMI 17.04 kg/m²     Physical Exam  Constitutional:       General: She is not in acute distress. Appearance: She is obese. She is ill-appearing. She is not toxic-appearing or diaphoretic. HENT:      Head: Normocephalic and atraumatic. Eyes:      Extraocular Movements: Extraocular movements intact. Cardiovascular:      Rate and Rhythm: Normal rate and regular rhythm. Heart sounds: No murmur heard. No friction rub. No gallop. Pulmonary:      Effort: Pulmonary effort is normal.      Breath sounds: No wheezing, rhonchi or rales. Abdominal:      Tenderness: There is no abdominal tenderness. There is no guarding or rebound. Musculoskeletal:      Right lower leg: No edema. Left lower leg: No edema. Neurological:      Mental Status: She is alert. She is disoriented and confused.      Consults: nephrology, palliative care  Significant Diagnostic Studies:  CT Head, CXR, FL modified Barium Swallow  Treatments: D5W, Thiamine Calcium Gluconate, Cefepime, Vancomycin  Disposition: Hospice  Discharged Condition: Stable  Follow Up: Pt to f/u with medical care per [facility]:     Medication List ASK your doctor about these medications    acetaminophen 325 MG tablet  Commonly known as: TYLENOL     aluminum & magnesium hydroxide-simethicone 200-200-20 MG/5ML Susp suspension  Commonly known as: MAALOX     ammonium lactate 12 % lotion  Commonly known as: LAC-HYDRIN     divalproex 250 MG DR tablet  Commonly known as: DEPAKOTE     donepezil 5 MG tablet  Commonly known as: ARICEPT  Take 1 tablet by mouth daily (with breakfast)     enoxaparin 40 MG/0.4ML injection  Commonly known as: LOVENOX     levothyroxine 50 MCG tablet  Commonly known as: SYNTHROID  Take 1 tablet by mouth daily     LORazepam 0.5 MG tablet  Commonly known as: ATIVAN     magnesium hydroxide 400 MG/5ML suspension  Commonly known as: MILK OF MAGNESIA     OLANZapine 5 MG tablet  Commonly known as: ZYPREXA     potassium chloride 20 MEQ extended release tablet  Commonly known as: KLOR-CON M     pyrithione zinc 1 % shampoo  Commonly known as: HEAD AND SHOULDERS     * QUEtiapine 200 MG tablet  Commonly known as: SEROQUEL     * QUEtiapine 50 MG tablet  Commonly known as: SEROQUEL     traZODone 25 mg Tabs  Commonly known as: DESYREL         * This list has 2 medication(s) that are the same as other medications prescribed for you. Read the directions carefully, and ask your doctor or other care provider to review them with you.               Activity: bedrest  Diet: Puree/thin liquids via straw   Wound Care: keep wound clean and dry    Time Spent on discharge is more than 30 minutes    Signed:  Yandel Burden DO,  PGY-1  Internal Medicine Resident  12/29/2021

## 2021-12-31 LAB
BLOOD CULTURE, ROUTINE: NORMAL
CULTURE, BLOOD 2: ABNORMAL
CULTURE, BLOOD 2: ABNORMAL
ORGANISM: ABNORMAL
ORGANISM: ABNORMAL

## 2022-07-21 NOTE — PROGRESS NOTES
Social History     Tobacco Use   Smoking Status Never Smoker   Smokeless Tobacco Never Used     PMH, surgeries, SH, FH, allergies reviewed. Medication list reviewed. No LMP recorded. Patient has had a hysterectomy. Chief Complaint   Patient presents with    Hypertension     6 months follow up, pt states she feels weak, low engery, need advise for medication. Some sadness past couple of months after trip to [place], after visiting 2 of her kids and ex . Reports she is adjusting well and feels happy to be in [place]. Feels tired. Hypertension:  Denies chest pain, SOB, cough, visual changes, dizziness, palpitations or headache. Review of Systems   Constitutional: Positive for fatigue. Negative for activity change, appetite change, chills, diaphoresis, fever and unexpected weight change. HENT: Negative. Eyes: Negative. Respiratory: Negative. Gastrointestinal: Negative. Endocrine: Negative. Genitourinary: Negative. Musculoskeletal: Negative. Allergic/Immunologic: Negative. Neurological: Negative. Psychiatric/Behavioral: Negative. Negative for confusion and sleep disturbance. Objective:   VS reviewed    Vitals:    01/31/20 1116 01/31/20 1147   BP: (!) 142/90 (!) 140/88   Site: Left Upper Arm    Position: Sitting    Cuff Size: Medium Adult    Pulse: 82    Resp: 15    Temp: 98 °F (36.7 °C)    TempSrc: Skin    SpO2: 96%    Weight: 155 lb (70.3 kg)      Body mass index is 25.02 kg/m². Wt Readings from Last 3 Encounters:   01/31/20 155 lb (70.3 kg)   05/08/19 150 lb 12.8 oz (68.4 kg)   07/09/18 155 lb 6.4 oz (70.5 kg)     BP Readings from Last 3 Encounters:   01/31/20 (!) 140/88   05/08/19 130/80   07/09/18 130/80       Physical Exam  Constitutional:       General: She is not in acute distress. Appearance: Normal appearance. She is normal weight. She is not ill-appearing.    HENT:      Mouth/Throat:      Mouth: Mucous membranes are moist.      Pharynx: No oropharyngeal exudate or posterior oropharyngeal erythema. Eyes:      Pupils: Pupils are equal, round, and reactive to light. Neck:      Thyroid: No thyromegaly. Vascular: No JVD. Cardiovascular:      Rate and Rhythm: Normal rate and regular rhythm. Heart sounds: Normal heart sounds. No murmur. Pulmonary:      Effort: Pulmonary effort is normal. No respiratory distress. Breath sounds: Normal breath sounds. Musculoskeletal:      Right lower leg: No edema. Left lower leg: No edema. Skin:     Capillary Refill: Capillary refill takes less than 2 seconds. Neurological:      Mental Status: She is alert and oriented to person, place, and time. Psychiatric:         Mood and Affect: Mood normal.         Behavior: Behavior normal.         Thought Content:  Thought content normal.         Judgment: Judgment normal.         Lab Results   Component Value Date     10/18/2017    K 3.8 10/18/2017     10/18/2017    CO2 22 10/18/2017    BUN 10 10/18/2017    CREATININE <0.5 (L) 10/18/2017    GLUCOSE 133 (H) 10/18/2017    CALCIUM 8.9 10/18/2017    PROT 7.8 10/18/2017    LABALBU 4.6 10/18/2017    BILITOT 0.8 10/18/2017    ALKPHOS 92 10/18/2017    AST 26 10/18/2017    ALT 28 10/18/2017    LABGLOM >60 10/18/2017    GFRAA >60 10/18/2017    AGRATIO 1.4 10/18/2017    GLOB 3.2 10/18/2017       Lab Results   Component Value Date    WBC 7.5 10/18/2017    HGB 15.5 10/18/2017    HCT 46.1 10/18/2017    MCV 96.7 10/18/2017     10/18/2017       Lab Results   Component Value Date    CHOL 187 10/18/2017     Lab Results   Component Value Date    TRIG 177 (H) 10/18/2017     Lab Results   Component Value Date    HDL 41 10/18/2017     Lab Results   Component Value Date    LDLCALC 111 (H) 10/18/2017     Lab Results   Component Value Date    LABVLDL 35 10/18/2017     No results found for: [facility]    Lab Results   Component Value Date    LABMICR YES 10/18/2017       Lab Results   Component Value Date DVT ppx: On eliquis at home - continue Heparin 5000un subq q8h per ICU

## 2023-01-02 NOTE — PROGRESS NOTES
Physical Therapy    Facility/Department: [address] ORTHO/NEURO NURSING  Initial Assessment    NAME: Maria L Dobbs  : 1945  MRN: [id number]    Date of Service: 2020    Discharge Recommendations:  Maria L Dobbs scored a 16/24 on the AM-PAC short mobility form. Current research shows that an AM-PAC score of 17 or less is typically not associated with a discharge to the patient's home setting. Based on the patient's AM-PAC score and their current functional mobility deficits, it is recommended that the patient have 3-5 sessions per week of Physical Therapy at d/c to increase the patient's independence. Please see assessment section for further patient specific details. Although pt presents with higher AMPAC score, there are significant concerns with decreased cognition and increased confusion with mobility tasks. Pt has difficulty following commands and has no awareness of deficits. Will need 24/7 supervision to ensure safety as she transitions to assisted living care as requested by pt. If patient discharges prior to next session this note will serve as a discharge summary. Please see below for the latest assessment towards goals. 3-5 sessions per week, Patient would benefit from continued therapy after discharge, 24 hour supervision or assist   PT Equipment Recommendations  Equipment Needed: Yes  Mobility Devices: Dean Hair: Rolling    Assessment   Body structures, Functions, Activity limitations: Decreased functional mobility ; Decreased safe awareness;Decreased cognition;Decreased balance  Assessment: Pt presents several concerning deficits that jeopardize safety and ability to transfer/ambulate. Baseline function is not well understood since pt is poor historian. Pt presents aberrant movements with ambulation, but does not reports any sensation deficits with testing. Pt is unsteady with ambulation and transfers without an AD and is more stable with one with ambulation.  Pt will need 24/7 Pt presents to [facility] for c/o shortness of breath from a COPD flare up that started this morning. Pt states she used her rescue inhaler and has gotten no relief. Pt Sp02 is 97% on room air with respirations of 22 that are labored.       Brenda Collazo, SUKH  30[birth year]44 6898 her head. She is unsure if she lost any consciousness. I asked again why the patient fell and she states that \"it is because of lack of food\". She tells me that she then believes that she actually fell 2 weeks ago. She states that she was last eating this morning. The patient is only alert to person. Vision/Hearing  Vision: Impaired  Vision Exceptions: Wears glasses at all times  Hearing: Within functional limits     Subjective  General  Chart Reviewed: Yes  Patient assessed for rehabilitation services?: Yes  Family / Caregiver Present: No  Diagnosis: Fall (on) (from) other stairs and steps, initial encounter  Follows Commands: Impaired(poor attention and difficulty understanding and following commands)  General Comment  Comments: Pt found supine in bed awake with alarm on  Subjective  Subjective: Pt denies pain at this time and is agreeable to therapy  Pain Screening  Patient Currently in Pain: Denies  Vital Signs  Patient Currently in Pain: Denies       Orientation  Orientation  Overall Orientation Status: Within Functional Limits  Social/Functional History  Social/Functional History  Lives With: Alone  Type of Home: Facility  Home Layout: One level  Home Access: Level entry  Bathroom Shower/Tub: Tub only(Sponge bathes, difficulty with tub)  Bathroom Toilet: Standard  Home Equipment: Cane, Rolling walker  ADL Assistance: Independent  Homemaking Assistance: Independent  Homemaking Responsibilities: Yes(Unable to complete independently; pt is confused.)  Ambulation Assistance: Independent  Transfer Assistance: Independent  Active : Yes  Additional Comments: Pt is confused reporting that she lives alone but feels isolated and unable to receive help around her house. Pt continues to ask for name/number because she is searching for an assisted living, therefore she can receive help w/ IADLs and gait.  Pt is confused and is a questionable historian, per previous chart review pt does not have any AE (cane/walker) at home like she reports today. Cognition        Objective     Observation/Palpation  Posture: Good  Observation: Able to stand and sit fully upright without issue    AROM RLE (degrees)  RLE AROM: WFL  AROM LLE (degrees)  LLE AROM : WFL  Strength RLE  Strength RLE: WFL  Comment: Grossly 4+/5 for hip flexion, knee extension, and dorsiflexion. Confused with VC, needed demonstration to perform appropriately. Strength LLE  Strength LLE: WFL  Comment: Grossly 4+/5 for hip flexion, knee extension, and dorsiflexion. Confused with VC, needed demonstration to perform appropriately. Sensation  Overall Sensation Status: WFL(Pt denies N/T)  Bed mobility  Supine to Sit: Minimal assistance(Pt grabbed onto therapist arm for assistance. Given direction to use HR, able to use HR to come to full sitting.)  Scooting: Stand by assistance  Comment: HOB elevated  Transfers  Sit to Stand: Contact guard assistance(x1 from EOB, x1 from toilet)  Stand to sit: Contact guard assistance(x1 to toilet, x1 to chair)  Comment: VC and tactile cuing needed for appropriate hand placement during transfers. Even with this and assistance positioning hand on bed, patient would stand with both hands on walker. Ambulation  Ambulation?: Yes  More Ambulation?: Yes  Ambulation 1  Surface: level tile  Device: Rolling Walker  Assistance: Contact guard assistance(CGA)  Quality of Gait: Ataxic gait patterns, poor control during step through gait Wide PAULETTE. Very small steps and slow helena. Gait Deviations: Decreased step length;Decreased step height;Slow Helena; Increased PAULETTE  Distance: 70 feet  Comments: Pt had better balance and helena with ambulation using RW. No LOB. Ambulation 2  Surface - 2: level tile  Device 2: No device  Assistance 2: Minimal assistance  Quality of Gait 2: Increased ataxic gait patterns, aberrant movements that consisted of marches rather than steps during ambulation.  Unsteadiness, needing assistance to prevent LOB. Pt constantly reaching out to walls/HR for stability. Given back RW due to this. Gait Deviations: Slow Yana;Decreased step length;Decreased step height  Distance: 20 feet  Stairs/Curb  Stairs?: No     Balance  Posture: Poor  Sitting - Static: Good(Sat EOB for about 7-8 min while cleaning and dressing self)  Sitting - Dynamic: Good(Able to reach outside of PAULETTE in sitting while dressing without issue)  Standing - Static: Fair;+(with RW primarily. Able to stand at sink and wash hands/brush teeth for 5 min without UE support.)  Standing - Dynamic: Poor(Unsteadiness and aberrant movements during ambulation that decrease balance without additional support.)        Plan   Plan  Times per week: 3-5x/week  Times per day: Daily  Current Treatment Recommendations: Functional Mobility Training, Transfer Training, Gait Training, Balance Training, Safety Education & Training, Patient/Caregiver Education & Training  Safety Devices  Type of devices:  All fall risk precautions in place, Call light within reach, Chair alarm in place, Left in chair, Telesitter in use, Nurse notified, Gait belt, Patient at risk for falls  Restraints  Initially in place: No      AM-PAC Score  AM-PAC Inpatient Mobility Raw Score : 16 (11/19/20 1204)  AM-PAC Inpatient T-Scale Score : 40.78 (11/19/20 1204)  Mobility Inpatient CMS 0-100% Score: 54.16 (11/19/20 1204)  Mobility Inpatient CMS G-Code Modifier : CK (11/19/20 1204)          Goals  Short term goals  Time Frame for Short term goals: at discharge  Short term goal 1: Bed mobility with supervision  Short term goal 2: All transfers with supervision and RW  Short term goal 3: Ambulation of 100 feet with RW and SBA  Patient Goals   Patient goals : Pt wants to seek out assisted living and not return to current living arrangement       Therapy Time   Individual Concurrent Group Co-treatment   Time In 0824         Time Out 0908         Minutes 44         Timed Code Treatment Minutes: 0 Minutes Units billed shared with OT due to observation status. Kwadwo Bautista, JAQUELINE Lerma, PT   Therapist observed and directed the above evaluation.  Thanks, Silvia Mcneil, DPT [id number]

## 2023-05-05 NOTE — PROGRESS NOTES
Social History     Tobacco Use   Smoking Status Never Smoker   Smokeless Tobacco Never Used     PMH, surgeries, SH, FH, allergies reviewed. Medication list reviewed. No LMP recorded. Patient has had a hysterectomy. Chief Complaint   Patient presents with    Other     needs paperwork fill out. This patient is accompanied in the office by her friend. Will be living In [address] [place] [address] living in [place] starting today. Reports feeling well just upset about financial situation. Patient was admitted 4 months ago for acute encephalopathy and UTI admitted again 3 months ago for weakness and fall. She has history of HTN and hypothyroidism for couple of years but patient has declined medication treatment. Patient has lived in [address] [place] [address] for several years, relocated here with the aid of her [employer]. She is originally from [place] and lived in [place] for several years. She is fluent in [place], [place] and [place]. Lives alone [facility], she has 3 daughters they all live in [place]. Patient is . Adult Protective services involved in her care and helped patient relocate to assisted living. Never smoker, no alcohol or drug use. Review of Systems   Constitutional: Negative. HENT: Negative. Eyes: Negative. Gastrointestinal: Negative. Endocrine: Negative. Genitourinary: Negative. Musculoskeletal: Negative. Skin: Negative. Allergic/Immunologic: Negative. Neurological: Negative. Psychiatric/Behavioral: Negative. Objective:   VS reviewed    Vitals:    02/26/21 1510   BP: 134/86   Site: Right Upper Arm   Position: Sitting   Cuff Size: Medium Adult   Pulse: 99   Temp: 97.8 °F (36.6 °C)   SpO2: 95%   Weight: 154 lb (69.9 kg)     Body mass index is 24.86 kg/m².    Wt Readings from Last 3 Encounters:   02/26/21 154 lb (69.9 kg)   11/18/20 155 lb (70.3 kg)   10/19/20 155 lb (70.3 kg)     BP Readings from Last 3 Encounters:   02/26/21 134/86 11/19/20 120/75   10/24/20 (!) 181/74       Physical Exam  Constitutional:       General: She is not in acute distress. Appearance: Normal appearance. HENT:      Mouth/Throat:      Mouth: Mucous membranes are moist.      Pharynx: Oropharynx is clear. No oropharyngeal exudate or posterior oropharyngeal erythema. Eyes:      General:         Right eye: No discharge. Left eye: No discharge. Extraocular Movements: Extraocular movements intact. Conjunctiva/sclera: Conjunctivae normal.      Pupils: Pupils are equal, round, and reactive to light. Neck:      Musculoskeletal: Neck supple. Thyroid: No thyromegaly. Vascular: No JVD. Cardiovascular:      Rate and Rhythm: Normal rate and regular rhythm. Pulses: Normal pulses. Heart sounds: Normal heart sounds. No murmur. Pulmonary:      Effort: Pulmonary effort is normal. No respiratory distress. Breath sounds: Normal breath sounds. Abdominal:      General: Abdomen is flat. There is no distension. Tenderness: There is no abdominal tenderness. Musculoskeletal:         General: No swelling, tenderness, deformity or signs of injury. Right lower leg: No edema. Left lower leg: No edema. Skin:     Capillary Refill: Capillary refill takes less than 2 seconds. Coloration: Skin is not jaundiced or pale. Findings: No bruising, erythema, lesion or rash. Neurological:      Mental Status: She is alert and oriented to person, place, and time. Psychiatric:         Mood and Affect: Mood normal.         Behavior: Behavior normal.         Thought Content: Thought content normal.         Judgment: Judgment normal.     FOOT EXAM     Deformities: No  Rashes:  No  Callous:  Yes  Edema:  No  Injury:  No  Pulses:       Dorsalis pedis pulses are present  on the right side, and present on the left side.        Posterior tibial pulses are present  on the right side, and present on the left side    Mental Status: Oriented: time: yes , space yes, person yes. Mini Cog: recall of 3 unrelated words: yes,   Recent Memory: normal  Remote Memory: normal    GET UP AND GO TEST     Well coordinated movements:No  Adjusted movements: Yes  Supervision needed: no  Physical support needed: yes  Uses a cane. Lab Results   Component Value Date     11/18/2020    K 3.4 (L) 11/18/2020     11/18/2020    CO2 21 11/18/2020    BUN 17 11/18/2020    CREATININE <0.5 (L) 11/18/2020    GLUCOSE 110 (H) 11/18/2020    CALCIUM 9.3 11/18/2020    PROT 7.7 11/18/2020    LABALBU 4.3 11/18/2020    BILITOT 0.9 11/18/2020    ALKPHOS 68 11/18/2020    AST 22 11/18/2020    ALT 16 11/18/2020    LABGLOM >60 11/18/2020    GFRAA >60 11/18/2020    AGRATIO 1.3 11/18/2020    GLOB 3.4 11/18/2020         Lab Results   Component Value Date    WBC 6.5 11/18/2020    HGB 14.5 11/18/2020    HCT 43.1 11/18/2020    MCV 97.4 11/18/2020     11/18/2020       Lab Results   Component Value Date    CHOL 162 01/31/2020    CHOL 187 10/18/2017     Lab Results   Component Value Date    TRIG 204 (H) 01/31/2020    TRIG 177 (H) 10/18/2017     Lab Results   Component Value Date    HDL 40 01/31/2020    HDL 41 10/18/2017     Lab Results   Component Value Date    LDLCALC 81 01/31/2020    LDLCALC 111 (H) 10/18/2017     Lab Results   Component Value Date    LABVLDL 41 01/31/2020    LABVLDL 35 10/18/2017     No results found for: [facility]    Lab Results   Component Value Date    LABMICR YES 11/18/2020       Lab Results   Component Value Date    LABA1C 6.0 01/31/2020    LABA1C 5.9 10/18/2017     Lab Results   Component Value Date    .5 01/31/2020       Assessment/Plan:    1. Hypothyroidism, unspecified type  Has declined medication in the past. importance of treatment discussed. Agreed on starting levothyroxine 50 mcg daily. Patient advised to take levothyroxine on and empty stomach, wait at least 30 minutes before taking any other medications, drinking or eating. Repeat thyroid function in 2 months.     - CBC Auto Differential; Future  - COMPREHENSIVE METABOLIC PANEL; Future  - TSH without Reflex; Future  - T4, Free; Future    2. History of UTI  Normal UA today,   - POCT Urinalysis no Micro  - CBC Auto Differential; Future  - COMPREHENSIVE METABOLIC PANEL; Future    3. Elevated hemoglobin A1c  - Hemoglobin A1C; Future    4. Essential hypertension  History of, BP is normal today, no meds. Normal lipids. Check kidney function today. PPD administered right forearm 4 pm today. Will be read at assisted living. Flu vaccine given. Discussed advanced directives with patient. Wants to be full code for now. Had COVID19 test negative 2 weeks ago. Will get rapid COVID19 test at [facility] ([facility]) at admission. COVID19 vaccine recommended. Plan discussed with [facility], they are expecting patient this afternoon. Patient's friend will be driving her there now. Paper work completed     Orders Placed This Encounter   Procedures    INFLUENZA, QUADV, ADJUVANTED, 65 YRS =, IM, PF, PREFILL SYR, 0.5ML (FLUAD)    Mantoux testing    CBC Auto Differential    COMPREHENSIVE METABOLIC PANEL    TSH without Reflex    T4, Free    Hemoglobin A1C    TSH without Reflex    T4, Free    POCT Urinalysis no Micro     Return in about 6 months (around 8/26/2021) for hypothyrodism . Discussed use, benefit, and side effects of prescribed medications. Barriers to medication compliance addressed. All patient questions answered. Pt voiced understanding. no change in level of consciousness/no confusion/no dizziness/no fever/no loss of consciousness/no nausea/no numbness/no vomiting/no weakness